# Patient Record
Sex: MALE | Race: WHITE | NOT HISPANIC OR LATINO | Employment: UNEMPLOYED | URBAN - METROPOLITAN AREA
[De-identification: names, ages, dates, MRNs, and addresses within clinical notes are randomized per-mention and may not be internally consistent; named-entity substitution may affect disease eponyms.]

---

## 2017-11-09 ENCOUNTER — HOSPITAL ENCOUNTER (EMERGENCY)
Facility: CLINIC | Age: 32
Discharge: HOME | End: 2017-11-10
Attending: EMERGENCY MEDICINE | Admitting: EMERGENCY MEDICINE
Payer: COMMERCIAL

## 2017-11-09 DIAGNOSIS — J11.1 INFLUENZA-LIKE ILLNESS: ICD-10-CM

## 2017-11-09 LAB
ALBUMIN SERPL-MCNC: 3.5 G/DL (ref 3.4–5)
ALP SERPL-CCNC: 70 U/L (ref 40–150)
ALT SERPL W P-5'-P-CCNC: 33 U/L (ref 0–70)
ANION GAP SERPL CALCULATED.3IONS-SCNC: 7 MMOL/L (ref 3–14)
AST SERPL W P-5'-P-CCNC: 17 U/L (ref 0–45)
BASOPHILS # BLD AUTO: 0.1 10E9/L (ref 0–0.2)
BASOPHILS NFR BLD AUTO: 0.4 %
BILIRUB SERPL-MCNC: 0.3 MG/DL (ref 0.2–1.3)
BUN SERPL-MCNC: 12 MG/DL (ref 7–30)
CALCIUM SERPL-MCNC: 8.3 MG/DL (ref 8.5–10.1)
CHLORIDE SERPL-SCNC: 105 MMOL/L (ref 94–109)
CK SERPL-CCNC: 81 U/L (ref 30–300)
CO2 SERPL-SCNC: 24 MMOL/L (ref 20–32)
CREAT SERPL-MCNC: 0.8 MG/DL (ref 0.66–1.25)
DEPRECATED S PYO AG THROAT QL EIA: NORMAL
DIFFERENTIAL METHOD BLD: ABNORMAL
EOSINOPHIL # BLD AUTO: 0.1 10E9/L (ref 0–0.7)
EOSINOPHIL NFR BLD AUTO: 0.4 %
ERYTHROCYTE [DISTWIDTH] IN BLOOD BY AUTOMATED COUNT: 12.9 % (ref 10–15)
FLUAV+FLUBV AG SPEC QL: NEGATIVE
FLUAV+FLUBV AG SPEC QL: NEGATIVE
GFR SERPL CREATININE-BSD FRML MDRD: >90 ML/MIN/1.7M2
GLUCOSE SERPL-MCNC: 103 MG/DL (ref 70–99)
HCT VFR BLD AUTO: 40.8 % (ref 40–53)
HGB BLD-MCNC: 13.9 G/DL (ref 13.3–17.7)
IMM GRANULOCYTES # BLD: 0.1 10E9/L (ref 0–0.4)
IMM GRANULOCYTES NFR BLD: 0.4 %
LACTATE BLD-SCNC: 1.1 MMOL/L (ref 0.7–2)
LYMPHOCYTES # BLD AUTO: 1.5 10E9/L (ref 0.8–5.3)
LYMPHOCYTES NFR BLD AUTO: 9 %
MCH RBC QN AUTO: 31.2 PG (ref 26.5–33)
MCHC RBC AUTO-ENTMCNC: 34.1 G/DL (ref 31.5–36.5)
MCV RBC AUTO: 92 FL (ref 78–100)
MONOCYTES # BLD AUTO: 1.3 10E9/L (ref 0–1.3)
MONOCYTES NFR BLD AUTO: 7.8 %
NEUTROPHILS # BLD AUTO: 13.9 10E9/L (ref 1.6–8.3)
NEUTROPHILS NFR BLD AUTO: 82 %
NRBC # BLD AUTO: 0 10*3/UL
NRBC BLD AUTO-RTO: 0 /100
PLATELET # BLD AUTO: 241 10E9/L (ref 150–450)
POTASSIUM SERPL-SCNC: 3.6 MMOL/L (ref 3.4–5.3)
PROT SERPL-MCNC: 7.4 G/DL (ref 6.8–8.8)
RBC # BLD AUTO: 4.46 10E12/L (ref 4.4–5.9)
SODIUM SERPL-SCNC: 136 MMOL/L (ref 133–144)
SPECIMEN SOURCE: NORMAL
SPECIMEN SOURCE: NORMAL
WBC # BLD AUTO: 16.9 10E9/L (ref 4–11)

## 2017-11-09 PROCEDURE — 87081 CULTURE SCREEN ONLY: CPT | Performed by: EMERGENCY MEDICINE

## 2017-11-09 PROCEDURE — 80053 COMPREHEN METABOLIC PANEL: CPT | Performed by: EMERGENCY MEDICINE

## 2017-11-09 PROCEDURE — 96374 THER/PROPH/DIAG INJ IV PUSH: CPT

## 2017-11-09 PROCEDURE — 25000128 H RX IP 250 OP 636: Performed by: EMERGENCY MEDICINE

## 2017-11-09 PROCEDURE — 83605 ASSAY OF LACTIC ACID: CPT | Performed by: EMERGENCY MEDICINE

## 2017-11-09 PROCEDURE — 85025 COMPLETE CBC W/AUTO DIFF WBC: CPT | Performed by: EMERGENCY MEDICINE

## 2017-11-09 PROCEDURE — 87804 INFLUENZA ASSAY W/OPTIC: CPT | Mod: 91 | Performed by: EMERGENCY MEDICINE

## 2017-11-09 PROCEDURE — 93005 ELECTROCARDIOGRAM TRACING: CPT

## 2017-11-09 PROCEDURE — 96361 HYDRATE IV INFUSION ADD-ON: CPT

## 2017-11-09 PROCEDURE — 99285 EMERGENCY DEPT VISIT HI MDM: CPT | Mod: 25

## 2017-11-09 PROCEDURE — 82550 ASSAY OF CK (CPK): CPT | Performed by: EMERGENCY MEDICINE

## 2017-11-09 PROCEDURE — 87880 STREP A ASSAY W/OPTIC: CPT | Performed by: EMERGENCY MEDICINE

## 2017-11-09 RX ORDER — LIDOCAINE 40 MG/G
CREAM TOPICAL
Status: DISCONTINUED | OUTPATIENT
Start: 2017-11-09 | End: 2017-11-10 | Stop reason: HOSPADM

## 2017-11-09 RX ORDER — SODIUM CHLORIDE 9 MG/ML
1000 INJECTION, SOLUTION INTRAVENOUS CONTINUOUS
Status: DISCONTINUED | OUTPATIENT
Start: 2017-11-09 | End: 2017-11-10 | Stop reason: HOSPADM

## 2017-11-09 RX ORDER — KETOROLAC TROMETHAMINE 30 MG/ML
30 INJECTION, SOLUTION INTRAMUSCULAR; INTRAVENOUS ONCE
Status: COMPLETED | OUTPATIENT
Start: 2017-11-09 | End: 2017-11-09

## 2017-11-09 RX ORDER — MORPHINE SULFATE 4 MG/ML
8 INJECTION, SOLUTION INTRAMUSCULAR; INTRAVENOUS ONCE
Status: COMPLETED | OUTPATIENT
Start: 2017-11-09 | End: 2017-11-10

## 2017-11-09 RX ADMIN — KETOROLAC TROMETHAMINE 30 MG: 30 INJECTION, SOLUTION INTRAMUSCULAR at 23:23

## 2017-11-09 RX ADMIN — SODIUM CHLORIDE 1000 ML: 9 INJECTION, SOLUTION INTRAVENOUS at 23:24

## 2017-11-09 ASSESSMENT — ENCOUNTER SYMPTOMS
CONFUSION: 0
VOMITING: 1
FEVER: 1
NECK PAIN: 1
CHILLS: 1
COUGH: 1
HALLUCINATIONS: 0
NECK STIFFNESS: 0
FATIGUE: 1
NAUSEA: 1
MYALGIAS: 1

## 2017-11-09 NOTE — ED AVS SNAPSHOT
[facility]    [address] [facility] [address]    Phone:  [phone]    Fax:  [phone]                                       Ted Borrero   MRN: [id number]    Department:  [facility]   Date of Visit:  11/9/2017           Patient Information     Date Of Birth          1985        Your diagnoses for this visit were:     Influenza-like illness        You were seen by Georgia Mckeon MD and Akil Swanson MD.      Follow-up Information     Follow up with Michoacano Gaytan MD.    Specialty:  Family Practice    Why:  3-5 days as needed    Contact information:    [facility]  [address] [facility] [address] [phone]          Follow up with [facility].    Specialty:  EMERGENCY MEDICINE    Why:  As needed, If symptoms worsen    Contact information:    [address] [facility] [address] [phone]      Discharge References/Attachments     VIRAL SYNDROME (ADULT) (ENGLISH)      24 Hour Appointment Hotline       To make an appointment at any [facility] clinic, call [phone] ([phone]). If you don't have a family doctor or clinic, we will help you find one. [facility] clinics are conveniently located to serve the needs of you and your family.             Review of your medicines      START taking        Dose / Directions Last dose taken    HYDROcodone-acetaminophen 5-325 MG per tablet   Commonly known as:  NORCO   Dose:  1-2 tablet   Quantity:  15 tablet        Take 1-2 tablets by mouth every 4 hours as needed for moderate to severe pain   Refills:  0          Our records show that you are taking the medicines listed below. If these are incorrect, please call your family doctor or clinic.        Dose / Directions Last dose taken    ADDERALL PO        Refills:  0                Prescriptions were sent or printed at these  locations (1 Prescription)                   Other Prescriptions                Printed at Department/Unit printer (1 of 1)         HYDROcodone-acetaminophen (NORCO) 5-325 MG per tablet                Procedures and tests performed during your visit     Beta strep group A culture    CBC with platelets differential    CK total    CSF Culture Aerobic Bacterial    Cell count with differential CSF: Tube 1    Cell count with differential CSF: Tube 4    Comprehensive metabolic panel    EKG 12-lead, tracing only    Glucose CSF: Tube 2    Gram stain    Influenza A/B antigen    Lactic acid whole blood    Protein total CSF: Tube 2    Rapid strep screen    UA with Microscopic      Orders Needing Specimen Collection     None      Pending Results     Date and Time Order Name Status Description    11/9/2017 2356 Gram stain In process     11/9/2017 2356 CSF Culture Aerobic Bacterial In process     11/9/2017 2251 EKG 12-lead, tracing only Preliminary     11/9/2017 2248 Beta strep group A culture In process             Pending Culture Results     Date and Time Order Name Status Description    11/9/2017 2356 Gram stain In process     11/9/2017 2356 CSF Culture Aerobic Bacterial In process     11/9/2017 2248 Beta strep group A culture In process             Pending Results Instructions     If you had any lab results that were not finalized at the time of your Discharge, you can call the ED Lab Result RN at [phone]. You will be contacted by this team for any positive Lab results or changes in treatment. The nurses are available 7 days a week from 10A to 6:30P.  You can leave a message 24 hours per day and they will return your call.        Test Results From Your Hospital Stay        11/9/2017 11:24 PM      Component Results     Component    Specimen Description    Throat    Rapid Strep A Screen    NEGATIVE: No Group A streptococcal antigen detected by immunoassay, await culture report.         11/9/2017 11:25 PM      Component  Results     Component Value Ref Range & Units Status    WBC 16.9 (H) 4.0 - 11.0 10e9/L Final    RBC Count 4.46 4.4 - 5.9 10e12/L Final    Hemoglobin 13.9 13.3 - 17.7 g/dL Final    Hematocrit 40.8 40.0 - 53.0 % Final    MCV 92 78 - 100 fl Final    MCH 31.2 26.5 - 33.0 pg Final    MCHC 34.1 31.5 - 36.5 g/dL Final    RDW 12.9 10.0 - 15.0 % Final    Platelet Count 241 150 - 450 10e9/L Final    Diff Method Automated Method  Final    % Neutrophils 82.0 % Final    % Lymphocytes 9.0 % Final    % Monocytes 7.8 % Final    % Eosinophils 0.4 % Final    % Basophils 0.4 % Final    % Immature Granulocytes 0.4 % Final    Nucleated RBCs 0 0 /100 Final    Absolute Neutrophil 13.9 (H) 1.6 - 8.3 10e9/L Final    Absolute Lymphocytes 1.5 0.8 - 5.3 10e9/L Final    Absolute Monocytes 1.3 0.0 - 1.3 10e9/L Final    Absolute Eosinophils 0.1 0.0 - 0.7 10e9/L Final    Absolute Basophils 0.1 0.0 - 0.2 10e9/L Final    Abs Immature Granulocytes 0.1 0 - 0.4 10e9/L Final    Absolute Nucleated RBC 0.0  Final         11/9/2017 11:44 PM      Component Results     Component Value Ref Range & Units Status    Sodium 136 133 - 144 mmol/L Final    Potassium 3.6 3.4 - 5.3 mmol/L Final    Chloride 105 94 - 109 mmol/L Final    Carbon Dioxide 24 20 - 32 mmol/L Final    Anion Gap 7 3 - 14 mmol/L Final    Glucose 103 (H) 70 - 99 mg/dL Final    Urea Nitrogen 12 7 - 30 mg/dL Final    Creatinine 0.80 0.66 - 1.25 mg/dL Final    GFR Estimate >90 >60 mL/min/1.7m2 Final    Non  GFR Calc    GFR Estimate If Black >90 >60 mL/min/1.7m2 Final    African American GFR Calc    Calcium 8.3 (L) 8.5 - 10.1 mg/dL Final    Bilirubin Total 0.3 0.2 - 1.3 mg/dL Final    Albumin 3.5 3.4 - 5.0 g/dL Final    Protein Total 7.4 6.8 - 8.8 g/dL Final    Alkaline Phosphatase 70 40 - 150 U/L Final    ALT 33 0 - 70 U/L Final    AST 17 0 - 45 U/L Final         11/9/2017 11:32 PM      Component Results     Component Value Ref Range & Units Status    Lactic Acid 1.1 0.7 - 2.0 mmol/L  Final         11/10/2017 12:34 AM      Component Results     Component Value Ref Range & Units Status    Color Urine Yellow  Final    Appearance Urine Clear  Final    Glucose Urine Negative NEG^Negative mg/dL Final    Bilirubin Urine Negative NEG^Negative Final    Ketones Urine Negative NEG^Negative mg/dL Final    Specific Gravity Urine 1.031 1.003 - 1.035 Final    Blood Urine Negative NEG^Negative Final    pH Urine 6.0 5.0 - 7.0 pH Final    Protein Albumin Urine Negative NEG^Negative mg/dL Final    Urobilinogen mg/dL 2.0 0.0 - 2.0 mg/dL Final    Nitrite Urine Negative NEG^Negative Final    Leukocyte Esterase Urine Negative NEG^Negative Final    Source Midstream Urine  Final    WBC Urine 3 (H) 0 - 2 /HPF Final    RBC Urine 1 0 - 2 /HPF Final    Bacteria Urine Few (A) NEG^Negative /HPF Final    Yeast Urine Few (A) NEG^Negative /HPF Final    Squamous Epithelial /HPF Urine 1 0 - 1 /HPF Final    Mucous Urine Present (A) NEG^Negative /LPF Final         11/9/2017 11:44 PM      Component Results     Component Value Ref Range & Units Status    CK Total 81 30 - 300 U/L Final         11/9/2017 11:43 PM      Component Results     Component Value Ref Range & Units Status    Influenza A/B Agn Specimen Nasopharyngeal  Final    Influenza A Negative NEG^Negative Final    Influenza B Negative NEG^Negative Final    Test results must be correlated with clinical data. If necessary, results   should be confirmed by a molecular assay or viral culture.           11/10/2017  1:14 AM         11/10/2017  1:12 AM      Component Results     Component Value Ref Range & Units Status    WBC CSF  0 - 5 /uL Final    Test not performed. Criteria not met for second cell count.    RBC CSF  0 - 2 /uL Final    Test not performed. Criteria not met for second cell count.         11/10/2017  1:04 AM      Component Results     Component Value Ref Range & Units Status    Glucose CSF 73 (H) 40 - 70 mg/dL Final    CSF glucose concentrations are about 60  percent of normal plasma glucose.         11/10/2017  1:04 AM      Component Results     Component Value Ref Range & Units Status    Protein Total CSF 33 15 - 60 mg/dL Final         11/10/2017 12:42 AM         11/10/2017 12:42 AM         11/10/2017  1:10 AM      Component Results     Component Value Ref Range & Units Status    WBC CSF 2 0 - 5 /uL Final    RBC CSF 0 0 - 2 /uL Final    Tube Number 4 # Final    Color CSF Colorless CLRL^Colorless Final    Appearance CSF Clear CLER^Clear Final                Clinical Quality Measure: Blood Pressure Screening     Your blood pressure was checked while you were in the emergency department today. The last reading we obtained was  BP: 119/80 . Please read the guidelines below about what these numbers mean and what you should do about them.  If your systolic blood pressure (the top number) is less than 120 and your diastolic blood pressure (the bottom number) is less than 80, then your blood pressure is normal. There is nothing more that you need to do about it.  If your systolic blood pressure (the top number) is 120-139 or your diastolic blood pressure (the bottom number) is 80-89, your blood pressure may be higher than it should be. You should have your blood pressure rechecked within a year by a primary care provider.  If your systolic blood pressure (the top number) is 140 or greater or your diastolic blood pressure (the bottom number) is 90 or greater, you may have high blood pressure. High blood pressure is treatable, but if left untreated over time it can put you at risk for heart attack, stroke, or kidney failure. You should have your blood pressure rechecked by a primary care provider within the next 4 weeks.  If your provider in the emergency department today gave you specific instructions to follow-up with your doctor or provider even sooner than that, you should follow that instruction and not wait for up to 4 weeks for your follow-up visit.        Thank you for  "choosing [facility]       Thank you for choosing [facility] for your care. Our goal is always to provide you with excellent care. Hearing back from our patients is one way we can continue to improve our services. Please take a few minutes to complete the written survey that you may receive in the mail after you visit with us. Thank you!        [email or web address] Information     [employer] lets you send messages to your doctor, view your test results, renew your prescriptions, schedule appointments and more. To sign up, go to [email or web address] . Click on \"Log in\" on the left side of the screen, which will take you to the Welcome page. Then click on \"Sign up Now\" on the right side of the page.     You will be asked to enter the access code listed below, as well as some personal information. Please follow the directions to create your username and password.     Your access code is: [email or web address]  Expires: 2018  1:05 AM     Your access code will  in 90 days. If you need help or a new code, please call your [facility] clinic or [phone].        Care EveryWhere ID     This is your Care EveryWhere ID. This could be used by other organizations to access your [facility] medical records  [id number]        Equal Access to Services     CHRIS FINNEY : Hadgunjan babino Soshamika, waaxda luqadaha, qaybta kaalmada adepreciousyada, massiel solano. So [facility] [phone].    ATENCIÓN: Si habla español, tiene a bertrand disposición servicios gratuitos de asistencia lingüística. Llame al [phone].    We comply with applicable federal civil rights laws and [place] laws. We do not discriminate on the basis of race, color, national origin, age, disability, sex, sexual orientation, or gender identity.            After Visit Summary       This is your record. Keep this with you and show to your community pharmacist(s) and doctor(s) at your next visit.                  "

## 2017-11-10 VITALS
RESPIRATION RATE: 20 BRPM | SYSTOLIC BLOOD PRESSURE: 119 MMHG | WEIGHT: 220 LBS | HEIGHT: 69 IN | BODY MASS INDEX: 32.58 KG/M2 | DIASTOLIC BLOOD PRESSURE: 80 MMHG | TEMPERATURE: 99.5 F | OXYGEN SATURATION: 99 %

## 2017-11-10 LAB
ALBUMIN UR-MCNC: NEGATIVE MG/DL
APPEARANCE CSF: CLEAR
APPEARANCE UR: CLEAR
BACTERIA #/AREA URNS HPF: ABNORMAL /HPF
BILIRUB UR QL STRIP: NEGATIVE
COLOR CSF: COLORLESS
COLOR UR AUTO: YELLOW
GLUCOSE CSF-MCNC: 73 MG/DL (ref 40–70)
GLUCOSE UR STRIP-MCNC: NEGATIVE MG/DL
GRAM STN SPEC: NORMAL
HGB UR QL STRIP: NEGATIVE
INTERPRETATION ECG - MUSE: NORMAL
KETONES UR STRIP-MCNC: NEGATIVE MG/DL
LEUKOCYTE ESTERASE UR QL STRIP: NEGATIVE
MUCOUS THREADS #/AREA URNS LPF: PRESENT /LPF
NITRATE UR QL: NEGATIVE
PH UR STRIP: 6 PH (ref 5–7)
PROT CSF-MCNC: 33 MG/DL (ref 15–60)
RBC # CSF MANUAL: 0 /UL (ref 0–2)
RBC # CSF MANUAL: NORMAL /UL (ref 0–2)
RBC #/AREA URNS AUTO: 1 /HPF (ref 0–2)
SOURCE: ABNORMAL
SP GR UR STRIP: 1.03 (ref 1–1.03)
SPECIMEN SOURCE: NORMAL
SQUAMOUS #/AREA URNS AUTO: 1 /HPF (ref 0–1)
TUBE # CSF: 4 #
UROBILINOGEN UR STRIP-MCNC: 2 MG/DL (ref 0–2)
WBC # CSF MANUAL: 2 /UL (ref 0–5)
WBC # CSF MANUAL: NORMAL /UL (ref 0–5)
WBC #/AREA URNS AUTO: 3 /HPF (ref 0–2)
YEAST #/AREA URNS HPF: ABNORMAL /HPF

## 2017-11-10 PROCEDURE — 96375 TX/PRO/DX INJ NEW DRUG ADDON: CPT

## 2017-11-10 PROCEDURE — 96361 HYDRATE IV INFUSION ADD-ON: CPT

## 2017-11-10 PROCEDURE — 62270 DX LMBR SPI PNXR: CPT

## 2017-11-10 PROCEDURE — 89050 BODY FLUID CELL COUNT: CPT | Performed by: EMERGENCY MEDICINE

## 2017-11-10 PROCEDURE — 25000128 H RX IP 250 OP 636: Performed by: EMERGENCY MEDICINE

## 2017-11-10 PROCEDURE — 87205 SMEAR GRAM STAIN: CPT | Performed by: EMERGENCY MEDICINE

## 2017-11-10 PROCEDURE — 81001 URINALYSIS AUTO W/SCOPE: CPT | Performed by: EMERGENCY MEDICINE

## 2017-11-10 PROCEDURE — 87070 CULTURE OTHR SPECIMN AEROBIC: CPT | Performed by: EMERGENCY MEDICINE

## 2017-11-10 PROCEDURE — 82945 GLUCOSE OTHER FLUID: CPT | Performed by: EMERGENCY MEDICINE

## 2017-11-10 PROCEDURE — 84157 ASSAY OF PROTEIN OTHER: CPT | Performed by: EMERGENCY MEDICINE

## 2017-11-10 PROCEDURE — 25000132 ZZH RX MED GY IP 250 OP 250 PS 637: Performed by: EMERGENCY MEDICINE

## 2017-11-10 RX ORDER — ACETAMINOPHEN 325 MG/1
325 TABLET ORAL ONCE
Status: COMPLETED | OUTPATIENT
Start: 2017-11-10 | End: 2017-11-10

## 2017-11-10 RX ORDER — HYDROCODONE BITARTRATE AND ACETAMINOPHEN 5; 325 MG/1; MG/1
1-2 TABLET ORAL EVERY 4 HOURS PRN
Qty: 15 TABLET | Refills: 0 | Status: ON HOLD | OUTPATIENT
Start: 2017-11-10 | End: 2019-07-28

## 2017-11-10 RX ORDER — HYDROCODONE BITARTRATE AND ACETAMINOPHEN 5; 325 MG/1; MG/1
2 TABLET ORAL
Status: DISCONTINUED | OUTPATIENT
Start: 2017-11-10 | End: 2017-11-10 | Stop reason: HOSPADM

## 2017-11-10 RX ORDER — MORPHINE SULFATE 4 MG/ML
4 INJECTION, SOLUTION INTRAMUSCULAR; INTRAVENOUS
Status: DISCONTINUED | OUTPATIENT
Start: 2017-11-10 | End: 2017-11-10 | Stop reason: HOSPADM

## 2017-11-10 RX ADMIN — MORPHINE SULFATE 8 MG: 4 INJECTION INTRAVENOUS at 00:06

## 2017-11-10 RX ADMIN — ACETAMINOPHEN 325 MG: 325 TABLET, FILM COATED ORAL at 00:32

## 2017-11-10 RX ADMIN — SODIUM CHLORIDE 1000 ML: 9 INJECTION, SOLUTION INTRAVENOUS at 00:41

## 2017-11-10 NOTE — ED NOTES
Received sign out from Dr. Mckeon. CSF WBC is negative Follow up with Akil Baltazar MD  11/10/17 1231

## 2017-11-10 NOTE — ED NOTES
Pt started feeling unwell this am with body aches, fever, vomiting, head and neck pain.  Pt did take tylenol cold and flu earlier.

## 2017-11-10 NOTE — ED PROVIDER NOTES
History     Chief Complaint:  Fever    HPI   Ted Borrero is a 32 year old male who presents to the emergency department today for evaluation of a fever. The patient reports waking up today feeling very unwell with severe body aches and a fever. The patient notes he has been febrile up to 104.8. The patient states his ribcage is very tender, especially with deep inspiration. The patient notes pain to the back of his neck, but denies any changes in his range of motion of his neck. Additionally, the patient reports intermittent nausea and one episode of profuse vomiting. He also endorses a cough. The patient states he took some tylenol within the last two hours prior to arrival. He notes with his symptoms today he has not had much food or liquid intake. The patient denies any rash or recent travel. He denies any joint swelling, and his wife denies any confusion or hallucinations. He does note his infant daughter was ill last week with a respiratory illness.    Allergies:  No Known Drug Allergies      Medications:    Amphetamine-Dextroamphetamine (ADDERALL PO)     Past Medical History:    ADD    Past Surgical History:    History reviewed. No pertinent surgical history.    Family History:    History reviewed. No pertinent family history.    Social History:  The patient was accompanied to the ED by his wife.  Smoking Status: Current every day smoker  Smokeless Tobacco: Never used   Alcohol Use: Yes    Marital Status:        Review of Systems   Constitutional: Positive for chills, fatigue and fever.   Respiratory: Positive for cough.    Gastrointestinal: Positive for nausea and vomiting.   Musculoskeletal: Positive for myalgias and neck pain. Negative for neck stiffness.   Psychiatric/Behavioral: Negative for confusion and hallucinations.   All other systems reviewed and are negative.    Physical Exam     Patient Vitals for the past 24 hrs:   BP Temp Temp src Resp SpO2 Height Weight   11/09/17 2338 (!) 180/94  "- - - 95 % - -   11/09/17 2330 - - - - 97 % - -   11/09/17 2327 - 101.6  F (38.7  C) Temporal 20 99 % - -   11/09/17 2315 - - - - 99 % - -   11/09/17 2300 - - - - 100 % - -   11/09/17 2245 - - - - 97 % - -   11/09/17 2237 (!) 162/103 99.7  F (37.6  C) Oral 20 98 % 1.753 m (5' 9\") 99.8 kg (220 lb)   11/09/17 2234 - - - - 98 % - -   11/09/17 2233 (!) 162/103 - - - - - -      Physical Exam  General: Uncomfortable appearing adult male sitting upright.   Eyes: PERRL, Conjunctive within normal limits. No scleral icterus.   ENT: Moist mucous membranes, oropharynx clear. Mild erythema and edema of posterior oropharynx. No exudate or lesions.   Neck: No lymphadenopathy or neck rigidity.  CV: Normal S1S2, no murmur, rub or gallop. Tachycardic rate.   Resp: Clear to auscultation bilaterally, no wheezes, rales or rhonchi. Normal respiratory effort.  GI: Abdomen is soft, nontender and nondistended. No palpable masses. No rebound or guarding.  MSK: No edema. Normal active range of motion. Mild diffuse tenderness to palpation of the extremities.No palpable joint effusions or extremity edema.   Skin: Warm and dry. No rashes or lesions or ecchymoses on visible skin.  Neuro: Alert and oriented. Responds appropriately to all questions and commands. No focal findings appreciated. Normal muscle tone.  Psych: Appropriate mood and affect.   Emergency Department Course     ECG:  Indication: Fever  Completed at 2314.  Read at 2316.   Sinus tachycardia. Otherwise normal ECG.   Rate 110 bpm. NM interval 138. QRS duration 94. QT/QTc 308/416. P-R-T axes 49 -7 24.     Laboratory:  Laboratory findings were communicated with the patient who voiced understanding of the findings.    Rapid strep screen: Negative   Influenza A/B antigen: Negative     Beta strep group A culture: Pending    CBC: WBC 16.9 (H), HGB 13.9,   CMP: Glucose 103 (H), Calcium 8.3 (L),  o/w WNL. (Creatinine 0.80)   Lactic Acid: 1.1   CK total: 81    Lumbar Puncture:  Cell " count with differential CSF: Pending  Glucose CSF: Pending  Protein total CSF: Pending   CSF Culture Aerobic bacterial: Pending   Gram stain: Pending     Procedures:   Lumbar Puncture          INDICATION:  headache and fever      CONSENT:  Risks (including but not limited to; infection, bleeding, spinal headache with possibility of spinal patch and temporary or permanent neurologic injury), benefits and alternatives were discussed with patient and consent for procedure was obtained.    TIMEOUT:  Universal protocol was followed. TIME OUT conducted just prior to starting procedure confirmed patient identity, site/side, procedure, patient position, and availability of correct equipment and implants? Yes      MEDICATIONS:  Lidocaine: Local infiltration    PROCEDURAL NOTE:  Patient was placed in a left lateral decubitus position.  The low back was prepped with Betadine.  The patient was medicated as above.  A spinal needle was used to gain access to the subarachnoid space with stylet in place.  The fluid was clear.  Stylet was replaced and needle withdrawn.    PATIENT STATUS:  Patient tolerated the procedure well.  There were no complications.     Interventions:  2323 NS Bolus 1,000mL IV   2324 Toradol 30mg IV   0006 Morphine 8mg IV       Emergency Department Course:  Nursing notes and vitals reviewed.  2245 I performed an exam of the patient as documented above.   The patient's throat was swabbed and this sample was sent for rapid strep screen, findings above.   IV was inserted and blood was drawn for laboratory testing, results above.   EKG obtained in the ED, see results above.    2355 I rechecked the patient and discussed the possibility of a lumbar puncture.   0005 I performed the lumbar puncture as documented above.   I discussed the treatment plan with the patient. They expressed understanding of this plan and consented to discharge. They will be discharged home with instructions for care and follow up. In  addition, the patient will return to the emergency department if their symptoms persist, worsen, if new symptoms arise or if there is any concern.  All questions were answered. I personally reviewed the laboratory results with the Patient and spouse and answered all related questions prior to discharge.  Impression & Plan      Medical Decision Making:  Ted Borrero is a 32 year old male healthy at baseline who presents to the emergency department with concern for fever, headache, mild respiratory symptoms, and nausea. His symptoms seem most consistent with influenza like illness clinically, however I was unable to entirely exclude meningitis. He does have a leukocytosis. Influenza here was negative although my suspicion is still high for influenza. He had a negative rapid strep test. He had no evidence of encephalopathy. Lumbar puncture was performed and CSF results pending at this time. Patient is signed out to Akil Swanson MD, for follow up of CSF studies. Appropriate disposition pending results. I discussed this plan with the patient. He verbalized understanding and agreement. All questions answered prior to transfer of care.     Diagnosis:    ICD-10-CM    1. Influenza-like illness R69        Disposition:  The patient's care is transferred to Akil Swanson MD, of the emergency department service awaiting CSF test results.     Scribe Disclosure:  I, Erick Roger, am serving as a scribe at 10:41 PM on 11/9/2017 to document services personally performed by Georgia Mckeon MD based on my observations and the provider's statements to me.    11/9/2017   [facility] EMERGENCY DEPARTMENT       Georgia Mckeon MD  11/10/17 0040

## 2017-11-12 LAB
BACTERIA SPEC CULT: NORMAL
SPECIMEN SOURCE: NORMAL

## 2017-11-15 LAB
BACTERIA SPEC CULT: NO GROWTH
Lab: NORMAL
SPECIMEN SOURCE: NORMAL

## 2018-05-20 ENCOUNTER — OFFICE VISIT (OUTPATIENT)
Dept: URGENT CARE | Facility: URGENT CARE | Age: 33
End: 2018-05-20
Payer: MEDICAID

## 2018-05-20 ENCOUNTER — RADIANT APPOINTMENT (OUTPATIENT)
Dept: GENERAL RADIOLOGY | Facility: CLINIC | Age: 33
End: 2018-05-20
Attending: FAMILY MEDICINE
Payer: MEDICAID

## 2018-05-20 VITALS
SYSTOLIC BLOOD PRESSURE: 126 MMHG | BODY MASS INDEX: 34.64 KG/M2 | DIASTOLIC BLOOD PRESSURE: 69 MMHG | TEMPERATURE: 97.4 F | OXYGEN SATURATION: 100 % | HEART RATE: 86 BPM | WEIGHT: 234.6 LBS | RESPIRATION RATE: 16 BRPM

## 2018-05-20 DIAGNOSIS — M77.8 RIGHT WRIST TENDINITIS: Primary | ICD-10-CM

## 2018-05-20 DIAGNOSIS — M77.8 RIGHT WRIST TENDINITIS: ICD-10-CM

## 2018-05-20 PROCEDURE — 73110 X-RAY EXAM OF WRIST: CPT | Mod: RT

## 2018-05-20 PROCEDURE — 99203 OFFICE O/P NEW LOW 30 MIN: CPT | Performed by: FAMILY MEDICINE

## 2018-05-20 RX ORDER — METHYLPREDNISOLONE 4 MG
4 TABLET, DOSE PACK ORAL SEE ADMIN INSTRUCTIONS
Qty: 21 TABLET | Refills: 0 | Status: ON HOLD | OUTPATIENT
Start: 2018-05-20 | End: 2019-07-28

## 2018-05-20 NOTE — LETTER
[facility]  [address]  [facility] [address]  [phone]      May 20, 2018    RE:  Ted Borrero                                                                                                                                                       [address] [facility] [address]            To whom it may concern:    Ted Borrero is under my professional care for Right wrist tendinitis.   He  may return to work with the following: wear right wrist splint at work,  Limited use or right hand for 3 weeks .          Sincerely,        Jennifer Handley MD    [facility]

## 2018-05-20 NOTE — PROGRESS NOTES
"SUBJECTIVE  Chief Complaint   Patient presents with     Musculoskeletal Problem     Righthand injury x 4-5 days ago. Pt has swelling, redness, and limited mobility.      CC:Ted Borrero is a 33 year old male here for evaluation of right wrist pain with grasping and moving the wrist    HPI: He has had wrist pain for 5 days.  The  pain started due to what activity- - pushing / twisting a tool-  He heard a \"pop\" and developed dorsal wrist pain.  The location of the pain is - dorsal-(to the back).  The pain is - moderate.  Pain does   limit usual activities at work and home  Associated symptoms - tenderness, swelling and achiness.  The pain is worsened by - extension/ ,movement, .  The pain is  worse with forceful movement,  Especially when doing forceful twisting  He has treated the pain with rest, ibuprophen and tylenol.    PAST HISTORY  Previous Wrist imaging - NO.  Previous wrist injury/trauma - NO.  Previous wrist surgery - NO.    Past Medical History:   Diagnosis Date     ADD (attention deficit disorder)      There is no problem list on file for this patient.      ALLERGIES:  Review of patient's allergies indicates no known allergies.      Current Outpatient Prescriptions on File Prior to Visit:  Amphetamine-Dextroamphetamine (ADDERALL PO)    HYDROcodone-acetaminophen (NORCO) 5-325 MG per tablet Take 1-2 tablets by mouth every 4 hours as needed for moderate to severe pain (Patient not taking: Reported on 5/20/2018)     No current facility-administered medications on file prior to visit.     Social History   Substance Use Topics     Smoking status: Current Every Day Smoker     Smokeless tobacco: Never Used     Alcohol use Yes       No family history on file.    ROS:  CONSTITUTIONAL:NEGATIVE for fever, chills,    INTEGUMENTARY/SKIN: NEGATIVE for worrisome rashes,   EYES: NEGATIVE for vision changes or irritation  ENT/MOUTH: NEGATIVE for ear, mouth and throat problems  RESP:NEGATIVE for significant cough or " SOB    OBJECTIVE:  /69  Pulse 86  Temp 97.4  F (36.3  C) (Tympanic)  Resp 16  Wt 234 lb 9.6 oz (106.4 kg)  SpO2 100%  BMI 34.64 kg/m2  General: alert, mild distress and cooperative.  Neuro: oriented to person, place and time.   Walks, sits, gestures without difficulty  SKIN: Skin color, texture, turgor normal. No rashes or lesions.     Musculoskeletal  Pain free FROM of the right shoulder, elbow, fingers with sensation intact              .  Wrist Exam right.  Inspection/palpation: mild dorsal swelling     Range of Motion  Normal motion  Mildly limited by pain with PROM  Wrist ligaments stable  little pain with flexion of the wrist,  not worse with resisted flexion    pain with extension of the wrist,    worse with resisted extension    EYES: EOMI,  PERRL, conjunctiva clear           X-ray - no fracture right wrist    ASSESSMENT/PLAN  Right wrist tendinitis     Dorsal  Extensor tendons    - XR Wrist Right G/E 3 Views; Future  - order for DME; Right hand thumb spica splint  - methylPREDNISolone (MEDROL) 4 MG tablet; Take 1 tablet (4 mg) by mouth See Admin Instructions follow package directions      Pain control with OTC acetaminophen/ ibuprofen     Steroid medication to reduce inflammation of the injured tendons  Try to limit activities that cause pain  Warm packs to the painful region to encourage blood flow  Splint provided  We discussed that tendonitis can be slow to heal (weeks)  and that repeated forceful movements of the injured tendons can make symptoms worse and healing more prolonged     Note for work

## 2018-05-20 NOTE — PATIENT INSTRUCTIONS
Tendonitis  A tendon is the thick fibrous cord that joins muscle to bone and allows joints to move. When a tendon becomes inflamed, it is called tendonitis. This can occur from overuse, injury, or infection. This usually involves the shoulders, forearm, wrist, hands and foot. Symptoms include pain, swelling and tenderness to the touch. Moving the joint increases the pain.  It takes 4 to 6 weeks for tendonitis to heal. It is treated by preventing motion of the tendon with a splint or brace and the use of anti-inflammatory medicine.  Home care    Some people find relief with ice packs. These can be crushed or cubed ice in a plastic bag or a bag of frozen vegetables wrapped in a thin towel. Other people get better relief with heat. This can include a hot shower, hot bath, or a moist towel warmed in a microwave. Try each and use the method that feels best, for 15 to 20 minutes several times a day.    Rest the inflamed joint and protect it from movement.    You may use over-the-counter ibuprofen or naproxen to treat pain and inflammation, unless another medicine was prescribed. If you can't take these medicines, acetaminophen may help with the pain, but does not treat inflammation. If you have chronic liver or kidney disease or ever had a stomach ulcer or gastrointestinal bleeding, talk with your doctor before using these medicines.    As your symptoms improve, begin gradual motion at the involved joint.  Follow-up care  Follow up with your healthcare provider if you are not improving after 5 days of treatment.  When to seek medical advice  Call your healthcare provider right away if any of these occur:    Redness over the painful area    Increasing pain or swelling at the joint    Fever (1 degree above your normal temperature) lasting 24 to 48 hours Or, whatever your healthcare provider told you to report based on your condition  Date Last Reviewed: 11/21/2015 2000-2017 [employer]. [address], [place] [address]. All rights reserved. This information is not intended as a substitute for professional medical care. Always follow your healthcare professional's instructions.

## 2018-05-20 NOTE — MR AVS SNAPSHOT
After Visit Summary   5/20/2018    Ted Borrero    MRN: [id number]           Patient Information     Date Of Birth          1985        Visit Information        Provider Department      5/20/2018 12:50 PM Jennifer Handley MD [facility]        Today's Diagnoses     Right wrist tendinitis    -  1      Care Instructions      Tendonitis  A tendon is the thick fibrous cord that joins muscle to bone and allows joints to move. When a tendon becomes inflamed, it is called tendonitis. This can occur from overuse, injury, or infection. This usually involves the shoulders, forearm, wrist, hands and foot. Symptoms include pain, swelling and tenderness to the touch. Moving the joint increases the pain.  It takes 4 to 6 weeks for tendonitis to heal. It is treated by preventing motion of the tendon with a splint or brace and the use of anti-inflammatory medicine.  Home care    Some people find relief with ice packs. These can be crushed or cubed ice in a plastic bag or a bag of frozen vegetables wrapped in a thin towel. Other people get better relief with heat. This can include a hot shower, hot bath, or a moist towel warmed in a microwave. Try each and use the method that feels best, for 15 to 20 minutes several times a day.    Rest the inflamed joint and protect it from movement.    You may use over-the-counter ibuprofen or naproxen to treat pain and inflammation, unless another medicine was prescribed. If you can't take these medicines, acetaminophen may help with the pain, but does not treat inflammation. If you have chronic liver or kidney disease or ever had a stomach ulcer or gastrointestinal bleeding, talk with your doctor before using these medicines.    As your symptoms improve, begin gradual motion at the involved joint.  Follow-up care  Follow up with your healthcare provider if you are not improving after 5 days of treatment.  When to seek medical advice  Call your  "healthcare provider right away if any of these occur:    Redness over the painful area    Increasing pain or swelling at the joint    Fever (1 degree above your normal temperature) lasting 24 to 48 hours Or, whatever your healthcare provider told you to report based on your condition  Date Last Reviewed: 11/21/2015 2000-2017 [employer]. [address] [place] [address]. All rights reserved. This information is not intended as a substitute for professional medical care. Always follow your healthcare professional's instructions.                Follow-ups after your visit        Who to contact     If you have questions or need follow up information about today's clinic visit or your schedule please contact [facility] directly at [phone].  Normal or non-critical lab and imaging results will be communicated to you by [employer], letter or phone within 4 business days after the clinic has received the results. If you do not hear from us within 7 days, please contact the clinic through [employer] or phone. If you have a critical or abnormal lab result, we will notify you by phone as soon as possible.  Submit refill requests through [employer] or call your pharmacy and they will forward the refill request to us. Please allow 3 business days for your refill to be completed.          Additional Information About Your Visit        [employer] Information     [employer] lets you send messages to your doctor, view your test results, renew your prescriptions, schedule appointments and more. To sign up, go to [email or web address] . Click on \"Log in\" on the left side of the screen, which will take you to the Welcome page. Then click on \"Sign up Now\" on the right side of the page.     You will be asked to enter the access code listed below, as well as some personal information. Please follow the directions to create your username and password.     Your access code is: [email or web address]  Expires: " 2018  2:17 PM     Your access code will  in 90 days. If you need help or a new code, please call your [facility] or [phone].        Care EveryWhere ID     This is your Care EveryWhere ID. This could be used by other organizations to access your [facility] medical records  [id number]        Your Vitals Were     Pulse Temperature Respirations Pulse Oximetry BMI (Body Mass Index)       86 97.4  F (36.3  C) (Tympanic) 16 100% 34.64 kg/m2        Blood Pressure from Last 3 Encounters:   18 126/69   11/10/17 119/80   12 154/69    Weight from Last 3 Encounters:   18 234 lb 9.6 oz (106.4 kg)   17 220 lb (99.8 kg)                 Today's Medication Changes          These changes are accurate as of 18  2:17 PM.  If you have any questions, ask your nurse or doctor.               Start taking these medicines.        Dose/Directions    methylPREDNISolone 4 MG tablet   Commonly known as:  MEDROL   Used for:  Right wrist tendinitis   Started by:  Jennifer Handley MD        Dose:  4 mg   Take 1 tablet (4 mg) by mouth See Admin Instructions follow package directions   Quantity:  21 tablet   Refills:  0       order for DME   Used for:  Right wrist tendinitis   Started by:  Jennifer Handley MD        Right hand thumb spica splint   Quantity:  1 Units   Refills:  0            Where to get your medicines      These medications were sent to [facility] [address] [place] [address] [id number] [place] [address] AT SEC of  [address] [place] [id number]     Phone:  [phone]     methylPREDNISolone 4 MG tablet         Some of these will need a paper prescription and others can be bought over the counter.  Ask your nurse if you have questions.     Bring a paper prescription for each of these medications     order for DME                Primary Care Provider Office Phone # Fax #    [place] aGytan [phone]       [facility]  [address] [facility] [address]        Equal Access to Services     LOUELIANA SHARMIN : Hadii aad ku hadtheresagwen Johanashamika, warobertoda lazaradivyaha, qagutierrezantoni bullockvickiirineo lentzstephanieirineo, massiel mendezrosaángel solano. So [facility] [phone].    ATENCIÓN: Si habla español, tiene a bertrand disposición servicios gratuitos de asistencia lingüística. [facility] [phone].    We comply with applicable federal civil rights laws and [place] laws. We do not discriminate on the basis of race, color, national origin, age, disability, sex, sexual orientation, or gender identity.            Thank you!     Thank you for choosing [facility]  for your care. Our goal is always to provide you with excellent care. Hearing back from our patients is one way we can continue to improve our services. Please take a few minutes to complete the written survey that you may receive in the mail after your visit with us. Thank you!             Your Updated Medication List - Protect others around you: Learn how to safely use, store and throw away your medicines at www.disposemymeds.org.          This list is accurate as of 5/20/18  2:17 PM.  Always use your most recent med list.                   Brand Name Dispense Instructions for use Diagnosis    ADDERALL PO           HYDROcodone-acetaminophen 5-325 MG per tablet    NORCO    15 tablet    Take 1-2 tablets by mouth every 4 hours as needed for moderate to severe pain        methylPREDNISolone 4 MG tablet    MEDROL    21 tablet    Take 1 tablet (4 mg) by mouth See Admin Instructions follow package directions    Right wrist tendinitis       order for DME     1 Units    Right hand thumb spica splint    Right wrist tendinitis

## 2018-09-23 ENCOUNTER — APPOINTMENT (OUTPATIENT)
Dept: GENERAL RADIOLOGY | Facility: CLINIC | Age: 33
End: 2018-09-23
Attending: EMERGENCY MEDICINE
Payer: MEDICAID

## 2018-09-23 ENCOUNTER — HOSPITAL ENCOUNTER (EMERGENCY)
Facility: CLINIC | Age: 33
Discharge: HOME | End: 2018-09-23
Attending: EMERGENCY MEDICINE | Admitting: EMERGENCY MEDICINE
Payer: MEDICAID

## 2018-09-23 VITALS
DIASTOLIC BLOOD PRESSURE: 77 MMHG | RESPIRATION RATE: 16 BRPM | TEMPERATURE: 98.3 F | OXYGEN SATURATION: 99 % | HEART RATE: 107 BPM | SYSTOLIC BLOOD PRESSURE: 127 MMHG

## 2018-09-23 DIAGNOSIS — L03.113 CELLULITIS OF RIGHT UPPER EXTREMITY: ICD-10-CM

## 2018-09-23 LAB
ANION GAP SERPL CALCULATED.3IONS-SCNC: 3 MMOL/L (ref 3–14)
BASOPHILS # BLD AUTO: 0.1 10E9/L (ref 0–0.2)
BASOPHILS NFR BLD AUTO: 0.8 %
BUN SERPL-MCNC: 15 MG/DL (ref 7–30)
CALCIUM SERPL-MCNC: 8.9 MG/DL (ref 8.5–10.1)
CHLORIDE SERPL-SCNC: 103 MMOL/L (ref 94–109)
CO2 SERPL-SCNC: 30 MMOL/L (ref 20–32)
CREAT SERPL-MCNC: 0.98 MG/DL (ref 0.66–1.25)
DIFFERENTIAL METHOD BLD: ABNORMAL
EOSINOPHIL # BLD AUTO: 0.2 10E9/L (ref 0–0.7)
EOSINOPHIL NFR BLD AUTO: 1.2 %
ERYTHROCYTE [DISTWIDTH] IN BLOOD BY AUTOMATED COUNT: 12.2 % (ref 10–15)
GFR SERPL CREATININE-BSD FRML MDRD: 89 ML/MIN/1.7M2
GLUCOSE SERPL-MCNC: 91 MG/DL (ref 70–99)
HCT VFR BLD AUTO: 41.7 % (ref 40–53)
HGB BLD-MCNC: 13.8 G/DL (ref 13.3–17.7)
IMM GRANULOCYTES # BLD: 0.1 10E9/L (ref 0–0.4)
IMM GRANULOCYTES NFR BLD: 0.4 %
LACTATE BLD-SCNC: 1.2 MMOL/L (ref 0.7–2)
LYMPHOCYTES # BLD AUTO: 3.2 10E9/L (ref 0.8–5.3)
LYMPHOCYTES NFR BLD AUTO: 23.2 %
MCH RBC QN AUTO: 31.6 PG (ref 26.5–33)
MCHC RBC AUTO-ENTMCNC: 33.1 G/DL (ref 31.5–36.5)
MCV RBC AUTO: 95 FL (ref 78–100)
MONOCYTES # BLD AUTO: 1.2 10E9/L (ref 0–1.3)
MONOCYTES NFR BLD AUTO: 8.4 %
NEUTROPHILS # BLD AUTO: 9.2 10E9/L (ref 1.6–8.3)
NEUTROPHILS NFR BLD AUTO: 66 %
NRBC # BLD AUTO: 0 10*3/UL
NRBC BLD AUTO-RTO: 0 /100
PLATELET # BLD AUTO: 313 10E9/L (ref 150–450)
POTASSIUM SERPL-SCNC: 4.3 MMOL/L (ref 3.4–5.3)
RBC # BLD AUTO: 4.37 10E12/L (ref 4.4–5.9)
SODIUM SERPL-SCNC: 136 MMOL/L (ref 133–144)
WBC # BLD AUTO: 13.9 10E9/L (ref 4–11)

## 2018-09-23 PROCEDURE — 25000128 H RX IP 250 OP 636: Performed by: EMERGENCY MEDICINE

## 2018-09-23 PROCEDURE — 73090 X-RAY EXAM OF FOREARM: CPT | Mod: RT

## 2018-09-23 PROCEDURE — 85025 COMPLETE CBC W/AUTO DIFF WBC: CPT | Performed by: EMERGENCY MEDICINE

## 2018-09-23 PROCEDURE — 87205 SMEAR GRAM STAIN: CPT | Performed by: EMERGENCY MEDICINE

## 2018-09-23 PROCEDURE — 96374 THER/PROPH/DIAG INJ IV PUSH: CPT

## 2018-09-23 PROCEDURE — 87040 BLOOD CULTURE FOR BACTERIA: CPT | Performed by: EMERGENCY MEDICINE

## 2018-09-23 PROCEDURE — 96361 HYDRATE IV INFUSION ADD-ON: CPT

## 2018-09-23 PROCEDURE — 87077 CULTURE AEROBIC IDENTIFY: CPT | Performed by: EMERGENCY MEDICINE

## 2018-09-23 PROCEDURE — 80048 BASIC METABOLIC PNL TOTAL CA: CPT | Performed by: EMERGENCY MEDICINE

## 2018-09-23 PROCEDURE — 36415 COLL VENOUS BLD VENIPUNCTURE: CPT | Performed by: EMERGENCY MEDICINE

## 2018-09-23 PROCEDURE — 87070 CULTURE OTHR SPECIMN AEROBIC: CPT | Performed by: EMERGENCY MEDICINE

## 2018-09-23 PROCEDURE — 99284 EMERGENCY DEPT VISIT MOD MDM: CPT | Mod: 25

## 2018-09-23 PROCEDURE — 83605 ASSAY OF LACTIC ACID: CPT | Performed by: EMERGENCY MEDICINE

## 2018-09-23 PROCEDURE — 87186 SC STD MICRODIL/AGAR DIL: CPT | Performed by: EMERGENCY MEDICINE

## 2018-09-23 RX ORDER — CEFAZOLIN SODIUM 2 G/100ML
2 INJECTION, SOLUTION INTRAVENOUS ONCE
Status: COMPLETED | OUTPATIENT
Start: 2018-09-23 | End: 2018-09-23

## 2018-09-23 RX ORDER — IBUPROFEN 200 MG
600 TABLET ORAL EVERY 8 HOURS PRN
Qty: 30 TABLET | Refills: 0 | Status: ON HOLD | OUTPATIENT
Start: 2018-09-23 | End: 2019-07-28

## 2018-09-23 RX ORDER — CEPHALEXIN 500 MG/1
500 CAPSULE ORAL 4 TIMES DAILY
Qty: 40 CAPSULE | Refills: 0 | Status: SHIPPED | OUTPATIENT
Start: 2018-09-23 | End: 2018-10-03

## 2018-09-23 RX ORDER — SODIUM CHLORIDE 9 MG/ML
1000 INJECTION, SOLUTION INTRAVENOUS CONTINUOUS
Status: DISCONTINUED | OUTPATIENT
Start: 2018-09-23 | End: 2018-09-24 | Stop reason: HOSPADM

## 2018-09-23 RX ADMIN — CEFAZOLIN SODIUM 2 G: 2 INJECTION, SOLUTION INTRAVENOUS at 21:16

## 2018-09-23 RX ADMIN — SODIUM CHLORIDE 1000 ML: 9 INJECTION, SOLUTION INTRAVENOUS at 21:16

## 2018-09-23 NOTE — ED AVS SNAPSHOT
[facility]    [address]    [facility] [address]    Phone:  [phone]    Fax:  [phone]                                       Ted Borrero   MRN: [id number]    Department:  [facility] Emergency Department   Date of Visit:  9/23/2018           Patient Information     Date Of Birth          1985        Your diagnoses for this visit were:     Cellulitis of right upper extremity        You were seen by Michoacano Harper MD.      Follow-up Information     Follow up with Michoacano Gaytan MD In 1 day.    Specialty:  Family Practice    Contact information:    [facility]  [address] [facility] [address]  [phone]          Discharge Instructions         Discharge Instructions  Cellulitis    Cellulitis is an infection of the skin that occurs when bacteria enter the skin.   Symptoms are generally redness, swelling, warmth and pain.  Your infection appeared to be appropriate to treat at home with antibiotics.  However, sometimes your infection may be worse than it seemed at first, or may worsen with time. If you have new or worse symptoms, you may need to be seen again in the Emergency Department or by your primary doctor.    Return to the Emergency Department if:    The redness, pain, or swelling gets a lot worse.  If the red area was marked, return if it is red beyond the marked area.    You are unable to get your antibiotics, or are vomiting them up or you can t take them.    You are feeling more ill, weak or lightheaded.    You start to run a new fever (temperature >101).    Anything else about the infection worries or concerns you.  Treatment:    Start your antibiotics right away, and take them as prescribed. Be sure to finish the whole prescription, even if you are better.    Apply a heating pad, warm packs, or warm water soaks to the infected area for 15 minutes at a time, at least 3 times a day. Do not use a  "heating pad on your feet or legs if you have diabetes. Do not sleep with a heating pad on, since this can cause burns or skin injury.    Rest your injured area for at least 1-2 days. After that you may start using your extremity again as long as there is not too much pain.     Raise the injured area above the level of your heart as much as possible in the first 1-2 days.    Tylenol  (acetaminophen), Motrin  (ibuprofen), or Advil  (ibuprofen) may help may help reduce pain and fever and may help you feel more comfortable. Be sure to read and follow the package directions, and ask your doctor if you have questions.    Follow-up with your doctor:    Re-check in clinic within 2-3 days.  Probiotics: If you have been given an antibiotic, you may want to also take a probiotic pill or eat yogurt with live cultures. Probiotics have \"good bacteria\" to help your intestines stay healthy. Studies have shown that probiotics help prevent diarrhea and other intestine problems (including C. diff infection) when you take antibiotics. You can buy these without a prescription in the pharmacy section of the store.     If you were given a prescription for medicine here today, be sure to read all of the information (including the package insert) that comes with your prescription.  This will include important information about the medicine, its side effects, and any warnings that you need to know about.  The pharmacist who fills the prescription can provide more information and answer questions you may have about the medicine.  If you have questions or concerns that the pharmacist cannot address, please call or return to the Emergency Department.     Opioid Medication Information    Pain medications are among the most commonly prescribed medicines, so we are including this information for all our patients. If you did not receive pain medication or get a prescription for pain medicine, you can ignore it.     You may have been given a " prescription for an opioid (narcotic) pain medicine and/or have received a pain medicine while here in the Emergency Department. These medicines can make you drowsy or impaired. You must not drive, operate dangerous equipment, or engage in any other dangerous activities while taking these medications. If you drive while taking these medications, you could be arrested for DUI, or driving under the influence. Do not drink any alcohol while you are taking these medications.     Opioid pain medications can cause addiction. If you have a history of chemical dependency of any type, you are at a higher risk of becoming addicted to pain medications.  Only take these prescribed medications to treat your pain when all other options have been tried. Take it for as short a time and as few doses as possible. Store your pain pills in a secure place, as they are frequently stolen and provide a dangerous opportunity for children or visitors in your house to start abusing these powerful medications. We will not replace any lost or stolen medicine.  As soon as your pain is better, you should flush all your remaining medication.     Many prescription pain medications contain Tylenol  (acetaminophen), including Vicodin , Tylenol #3 , Norco , Lortab , and Percocet .  You should not take any extra pills of Tylenol  if you are using these prescription medications or you can get very sick.  Do not ever take more than 3000 mg of acetaminophen in any 24 hour period.    All opioids tend to cause constipation. Drink plenty of water and eat foods that have a lot of fiber, such as fruits, vegetables, prune juice, apple juice and high fiber cereal.  Take a laxative if you don t move your bowels at least every other day. Miralax , Milk of Magnesia, Colace , or Senna  can be used to keep you regular.      Remember that you can always come back to the Emergency Department if you are not able to see your regular doctor in the amount of time listed  above, if you get any new symptoms, or if there is anything that worries you.        24 Hour Appointment Hotline       To make an appointment at any [facility], call [phone] ([phone]). If you don't have a family doctor or clinic, we will help you find one. [facility] clinics are conveniently located to serve the needs of you and your family.             Review of your medicines      START taking        Dose / Directions Last dose taken    cephALEXin 500 MG capsule   Commonly known as:  KEFLEX   Dose:  500 mg   Quantity:  40 capsule        Take 1 capsule (500 mg) by mouth 4 times daily for 10 days   Refills:  0        ibuprofen 200 MG tablet   Commonly known as:  ADVIL/MOTRIN   Dose:  600 mg   Quantity:  30 tablet        Take 3 tablets (600 mg) by mouth every 8 hours as needed for mild pain   Refills:  0          Our records show that you are taking the medicines listed below. If these are incorrect, please call your family doctor or clinic.        Dose / Directions Last dose taken    ADDERALL PO        Refills:  0        HYDROcodone-acetaminophen 5-325 MG per tablet   Commonly known as:  NORCO   Dose:  1-2 tablet   Quantity:  15 tablet        Take 1-2 tablets by mouth every 4 hours as needed for moderate to severe pain   Refills:  0        methylPREDNISolone 4 MG tablet   Commonly known as:  MEDROL   Dose:  4 mg   Quantity:  21 tablet        Take 1 tablet (4 mg) by mouth See Admin Instructions follow package directions   Refills:  0        order for DME   Quantity:  1 Units        Right hand thumb spica splint   Refills:  0                Prescriptions were sent or printed at these locations (2 Prescriptions)                   Other Prescriptions                Printed at Department/Unit printer (2 of 2)         cephALEXin (KEFLEX) 500 MG capsule               ibuprofen (ADVIL/MOTRIN) 200 MG tablet                Procedures and tests performed during your visit     Procedure/Test Number of Times  Performed    Basic metabolic panel 1    Blood culture 2    CBC with platelets differential 1    Gram stain 1    Lactic acid whole blood 1    POC US SOFT TISSUE 1    Pulse oximetry nursing 1    Radius/Ulna XR, PA & LAT, right 1    Wound Culture Aerobic Bacterial 1      Orders Needing Specimen Collection     None      Pending Results     Date and Time Order Name Status Description    9/23/2018 2154 POC US SOFT TISSUE In process     9/23/2018 2111 Gram stain In process     9/23/2018 2111 Wound Culture Aerobic Bacterial In process     9/23/2018 2033 Blood culture In process     9/23/2018 2027 Blood culture In process             Pending Culture Results     Date and Time Order Name Status Description    9/23/2018 2111 Gram stain In process     9/23/2018 2111 Wound Culture Aerobic Bacterial In process     9/23/2018 2033 Blood culture In process     9/23/2018 2027 Blood culture In process             Pending Results Instructions     If you had any lab results that were not finalized at the time of your Discharge, you can call the ED Lab Result RN at [phone]. You will be contacted by this team for any positive Lab results or changes in treatment. The nurses are available 7 days a week from 10A to 6:30P.  You can leave a message 24 hours per day and they will return your call.        Test Results From Your Hospital Stay        9/23/2018  9:00 PM      Narrative     FOREARM TWO VIEWS RIGHT  9/23/2018 8:50 PM     HISTORY: Rule out midshaft ulnar foreign body.     COMPARISON: None.        Impression     IMPRESSION: No radiopaque foreign objects are seen. Soft tissue  swelling of the mid forearm. No evidence of fracture.    ZEINAB GOMEZ MD         9/23/2018  8:46 PM      Component Results     Component Value Ref Range & Units Status    WBC 13.9 (H) 4.0 - 11.0 10e9/L Final    RBC Count 4.37 (L) 4.4 - 5.9 10e12/L Final    Hemoglobin 13.8 13.3 - 17.7 g/dL Final    Hematocrit 41.7 40.0 - 53.0 % Final    MCV 95 78 - 100 fl Final     MCH 31.6 26.5 - 33.0 pg Final    MCHC 33.1 31.5 - 36.5 g/dL Final    RDW 12.2 10.0 - 15.0 % Final    Platelet Count 313 150 - 450 10e9/L Final    Diff Method Automated Method  Final    % Neutrophils 66.0 % Final    % Lymphocytes 23.2 % Final    % Monocytes 8.4 % Final    % Eosinophils 1.2 % Final    % Basophils 0.8 % Final    % Immature Granulocytes 0.4 % Final    Nucleated RBCs 0 0 /100 Final    Absolute Neutrophil 9.2 (H) 1.6 - 8.3 10e9/L Final    Absolute Lymphocytes 3.2 0.8 - 5.3 10e9/L Final    Absolute Monocytes 1.2 0.0 - 1.3 10e9/L Final    Absolute Eosinophils 0.2 0.0 - 0.7 10e9/L Final    Absolute Basophils 0.1 0.0 - 0.2 10e9/L Final    Abs Immature Granulocytes 0.1 0 - 0.4 10e9/L Final    Absolute Nucleated RBC 0.0  Final         9/23/2018  9:00 PM      Component Results     Component Value Ref Range & Units Status    Sodium 136 133 - 144 mmol/L Final    Potassium 4.3 3.4 - 5.3 mmol/L Final    Chloride 103 94 - 109 mmol/L Final    Carbon Dioxide 30 20 - 32 mmol/L Final    Anion Gap 3 3 - 14 mmol/L Final    Glucose 91 70 - 99 mg/dL Final    Urea Nitrogen 15 7 - 30 mg/dL Final    Creatinine 0.98 0.66 - 1.25 mg/dL Final    GFR Estimate 89 >60 mL/min/1.7m2 Final    Non  GFR Calc    GFR Estimate If Black >90 >60 mL/min/1.7m2 Final    African American GFR Calc    Calcium 8.9 8.5 - 10.1 mg/dL Final         9/23/2018  8:52 PM      Component Results     Component Value Ref Range & Units Status    Lactic Acid 1.2 0.7 - 2.0 mmol/L Final         9/23/2018  8:39 PM         9/23/2018  9:04 PM         9/23/2018  9:18 PM         9/23/2018  9:18 PM         9/23/2018  9:54 PM      Result not yet available     Exam Begun                Clinical Quality Measure: Blood Pressure Screening     Your blood pressure was checked while you were in the emergency department today. The last reading we obtained was  BP: 127/77 . Please read the guidelines below about what these numbers mean and what you should do about  "them.  If your systolic blood pressure (the top number) is less than 120 and your diastolic blood pressure (the bottom number) is less than 80, then your blood pressure is normal. There is nothing more that you need to do about it.  If your systolic blood pressure (the top number) is 120-139 or your diastolic blood pressure (the bottom number) is 80-89, your blood pressure may be higher than it should be. You should have your blood pressure rechecked within a year by a primary care provider.  If your systolic blood pressure (the top number) is 140 or greater or your diastolic blood pressure (the bottom number) is 90 or greater, you may have high blood pressure. High blood pressure is treatable, but if left untreated over time it can put you at risk for heart attack, stroke, or kidney failure. You should have your blood pressure rechecked by a primary care provider within the next 4 weeks.  If your provider in the emergency department today gave you specific instructions to follow-up with your doctor or provider even sooner than that, you should follow that instruction and not wait for up to 4 weeks for your follow-up visit.        Thank you for choosing [facility]       Thank you for choosing [facility] for your care. Our goal is always to provide you with excellent care. Hearing back from our patients is one way we can continue to improve our services. Please take a few minutes to complete the written survey that you may receive in the mail after you visit with us. Thank you!        thereNowhart Information     [employer] lets you send messages to your doctor, view your test results, renew your prescriptions, schedule appointments and more. To sign up, go to [email or web address] . Click on \"Log in\" on the left side of the screen, which will take you to the Welcome page. Then click on \"Sign up Now\" on the right side of the page.     You will be asked to enter the access code listed below, as well as some personal " information. Please follow the directions to create your username and password.     Your access code is: [email or web address]  Expires: 2018 10:15 PM     Your access code will  in 90 days. If you need help or a new code, please call your [facility] clinic or [phone].        Care EveryWhere ID     This is your Care EveryWhere ID. This could be used by other organizations to access your [facility] medical records  [id number]        Equal Access to Services     [facility] : Hadgunjan babino Soshamika, waaxda luqadaha, qaybta kaalmada adepreciousyairineo, massiel ortega . [facility] [phone].    ATENCIÓN: Si habla español, tiene a bertrand disposición servicios gratuitos de asistencia lingüística. Llame al [phone].    We comply with applicable federal civil rights laws and [place] laws. We do not discriminate on the basis of race, color, national origin, age, disability, sex, sexual orientation, or gender identity.            After Visit Summary       This is your record. Keep this with you and show to your community pharmacist(s) and doctor(s) at your next visit.

## 2018-09-23 NOTE — ED AVS SNAPSHOT
[facility] Emergency Department    [address]    [facility] [address]    Phone:  [phone]    Fax:  [phone]                                       Ted Borrero   MRN: [id number]    Department:  [facility] Emergency Department   Date of Visit:  9/23/2018           After Visit Summary Signature Page     I have received my discharge instructions, and my questions have been answered. I have discussed any challenges I see with this plan with the nurse or doctor.    ..........................................................................................................................................  Patient/Patient Representative Signature      ..........................................................................................................................................  Patient Representative Print Name and Relationship to Patient    ..................................................               ................................................  Date                                   Time    ..........................................................................................................................................  Reviewed by Signature/Title    ...................................................              ..............................................  Date                                               Time          22EPIC Rev 08/18

## 2018-09-24 ENCOUNTER — HOSPITAL ENCOUNTER (EMERGENCY)
Facility: CLINIC | Age: 33
Discharge: HOME | End: 2018-09-24
Attending: EMERGENCY MEDICINE | Admitting: EMERGENCY MEDICINE
Payer: MEDICAID

## 2018-09-24 VITALS — SYSTOLIC BLOOD PRESSURE: 115 MMHG | DIASTOLIC BLOOD PRESSURE: 98 MMHG | TEMPERATURE: 98.2 F | OXYGEN SATURATION: 97 %

## 2018-09-24 DIAGNOSIS — L03.113 CELLULITIS OF RIGHT UPPER EXTREMITY: ICD-10-CM

## 2018-09-24 LAB
BASOPHILS # BLD AUTO: 0.1 10E9/L (ref 0–0.2)
BASOPHILS NFR BLD AUTO: 0.8 %
CO2 BLDCOV-SCNC: 22 MMOL/L (ref 21–28)
DIFFERENTIAL METHOD BLD: ABNORMAL
EOSINOPHIL # BLD AUTO: 0.2 10E9/L (ref 0–0.7)
EOSINOPHIL NFR BLD AUTO: 2 %
ERYTHROCYTE [DISTWIDTH] IN BLOOD BY AUTOMATED COUNT: 12.3 % (ref 10–15)
GRAM STN SPEC: NORMAL
GRAM STN SPEC: NORMAL
HCT VFR BLD AUTO: 38.3 % (ref 40–53)
HGB BLD-MCNC: 12.9 G/DL (ref 13.3–17.7)
IMM GRANULOCYTES # BLD: 0.1 10E9/L (ref 0–0.4)
IMM GRANULOCYTES NFR BLD: 0.4 %
LACTATE BLD-SCNC: 0.6 MMOL/L (ref 0.7–2.1)
LYMPHOCYTES # BLD AUTO: 4.9 10E9/L (ref 0.8–5.3)
LYMPHOCYTES NFR BLD AUTO: 43.4 %
Lab: NORMAL
MCH RBC QN AUTO: 31.9 PG (ref 26.5–33)
MCHC RBC AUTO-ENTMCNC: 33.7 G/DL (ref 31.5–36.5)
MCV RBC AUTO: 95 FL (ref 78–100)
MONOCYTES # BLD AUTO: 1 10E9/L (ref 0–1.3)
MONOCYTES NFR BLD AUTO: 9.2 %
NEUTROPHILS # BLD AUTO: 5 10E9/L (ref 1.6–8.3)
NEUTROPHILS NFR BLD AUTO: 44.2 %
NRBC # BLD AUTO: 0 10*3/UL
NRBC BLD AUTO-RTO: 0 /100
PCO2 BLDV: 37 MM HG (ref 40–50)
PH BLDV: 7.39 PH (ref 7.32–7.43)
PLATELET # BLD AUTO: 286 10E9/L (ref 150–450)
PO2 BLDV: 69 MM HG (ref 25–47)
RBC # BLD AUTO: 4.05 10E12/L (ref 4.4–5.9)
SAO2 % BLDV FROM PO2: 93 %
SPECIMEN SOURCE: NORMAL
WBC # BLD AUTO: 11.3 10E9/L (ref 4–11)

## 2018-09-24 PROCEDURE — 76882 US LMTD JT/FCL EVL NVASC XTR: CPT

## 2018-09-24 PROCEDURE — 25000128 H RX IP 250 OP 636: Performed by: PHYSICIAN ASSISTANT

## 2018-09-24 PROCEDURE — 36415 COLL VENOUS BLD VENIPUNCTURE: CPT | Performed by: EMERGENCY MEDICINE

## 2018-09-24 PROCEDURE — 25000125 ZZHC RX 250: Performed by: PHYSICIAN ASSISTANT

## 2018-09-24 PROCEDURE — 96361 HYDRATE IV INFUSION ADD-ON: CPT

## 2018-09-24 PROCEDURE — 96374 THER/PROPH/DIAG INJ IV PUSH: CPT

## 2018-09-24 PROCEDURE — 83605 ASSAY OF LACTIC ACID: CPT

## 2018-09-24 PROCEDURE — 82803 BLOOD GASES ANY COMBINATION: CPT

## 2018-09-24 PROCEDURE — 25000128 H RX IP 250 OP 636: Performed by: EMERGENCY MEDICINE

## 2018-09-24 PROCEDURE — 99285 EMERGENCY DEPT VISIT HI MDM: CPT | Mod: 25

## 2018-09-24 PROCEDURE — 87040 BLOOD CULTURE FOR BACTERIA: CPT | Mod: 91 | Performed by: EMERGENCY MEDICINE

## 2018-09-24 PROCEDURE — 85025 COMPLETE CBC W/AUTO DIFF WBC: CPT | Performed by: EMERGENCY MEDICINE

## 2018-09-24 RX ORDER — SODIUM CHLORIDE 9 MG/ML
1000 INJECTION, SOLUTION INTRAVENOUS CONTINUOUS
Status: DISCONTINUED | OUTPATIENT
Start: 2018-09-24 | End: 2018-09-24 | Stop reason: HOSPADM

## 2018-09-24 RX ORDER — ONDANSETRON 4 MG/1
4 TABLET, FILM COATED ORAL EVERY 8 HOURS PRN
Qty: 6 TABLET | Refills: 0 | Status: ON HOLD | OUTPATIENT
Start: 2018-09-24 | End: 2019-07-28

## 2018-09-24 RX ORDER — ONDANSETRON 4 MG/1
8 TABLET, ORALLY DISINTEGRATING ORAL ONCE
Status: COMPLETED | OUTPATIENT
Start: 2018-09-24 | End: 2018-09-24

## 2018-09-24 RX ORDER — CEFAZOLIN SODIUM 2 G/100ML
2 INJECTION, SOLUTION INTRAVENOUS ONCE
Status: COMPLETED | OUTPATIENT
Start: 2018-09-24 | End: 2018-09-24

## 2018-09-24 RX ADMIN — CEFAZOLIN SODIUM 2 G: 2 INJECTION, SOLUTION INTRAVENOUS at 08:40

## 2018-09-24 RX ADMIN — ONDANSETRON 8 MG: 4 TABLET, ORALLY DISINTEGRATING ORAL at 09:24

## 2018-09-24 RX ADMIN — SODIUM CHLORIDE 1000 ML: 9 INJECTION, SOLUTION INTRAVENOUS at 08:40

## 2018-09-24 ASSESSMENT — ENCOUNTER SYMPTOMS
FEVER: 0
COLOR CHANGE: 1
VOMITING: 1
DIARRHEA: 0
NAUSEA: 1
WOUND: 1

## 2018-09-24 NOTE — ED TRIAGE NOTES
Pt presents with a puncture wound to his right forearm that likely happened on Friday, over the past few days it has become increasingly red, painful and swollen as well as oozing. Pt concerned for infection. Pt alert, oriented x3 ABCs intact

## 2018-09-24 NOTE — ED NOTES
Bed: ED16  Expected date: 9/24/18  Expected time: 7:00 AM  Means of arrival: Ambulance  Comments:  [id number]

## 2018-09-24 NOTE — ED AVS SNAPSHOT
[facility] Emergency Department    [address]    [facility] [address]    Phone:  [phone]    Fax:  [phone]                                       Ted Borrero   MRN: [id number]    Department:  [facility] Emergency Department   Date of Visit:  9/24/2018           After Visit Summary Signature Page     I have received my discharge instructions, and my questions have been answered. I have discussed any challenges I see with this plan with the nurse or doctor.    ..........................................................................................................................................  Patient/Patient Representative Signature      ..........................................................................................................................................  Patient Representative Print Name and Relationship to Patient    ..................................................               ................................................  Date                                   Time    ..........................................................................................................................................  Reviewed by Signature/Title    ...................................................              ..............................................  Date                                               Time          22EPIC Rev 08/18

## 2018-09-24 NOTE — ED TRIAGE NOTES
Last Friday pt hurt self while working on motorcycle.  Right forearm puncture by metal.  Was in [facility] ED yesterday evening for this concern.  Stated nauseous and vomited last night.  Called 911 for ride to ED.  Alert, able to walk.  VSS.

## 2018-09-24 NOTE — ED AVS SNAPSHOT
[facility] Emergency Department    [address]    [facility] [address]    Phone:  [phone]    Fax:  [phone]                                       Ted Borrero   MRN: [id number]    Department:  [facility] Emergency Department   Date of Visit:  9/24/2018           Patient Information     Date Of Birth          1985        Your diagnoses for this visit were:     Cellulitis of right upper extremity        You were seen by Misbah Stockton MD.      Follow-up Information     Follow up with Michoacano Gaytan MD In 1 week.    Specialty:  Family Practice    Contact information:    [facility]  [address] [facility] [address]  [phone]          Follow up with [facility] Emergency Department.    Specialty:  EMERGENCY MEDICINE    Why:  If symptoms worsen    Contact information:    [address]  [facility] [address]  [phone]        Discharge Instructions       Discharge Instructions  Cellulitis    Cellulitis is an infection of the skin that occurs when bacteria enter the skin.   Symptoms are generally redness, swelling, warmth and pain.  Your infection appeared to be appropriate to treat at home with antibiotics.  However, sometimes your infection may be worse than it seemed at first, or may worsen with time. If you have new or worse symptoms, you may need to be seen again in the Emergency Department or by your primary provider.    Generally, every Emergency Department visit should have a follow-up clinic visit with either a primary or a specialty clinic/provider. Please follow-up as instructed by your emergency provider today.    Return to the Emergency Department if:    The redness, pain, or swelling gets a lot worse.  If the red area was marked, return if it is red significantly beyond the marked area.    You are unable to get your antibiotics, or are vomiting (throwing up) these pills, or you  cannot take them.    You are feeling more ill, weak or lightheaded.    You start to run a new fever (temperature >101 F).    Anything else about the infection worries or concerns you.  Treatment:    Start your antibiotics right away, and take them as prescribed. Be sure to finish the whole prescription, even if you are better.    Apply a heating pad, warm packs, or warm water soaks to the infected area for 15 minutes at a time, at least 3 times a day. Do not use a heating pad on your feet or legs if you have diabetes. Do not sleep with a heating pad on, since this can cause burns or skin injury.    Rest your injured area for at least 1-2 days. After that you may start using your extremity again as long as there is not too much pain.     Raise the injured area above the level of your heart as much as possible in the first 1-2 days.    Tylenol  (acetaminophen), Motrin  (ibuprofen), or Advil  (ibuprofen) may help may help reduce pain and fever and may help you feel more comfortable. Be sure to read and follow the package directions, and ask your provider if you have questions.    If you were given a prescription for medicine here today, be sure to read all of the information (including the package insert) that comes with your prescription.  This will include important information about the medicine, its side effects, and any warnings that you need to know about.  The pharmacist who fills the prescription can provide more information and answer questions you may have about the medicine.  If you have questions or concerns that the pharmacist cannot address, please call or return to the Emergency Department.     Remember that you can always come back to the Emergency Department if you are not able to see your regular provider in the amount of time listed above, if you get any new symptoms, or if there is anything that worries you.      24 Hour Appointment Hotline       To make an appointment at any [facility], call  [phone] ([phone]). If you don't have a family doctor or clinic, we will help you find one. [facility] clinics are conveniently located to serve the needs of you and your family.             Review of your medicines      START taking        Dose / Directions Last dose taken    ondansetron 4 MG tablet   Commonly known as:  ZOFRAN   Dose:  4 mg   Quantity:  6 tablet        Take 1 tablet (4 mg) by mouth every 8 hours as needed for nausea   Refills:  0          Our records show that you are taking the medicines listed below. If these are incorrect, please call your family doctor or clinic.        Dose / Directions Last dose taken    ADDERALL PO        Refills:  0        cephALEXin 500 MG capsule   Commonly known as:  KEFLEX   Dose:  500 mg   Quantity:  40 capsule        Take 1 capsule (500 mg) by mouth 4 times daily for 10 days   Refills:  0        HYDROcodone-acetaminophen 5-325 MG per tablet   Commonly known as:  NORCO   Dose:  1-2 tablet   Quantity:  15 tablet        Take 1-2 tablets by mouth every 4 hours as needed for moderate to severe pain   Refills:  0        ibuprofen 200 MG tablet   Commonly known as:  ADVIL/MOTRIN   Dose:  600 mg   Quantity:  30 tablet        Take 3 tablets (600 mg) by mouth every 8 hours as needed for mild pain   Refills:  0        methylPREDNISolone 4 MG tablet   Commonly known as:  MEDROL   Dose:  4 mg   Quantity:  21 tablet        Take 1 tablet (4 mg) by mouth See Admin Instructions follow package directions   Refills:  0        order for DME   Quantity:  1 Units        Right hand thumb spica splint   Refills:  0                Prescriptions were sent or printed at these locations (1 Prescription)                   Other Prescriptions                Printed at Department/Unit printer (1 of 1)         ondansetron (ZOFRAN) 4 MG tablet                Procedures and tests performed during your visit     Procedure/Test Number of Times Performed    Blood culture 2    CBC +  differential 1    ISTAT CG4 gases lactate kamryn nursing POCT 1    ISTAT gases lactate kamryn POCT 1    POC US SOFT TISSUE 1      Orders Needing Specimen Collection     None      Pending Results     Date and Time Order Name Status Description    9/24/2018 0917 POC US SOFT TISSUE In process     9/24/2018 0800 Blood culture In process     9/24/2018 0800 Blood culture In process     9/23/2018 2154 POC US SOFT TISSUE In process     9/23/2018 2111 Wound Culture Aerobic Bacterial Preliminary     9/23/2018 2033 Blood culture Preliminary     9/23/2018 2027 Blood culture Preliminary             Pending Culture Results     Date and Time Order Name Status Description    9/24/2018 0800 Blood culture In process     9/24/2018 0800 Blood culture In process     9/23/2018 2111 Wound Culture Aerobic Bacterial Preliminary     9/23/2018 2033 Blood culture Preliminary     9/23/2018 2027 Blood culture Preliminary             Pending Results Instructions     If you had any lab results that were not finalized at the time of your Discharge, you can call the ED Lab Result RN at [phone]. You will be contacted by this team for any positive Lab results or changes in treatment. The nurses are available 7 days a week from 10A to 6:30P.  You can leave a message 24 hours per day and they will return your call.        Test Results From Your Hospital Stay        9/24/2018  9:01 AM      Component Results     Component Value Ref Range & Units Status    WBC 11.3 (H) 4.0 - 11.0 10e9/L Final    RBC Count 4.05 (L) 4.4 - 5.9 10e12/L Final    Hemoglobin 12.9 (L) 13.3 - 17.7 g/dL Final    Hematocrit 38.3 (L) 40.0 - 53.0 % Final    MCV 95 78 - 100 fl Final    MCH 31.9 26.5 - 33.0 pg Final    MCHC 33.7 31.5 - 36.5 g/dL Final    RDW 12.3 10.0 - 15.0 % Final    Platelet Count 286 150 - 450 10e9/L Final    Diff Method Automated Method  Final    % Neutrophils 44.2 % Final    % Lymphocytes 43.4 % Final    % Monocytes 9.2 % Final    % Eosinophils 2.0 % Final    %  Basophils 0.8 % Final    % Immature Granulocytes 0.4 % Final    Nucleated RBCs 0 0 /100 Final    Absolute Neutrophil 5.0 1.6 - 8.3 10e9/L Final    Absolute Lymphocytes 4.9 0.8 - 5.3 10e9/L Final    Absolute Monocytes 1.0 0.0 - 1.3 10e9/L Final    Absolute Eosinophils 0.2 0.0 - 0.7 10e9/L Final    Absolute Basophils 0.1 0.0 - 0.2 10e9/L Final    Abs Immature Granulocytes 0.1 0 - 0.4 10e9/L Final    Absolute Nucleated RBC 0.0  Final         9/24/2018  8:55 AM         9/24/2018  8:56 AM               9/24/2018  8:44 AM      Component Results     Component Value Ref Range & Units Status    Ph Venous 7.39 7.32 - 7.43 pH Final    PCO2 Venous 37 (L) 40 - 50 mm Hg Final    PO2 Venous 69 (H) 25 - 47 mm Hg Final    Bicarbonate Venous 22 21 - 28 mmol/L Final    O2 Sat Venous 93 % Final    Lactic Acid 0.6 (L) 0.7 - 2.1 mmol/L Final         9/24/2018  9:17 AM      Result not yet available     Exam Begun                Clinical Quality Measure: Blood Pressure Screening     Your blood pressure was checked while you were in the emergency department today. The last reading we obtained was  BP: (!) 115/98 . Please read the guidelines below about what these numbers mean and what you should do about them.  If your systolic blood pressure (the top number) is less than 120 and your diastolic blood pressure (the bottom number) is less than 80, then your blood pressure is normal. There is nothing more that you need to do about it.  If your systolic blood pressure (the top number) is 120-139 or your diastolic blood pressure (the bottom number) is 80-89, your blood pressure may be higher than it should be. You should have your blood pressure rechecked within a year by a primary care provider.  If your systolic blood pressure (the top number) is 140 or greater or your diastolic blood pressure (the bottom number) is 90 or greater, you may have high blood pressure. High blood pressure is treatable, but if left untreated over time it can put you  "at risk for heart attack, stroke, or kidney failure. You should have your blood pressure rechecked by a primary care provider within the next 4 weeks.  If your provider in the emergency department today gave you specific instructions to follow-up with your doctor or provider even sooner than that, you should follow that instruction and not wait for up to 4 weeks for your follow-up visit.        Thank you for choosing [facility]       Thank you for choosing [facility] for your care. Our goal is always to provide you with excellent care. Hearing back from our patients is one way we can continue to improve our services. Please take a few minutes to complete the written survey that you may receive in the mail after you visit with us. Thank you!        [facility] Information     [employer] lets you send messages to your doctor, view your test results, renew your prescriptions, schedule appointments and more. To sign up, go to [email or web address] . Click on \"Log in\" on the left side of the screen, which will take you to the Welcome page. Then click on \"Sign up Now\" on the right side of the page.     You will be asked to enter the access code listed below, as well as some personal information. Please follow the directions to create your username and password.     Your access code is: [email or web address]  Expires: 2018 10:15 PM     Your access code will  in 90 days. If you need help or a new code, please call your [facility] clinic or [phone].        Care EveryWhere ID     This is your Care EveryWhere ID. This could be used by other organizations to access your [facility] medical records  [id number]        Equal Access to Services     [facility] AreaAN[facility] : Hadii baron hernández Soshamika, waaxda luqadaha, qaybta kaalmamassiel cooper . So [facility] [phone].    ATENCIÓN: Si habla español, tiene a bertrand disposición servicios gratuitos de asistencia lingüística. Llame al [phone].    We comply " with applicable federal civil rights laws and [place] laws. We do not discriminate on the basis of race, color, national origin, age, disability, sex, sexual orientation, or gender identity.            After Visit Summary       This is your record. Keep this with you and show to your community pharmacist(s) and doctor(s) at your next visit.

## 2018-09-24 NOTE — ED PROVIDER NOTES
History     Chief Complaint:  Arm Pain    HPI   Ted Borrero is a 33 year old male who presents to the emergency department for evaluation of arm pain. The patient reports he injured his right forearm while cleaning his motorcycle last week, resulting in a wound that has since developed redness and swelling. Of note, the patient presented yesterday with the same symptoms and discharged with a prescription for Keflex; he was instructed to return with increasing spreading redness. He states he awoke today around 0300 with nausea and had 2-3 episodes of vomiting around [id number]. He remarks the redness had spread beyond the marked line, prompting him to contact EMS and present to the ED. The patient indicates he has not been able to take his prescribed Keflex due to his discharge time at 2200 last night. The patient denies any bowel movements since discharge yesterday, and he further denies any fever. He has not taken any medications for pain management or nausea today.    Allergies:  NKDA     Medications:    Adderall  Medrol     Past Medical History:    ADD    Past Surgical History:    The patient does not have any pertinent past surgical history.    Family History:    No past pertinent family history.    Social History:  Presents alone.  Current every day smoker.  Positive for alcohol use.   Marital Status:   [2]     Review of Systems   Constitutional: Negative for fever.   Gastrointestinal: Positive for nausea and vomiting. Negative for diarrhea.   Skin: Positive for color change and wound.   All other systems reviewed and are negative.    Physical Exam     Patient Vitals for the past 24 hrs:   BP Temp Temp src Heart Rate SpO2   09/24/18 1004 (!) 115/98 - - - -   09/24/18 0936 - - - - 97 %   09/24/18 0930 - - - - 99 %   09/24/18 0924 - - - - 98 %   09/24/18 0915 - - - - 98 %   09/24/18 0912 - - - - 98 %   09/24/18 0845 - - - - 97 %   09/24/18 0830 - - - - 99 %   09/24/18 0815 - - - - 98 %   09/24/18 0800  - - - - 96 %   09/24/18 0745 - - - - 96 %   09/24/18 0736 129/86 - - - -   09/24/18 0730 - - - - 96 %   09/24/18 0724 - 98.2  F (36.8  C) Oral 86 96 %     Physical Exam    General: Well-nourished, appears stated age  Eyes: PERRL, conjunctivae pink no scleral icterus or conjunctival injection  ENT:  Moist mucus membranes, pharynx nonerythematous and without exudate  Respiratory:  No respiratory distress, no wheezes, crackles or rales  CV: Normal rate, no murmurs, rubs or gallops  GI: Nontender, nondistended, normal bowel sounds, no rebound or guarding  : No CVA tenderness  Skin: cellulitis on right forearm. 14cm x 8 cm area of erythema with small area of central blanhing that appears to be have some purulent drainage. No fluctuance, POCUS negative for abscess. otherwise Warm, dry, no petechiae  Musculoskeletal: No peripheral edema or calf tenderness, strength 5 out of 5 throughout  Neuro: GCS 15, alert and oriented to person/place/time, cranial nerves II through XII intact  Psychiatric: Normal affect      Emergency Department Course     Laboratory:  CBC: WBC: 11.3 (H), HGB: 12.9 (L), PLT: 286    ISTAT gases lactate kamryn POCT: pCO2 Venous 37 (L), pO2 69 (H), Lactic Acid 0.6 (L), o/w negative    Blood culture x2 pending.    Procedures:        Interventions:  0840 NS 1L IV BOLUS   Ancef 2 g IV  0924 Zofran-ODT 8 mg PO    Emergency Department Course:  Nursing notes and vitals reviewed. 0740 I performed an exam of the patient as documented above.     IV inserted. Medicine administered as documented above. Blood drawn. This was sent to the lab for further testing, results above.    0935 Dr. Stockton performed a POCUS at the patient's bedside.    1009 I rechecked the patient and discussed the results of his workup thus far.     Findings and plan explained to the Patient. Patient discharged home with instructions regarding supportive care, medications, and reasons to return. The importance of close follow-up was reviewed.  The patient was prescribed Zofran.    I personally reviewed the laboratory results with the Patient and answered all related questions prior to discharge.     Impression & Plan      Medical Decision Making:    Ted Borrero is a 33 year old male who presents for evaluation of known cellulitis with associated vomitng at home.  Was diagnosed with cellulitis yesterday related to a puncture wound from 1 week ago.   There do not appear at this time to be any complication of cellulitis including necrotizing fascitis, lymphangitis, lymphadenitis, abscess, osteomyelitis, sepsis, or shock.  She returned to the emergency department today for nausea and vomiting that occurred at approximately 3 AM today.  His area of cellulitis has shrunk compared to yesterday per patient.  Due to his subjective confusion and his nausea I did repeat all of his left yesterday his white count is consistent consistent with his labs yesterday and his lactate is not elevated.  He was given 2 g of Ancef again in the emergency department today.  Bedside ultrasound was performed by Dr. Stockton (please see his note for details) but there was no appearance of any foreign body or abscess formation.  I did recommend inpatient admission but the patient declined.  Patient still has his prescription for Keflex from his ED visit yesterday I recommend he fill that and continue to use it to completion.  He was also discharged today with a prescription of Zofran to help with his nausea.  The patient is not immunosuppressed.  Close follow-up of primary care physician to ensure no progression and rapid resolution.  Area of cellulitis was outlined.  Cellulitis precautions for home.        Diagnosis:    ICD-10-CM   1. Cellulitis of right upper extremity L03.113           Disposition:  discharged to home    Discharge Medications:  Discharge Medication List as of 9/24/2018 10:13 AM      START taking these medications    Details   ondansetron (ZOFRAN) 4 MG tablet  Take 1 tablet (4 mg) by mouth every 8 hours as needed for nausea, Disp-6 tablet, R-0, Local Print           I, Edward Garcia, am serving as a scribe on 9/24/2018 at 7:36 AM to personally document services performed by Erick Rasmussen PA-C and Misbah Stockton MD based on my observations and the provider's statements to me.     Edward Garcia  9/24/2018   [facility] EMERGENCY DEPARTMENT       Erick Rasmussen PA-C  09/24/18 [id number]

## 2018-09-25 NOTE — ED PROVIDER NOTES
History     Chief Complaint:    Wound Check      HPI   Ted Borrero is a 33 year old male who presents with sustained injury to the ulnar aspect of his right forearm in the mid section 2 days ago.  He pushed off the bottom of his garage floor with that part of his arm and felt something sharp stick him.  Today he notices swelling and he had some discharge that was yellowish, moderate.  He also says it hurts.  He denies any fever or chills.  He denies any pain in that arm..  Is not diabetic.  He states he is previously healthy.    Allergies:    No Known Allergies     Medications:        cephALEXin (KEFLEX) 500 MG capsule   ibuprofen (ADVIL/MOTRIN) 200 MG tablet   Amphetamine-Dextroamphetamine (ADDERALL PO)   HYDROcodone-acetaminophen (NORCO) 5-325 MG per tablet   methylPREDNISolone (MEDROL) 4 MG tablet   ondansetron (ZOFRAN) 4 MG tablet   order for DME       Problem List:      There are no active problems to display for this patient.       Past Medical History:      Past Medical History:   Diagnosis Date     ADD (attention deficit disorder)        Past Surgical History:      No past surgical history on file.    Family History:      No family history on file.    Social History:    Marital Status:   [2]  Social History   Substance Use Topics     Smoking status: Current Every Day Smoker     Smokeless tobacco: Never Used     Alcohol use Yes        Review of Systems  See HPI all other systems negative    Physical Exam   First Vitals:  BP: (!) 140/99  Pulse: 107  Heart Rate: 107  Temp: 98.3  F (36.8  C)  Resp: 16  SpO2: 99 %      Physical Exam  General: alert  HEENT:   The scalp and head appear normal    The pupils are equal, round, and reactive to light    Extraocular muscles are intact.    The nose is normal.    The oropharynx is normal.      Uvula is in the midline.    Neck:  Normal range of motion.    Lungs:  Clear.      No rales, no wheezing.      There is no tachypnea.  Non-labored.  Cardiac: Regular  rate.      Normal S1 and S2.      No pathological murmur/rub    Abdomen: Soft. No distension, no localized tenderness or rebound.  MS:  Normal tone. Normal movement of all extremities.   Neurologic:     Normal mentation.  No cranial nerve deficits.  No focal motor or sensory                            changes.      Speech normal.  Psych:  Awake.     Alert.      Normal affect.      Appropriate interactions.  Skin:  No rash.      Right forearm on the ulnar aspect in the mid section there is a half dollar sized area of swelling in the center area that shows some slight discharge that is yellowish which was sent for culture and Gram stain.  X-ray shows no foreign body neither did a point-of-care ultrasound which shows cobblestoning consistent with cellulitis.  There is no evidence of ascending lymphangitis and no axillary adenopathy on the affected side.    Emergency Department Course       Imaging:  Radius/Ulna XR, PA & LAT, right   Final Result   IMPRESSION: No radiopaque foreign objects are seen. Soft tissue   swelling of the mid forearm. No evidence of fracture.      ZEINAB GOMEZ MD      POC US SOFT TISSUE    (Results Pending)         Laboratory:  Labs Ordered and Resulted from Time of ED Arrival Up to the Time of Departure from the ED   CBC WITH PLATELETS DIFFERENTIAL - Abnormal; Notable for the following:        Result Value    WBC 13.9 (*)     RBC Count 4.37 (*)     Absolute Neutrophil 9.2 (*)     All other components within normal limits   BASIC METABOLIC PANEL   LACTIC ACID WHOLE BLOOD   PULSE OXIMETRY NURSING         Procedures:    POC U/S SHOWS COBBLESTONING BUT NO ABSCESS NOR FOREIGN BODY.          ED Course       Impression & Plan             Medical Decision Making:  Patient is a 33-year-old male who sustained an injury to the right forearm which now is infected with cellulitis.  It did drain earlier today and likely had started to have an abscess formation which is now drained as the ultrasound is  negative for fluid collection.  He is up-to-date on his tetanus.  He was given 2 g of Ancef and instructed to use warm moist compresses 3 times to 4 times daily for 10-15 minutes use Keflex 500 mg p.o. 4 times daily for 10 days.  The area was highlighted with an indelible skin marker and he will follow-up tomorrow for recheck with his PCP.  He should return if he has marked redness spreading up his arm fever chills severe pain or other symptoms    Diagnosis:    ICD-10-CM    1. Cellulitis of right upper extremity L03.113 Blood culture     Blood culture     Wound Culture Aerobic Bacterial     Gram stain       Disposition:  HOME    Discharge Medications:  Discharge Medication List as of 9/23/2018 10:15 PM      START taking these medications    Details   cephALEXin (KEFLEX) 500 MG capsule Take 1 capsule (500 mg) by mouth 4 times daily for 10 days, Disp-40 capsule, R-0, Local Print      ibuprofen (ADVIL/MOTRIN) 200 MG tablet Take 3 tablets (600 mg) by mouth every 8 hours as needed for mild pain, Disp-30 tablet, R-0, Local Print               Michoacano Harper  9/23/2018   [facility] EMERGENCY DEPARTMENT       Michoacano Harper MD  09/24/18 [id number]

## 2018-09-26 ENCOUNTER — TELEPHONE (OUTPATIENT)
Dept: EMERGENCY MEDICINE | Facility: CLINIC | Age: 33
End: 2018-09-26

## 2018-09-26 DIAGNOSIS — A49.02 MRSA INFECTION: ICD-10-CM

## 2018-09-26 LAB
BACTERIA SPEC CULT: ABNORMAL
Lab: ABNORMAL
SPECIMEN SOURCE: ABNORMAL

## 2018-09-26 RX ORDER — CLINDAMYCIN HCL 150 MG
450 CAPSULE ORAL 3 TIMES DAILY
Qty: 63 CAPSULE | Refills: 0 | Status: SHIPPED | OUTPATIENT
Start: 2018-09-26 | End: 2018-10-03

## 2018-09-26 NOTE — TELEPHONE ENCOUNTER
[facility] Emergency Department Lab result notification [Adult-Male]    [facility] ED lab result protocol used  General Culture    Reason for call  Notify of lab results, assess symptoms,  review ED providers recommendations/discharge instructions (if necessary) and advise per ED lab result f/u protocol    Lab Result (including Rx patient on, if applicable)    Final Wound culture (Right forearm) report on 9/26/18  Emergency Dept discharge antibiotic prescribed: Cephalexin (Keflex) 500 mg capsule, 1 capsule (500 mg) by mouth 4 times daily for 10 days.  #1. Bacteria, Light growth Methicillin resistant Staphylococcus aureus, which is [RESISTANT] to antibiotic   Incision and Drainage performed in [place] ED [Yes / No]: No  Patient to be notified of result, symptoms assessed and advised per [place] ED lab result protocol.      Information table from ED Provider visit on 9/23/18  Symptoms reported at ED visit (Chief complaint, HPI) Ted Borrero is a 33 year old male who presents with sustained injury to the ulnar aspect of his right forearm in the mid section 2 days ago.  He pushed off the bottom of his garage floor with that part of his arm and felt something sharp stick him.  Today he notices swelling and he had some discharge that was yellowish, moderate.  He also says it hurts.  He denies any fever or chills.  He denies any pain in that arm..  Is not diabetic.  He states he is previously healthy   Significant Medical hx, if applicable (i.e. CKD, diabetes) None   Allergies No Known Allergies   Weight, if applicable Wt Readings from Last 2 Encounters:   05/20/18 106.4 kg (234 lb 9.6 oz)   11/09/17 99.8 kg (220 lb)      Coumadin/Warfarin [Yes /No] No   Creatinine Level (mg/dl) Creatinine   Date Value Ref Range Status   09/23/2018 0.98 0.66 - 1.25 mg/dL Final      Creatinine clearance (ml/min), if applicable CREATININE: 0.98 mg/dL (09/23/18 2030)  Estimated creatinine clearance: 131.3 mL/min   ED providers  "Impression and Plan (applicable information) Patient is a 33-year-old male who sustained an injury to the right forearm which now is infected with cellulitis.  It did drain earlier today and likely had started to have an abscess formation which is now drained as the ultrasound is negative for fluid collection.  He is up-to-date on his tetanus.  He was given 2 g of Ancef and instructed to use warm moist compresses 3 times to 4 times daily for 10-15 minutes use Keflex 500 mg p.o. 4 times daily for 10 days.  The area was highlighted with an indelible skin marker and he will follow-up tomorrow for recheck with his PCP.  He should return if he has marked redness spreading up his arm fever chills severe pain or other symptoms   ED diagnosis Cellulitis of right upper extremity   ED provider Michoacano Harper MD      RN Assessment (Patient s current Symptoms), include time called.  [Insert Left message here if message left]  Today Ted reports right arm is \"still purple robbie and kind of red around the area\".   Yesterday was leaking fluid, today is dry.  No fever per report, hasn't checked temp though.  No extension of redness beyond the drawn margins but remains red in the entire area.  No f/u with PCP thus far.  Did f/u in ED the following day (9/24/18) for ongoing nausea/ vomiting, now taking Zofran and reports no concerns related to this today.           RN Recommendations/Instructions per [facility] ED lab result protocol  Patient notified of lab result and treatment recommendations.  Rx for Clindamycin sent to [Pharmacy - [facility] in [facility]].  RN reviewed information about continuing Keflex for cellulitis.  Did encourage importance of f/u with PCP.  Reviewed general information including infection control related to MRSA.    [facility] Emergency Department Provider Name & Recommendations (included time consulted)  Did discuss with Dr. Mckeon in [facility] at 12:13 pm.  To start Clindamycin 450 mg PO TID x 7 " days.       Please Contact your PCP clinic or return to the Emergency department if your:    Symptoms return.    Symptoms do not improve after 3 days on antibiotic.    Symptoms do not resolve after completing antibiotic.    Symptoms worsen or other concerning symptom's.    PCP follow-up Questions asked: YES       Valeria Brady RN    [facility] RN  Lung Nodule and ED Lab Results F/U RN  Epic [place] (ED late result f/u RN) : P [id number]   # [phone]    Copy of Lab result  Order   Wound Culture Aerobic Bacterial [[id number]] (Order [id number])   Exam Information   Exam Date Exam Time Accession # Results    9/23/18  8:20 PM [id number]    Component Results   Component Collected Lab   Specimen Description 09/23/2018  8:20    Right FOREARM   Special Requests 09/23/2018  8:20 PM 75   Specimen collected in eSwab transport (white cap)   Culture Micro (Abnormal) 09/23/2018  8:20    Light growth   Methicillin resistant Staphylococcus aureus (MRSA)   This isolate DOES NOT demonstrate inducible clindamycin resistance in vitro. Clindamycin   is susceptible and could be used when indicated, however, erythromycin is resistant and   should not be used.      Culture & Susceptibility   METHICILLIN RESISTANT STAPHYLOCOCCUS AUREUS (MRSA)   Antibiotic Interpretation Sensitivity Unit Method Status   CLINDAMYCIN Sensitive <=0.25 ug/mL COMFORT Final   ERYTHROMYCIN Resistant >=8 ug/mL COMFORT Final   GENTAMICIN Sensitive <=0.5 ug/mL COMFORT Final   LINEZOLID Sensitive 2 ug/mL COMFORT Final   OXACILLIN Resistant >=4 ug/mL COMFORT Final   PENICILLIN Resistant >=0.5 ug/mL COMFORT Final   TETRACYCLINE Sensitive <=1 ug/mL COMFORT Final   Trimethoprim/Sulfa Resistant 4/76 ug/mL COMFORT Final   VANCOMYCIN Sensitive <=0.5 ug/mL COMFORT Final

## 2018-09-29 LAB
BACTERIA SPEC CULT: NO GROWTH
BACTERIA SPEC CULT: NO GROWTH
Lab: NORMAL
Lab: NORMAL
SPECIMEN SOURCE: NORMAL
SPECIMEN SOURCE: NORMAL

## 2019-05-13 ENCOUNTER — HOSPITAL ENCOUNTER (EMERGENCY)
Facility: CLINIC | Age: 34
Discharge: HOME | End: 2019-05-13
Attending: EMERGENCY MEDICINE | Admitting: EMERGENCY MEDICINE
Payer: COMMERCIAL

## 2019-05-13 ENCOUNTER — APPOINTMENT (OUTPATIENT)
Dept: ULTRASOUND IMAGING | Facility: CLINIC | Age: 34
End: 2019-05-13
Attending: EMERGENCY MEDICINE
Payer: COMMERCIAL

## 2019-05-13 VITALS
RESPIRATION RATE: 16 BRPM | WEIGHT: 235.01 LBS | BODY MASS INDEX: 34.71 KG/M2 | OXYGEN SATURATION: 97 % | SYSTOLIC BLOOD PRESSURE: 139 MMHG | HEART RATE: 75 BPM | TEMPERATURE: 97.7 F | DIASTOLIC BLOOD PRESSURE: 90 MMHG

## 2019-05-13 DIAGNOSIS — R11.10 VOMITING AND DIARRHEA: ICD-10-CM

## 2019-05-13 DIAGNOSIS — R10.13 ABDOMINAL PAIN, EPIGASTRIC: ICD-10-CM

## 2019-05-13 DIAGNOSIS — R19.7 VOMITING AND DIARRHEA: ICD-10-CM

## 2019-05-13 LAB
ALBUMIN SERPL-MCNC: 4 G/DL (ref 3.4–5)
ALP SERPL-CCNC: 67 U/L (ref 40–150)
ALT SERPL W P-5'-P-CCNC: 33 U/L (ref 0–70)
ANION GAP SERPL CALCULATED.3IONS-SCNC: 3 MMOL/L (ref 3–14)
AST SERPL W P-5'-P-CCNC: 25 U/L (ref 0–45)
BASOPHILS # BLD AUTO: 0.1 10E9/L (ref 0–0.2)
BASOPHILS NFR BLD AUTO: 0.5 %
BILIRUB SERPL-MCNC: 0.3 MG/DL (ref 0.2–1.3)
BUN SERPL-MCNC: 12 MG/DL (ref 7–30)
CALCIUM SERPL-MCNC: 8.6 MG/DL (ref 8.5–10.1)
CHLORIDE SERPL-SCNC: 111 MMOL/L (ref 94–109)
CO2 SERPL-SCNC: 26 MMOL/L (ref 20–32)
CREAT SERPL-MCNC: 0.77 MG/DL (ref 0.66–1.25)
DIFFERENTIAL METHOD BLD: ABNORMAL
EOSINOPHIL # BLD AUTO: 0.3 10E9/L (ref 0–0.7)
EOSINOPHIL NFR BLD AUTO: 2.7 %
ERYTHROCYTE [DISTWIDTH] IN BLOOD BY AUTOMATED COUNT: 12.4 % (ref 10–15)
GFR SERPL CREATININE-BSD FRML MDRD: >90 ML/MIN/{1.73_M2}
GLUCOSE SERPL-MCNC: 78 MG/DL (ref 70–99)
HCT VFR BLD AUTO: 46.3 % (ref 40–53)
HGB BLD-MCNC: 14.6 G/DL (ref 13.3–17.7)
IMM GRANULOCYTES # BLD: 0.2 10E9/L (ref 0–0.4)
IMM GRANULOCYTES NFR BLD: 1.3 %
LIPASE SERPL-CCNC: 253 U/L (ref 73–393)
LYMPHOCYTES # BLD AUTO: 3.7 10E9/L (ref 0.8–5.3)
LYMPHOCYTES NFR BLD AUTO: 32.4 %
MCH RBC QN AUTO: 31 PG (ref 26.5–33)
MCHC RBC AUTO-ENTMCNC: 31.5 G/DL (ref 31.5–36.5)
MCV RBC AUTO: 98 FL (ref 78–100)
MONOCYTES # BLD AUTO: 0.9 10E9/L (ref 0–1.3)
MONOCYTES NFR BLD AUTO: 7.8 %
NEUTROPHILS # BLD AUTO: 6.3 10E9/L (ref 1.6–8.3)
NEUTROPHILS NFR BLD AUTO: 55.3 %
NRBC # BLD AUTO: 0 10*3/UL
NRBC BLD AUTO-RTO: 0 /100
PLATELET # BLD AUTO: 327 10E9/L (ref 150–450)
POTASSIUM SERPL-SCNC: 4.2 MMOL/L (ref 3.4–5.3)
PROT SERPL-MCNC: 8 G/DL (ref 6.8–8.8)
RBC # BLD AUTO: 4.71 10E12/L (ref 4.4–5.9)
SODIUM SERPL-SCNC: 140 MMOL/L (ref 133–144)
WBC # BLD AUTO: 11.4 10E9/L (ref 4–11)

## 2019-05-13 PROCEDURE — 96374 THER/PROPH/DIAG INJ IV PUSH: CPT

## 2019-05-13 PROCEDURE — 96361 HYDRATE IV INFUSION ADD-ON: CPT

## 2019-05-13 PROCEDURE — 99285 EMERGENCY DEPT VISIT HI MDM: CPT | Mod: 25

## 2019-05-13 PROCEDURE — 96375 TX/PRO/DX INJ NEW DRUG ADDON: CPT

## 2019-05-13 PROCEDURE — 76705 ECHO EXAM OF ABDOMEN: CPT

## 2019-05-13 PROCEDURE — 85025 COMPLETE CBC W/AUTO DIFF WBC: CPT | Performed by: EMERGENCY MEDICINE

## 2019-05-13 PROCEDURE — 80053 COMPREHEN METABOLIC PANEL: CPT | Performed by: EMERGENCY MEDICINE

## 2019-05-13 PROCEDURE — 83690 ASSAY OF LIPASE: CPT | Performed by: EMERGENCY MEDICINE

## 2019-05-13 PROCEDURE — 25000128 H RX IP 250 OP 636: Performed by: EMERGENCY MEDICINE

## 2019-05-13 RX ORDER — ONDANSETRON 2 MG/ML
4 INJECTION INTRAMUSCULAR; INTRAVENOUS
Status: COMPLETED | OUTPATIENT
Start: 2019-05-13 | End: 2019-05-13

## 2019-05-13 RX ORDER — MORPHINE SULFATE 4 MG/ML
4 INJECTION, SOLUTION INTRAMUSCULAR; INTRAVENOUS
Status: COMPLETED | OUTPATIENT
Start: 2019-05-13 | End: 2019-05-13

## 2019-05-13 RX ORDER — SODIUM CHLORIDE 9 MG/ML
1000 INJECTION, SOLUTION INTRAVENOUS CONTINUOUS
Status: DISCONTINUED | OUTPATIENT
Start: 2019-05-13 | End: 2019-05-13 | Stop reason: HOSPADM

## 2019-05-13 RX ORDER — ONDANSETRON 4 MG/1
4 TABLET, ORALLY DISINTEGRATING ORAL EVERY 8 HOURS PRN
Qty: 10 TABLET | Refills: 0 | Status: ON HOLD | OUTPATIENT
Start: 2019-05-13 | End: 2019-07-28

## 2019-05-13 RX ADMIN — SODIUM CHLORIDE 1000 ML: 9 INJECTION, SOLUTION INTRAVENOUS at 09:07

## 2019-05-13 RX ADMIN — MORPHINE SULFATE 4 MG: 4 INJECTION INTRAVENOUS at 09:10

## 2019-05-13 RX ADMIN — ONDANSETRON 4 MG: 2 INJECTION INTRAMUSCULAR; INTRAVENOUS at 09:07

## 2019-05-13 ASSESSMENT — ENCOUNTER SYMPTOMS
ABDOMINAL PAIN: 1
DIARRHEA: 1
NAUSEA: 1
VOMITING: 1

## 2019-05-13 NOTE — ED AVS SNAPSHOT
[facility] Emergency Department  [address]  [facility] [address]  Phone:  [phone]  Fax:  [phone]                                    Ted Borrero   MRN: [id number]    Department:  [facility] Emergency Department   Date of Visit:  5/13/2019           After Visit Summary Signature Page    I have received my discharge instructions, and my questions have been answered. I have discussed any challenges I see with this plan with the nurse or doctor.    ..........................................................................................................................................  Patient/Patient Representative Signature      ..........................................................................................................................................  Patient Representative Print Name and Relationship to Patient    ..................................................               ................................................  Date                                   Time    ..........................................................................................................................................  Reviewed by Signature/Title    ...................................................              ..............................................  Date                                               Time          22EPIC Rev 08/18

## 2019-05-13 NOTE — ED PROVIDER NOTES
History     Chief Complaint:  Abdominal Pain, Nausea, Vomiting & Diarrhea    HPI   Ted Borrero is a 34 year old male who presents with abdominal pain, nausea, vomiting and diarrhea. The patient reports that about 3 days ago he developed constant epigastric abdominal soreness along with associated nausea, vomiting, and diarrhea. The patient states that all of his symptoms seemed to occur at the same time and adds that now his abdominal soreness is radiating up underneath his bilateral rib cage and increases in severity with taking a deep breath. The patient additionally notes that his abdominal pain additionally increases in severity with eating and is associated with foul smelling belching. Currently in the ED the patient states that his nausea is worse with laying down flat and states that his last episode of vomiting was several hours ago. The patient denies any recent sick contacts, recent travel or antibiotic use or any history of abdominal surgeries. The patient does have a prescription for Adderall, but states he has not been taking this recently.     Of note, the patient states he has had similar symptoms of abdominal pain, nausea, vomiting and diarrhea in the past, though states that his current episode of this feels significantly worse than before. He states he has never been worked up for these symptoms in the past.     Allergies:  No known drug allergies.     Medications:    Adderall PO  Norco  Ibuprofen  Medrol  Zofran    Past Medical History:    ADD    Past Surgical History:    History reviewed. No pertinent surgical history.    Family History:    History reviewed. No pertinent family history.     Social History:  Smoking Status: Current Smoker  Alcohol Use: Yes  Patient presents with his wife.  Marital Status:       Review of Systems   Gastrointestinal: Positive for abdominal pain, diarrhea, nausea and vomiting.   All other systems reviewed and are negative.    Physical Exam   First  Vitals:  BP: (!) 133/93  Pulse: 92  Temp: 97.7  F (36.5  C)  Resp: 20  Weight: 106.6 kg (235 lb 0.2 oz)  SpO2: 98 %    Physical Exam  General: The patient is alert, in no respiratory distress.    HENT: Dry mucous membranes.     Cardiovascular: Regular rate and rhythm. Good pulses in all four extremities. Delayed cap refill.  Normal skin turgor.     Respiratory: Lungs are clear. No nasal flaring. No retractions. No wheezing, no crackles.    Gastrointestinal: Abdominal tenderness, worse with rectus muscle contraction. Abdomen soft. No guarding, no rebound. No palpable hernias.     Musculoskeletal: No gross deformity.     Skin: No rashes or petechiae.     Neurologic: The patient is alert and oriented x3. GCS 15. No testable cranial nerve deficit. Follows commands with clear and appropriate speech. Gives appropriate answers. Good strength in all extremities. No gross neurologic deficit. Gross sensation intact. Pupils are round and reactive. No meningismus.     Lymphatic: No cervical adenopathy. No lower extremity swelling.    Psychiatric: The patient is non-tearful.    Emergency Department Course     Imaging:  Radiographic findings were communicated with the patient who voiced understanding of the findings.    US Abdomen Limited  Nonvisualized pancreas. No cause for upper abdominal pain  demonstrated.  As read by radiology.    Laboratory:    Lipase: 253    CMP: Chloride 111 (H), o/w AWNL (Creatinine 0.77)    CBC: WBC 11.4 (H), o/w AWNL (HGB 14.6, )    Interventions:    0907: NS 1L IV Bolus  0907: Zofran 4 mg IV  0910: Morphine 4 mg IV    Emergency Department Course:  Past medical records, nursing notes, and vitals reviewed.  0847: I performed an exam of the patient and obtained history, as documented above.    IV inserted and blood drawn.    The patient was sent for an ultrasound while in the emergency department, findings above.     1034: I rechecked the patient. Findings and plan explained to the Patient.  Patient discharged home with instructions regarding supportive care, medications, and reasons to return. The importance of close follow-up was reviewed.     Impression & Plan      Medical Decision Making:  Ted Borrero is a 34 year old male who presented complaining of vomiting, diarrhea and abdominal pain. He said this has happened in the past. I did consider this could be secondary to gastroenteritis, however with his recurrent episodes and some discomfort with eating. I felt that biliary colic was a possibility. I did order an ultrasound which was negative. This does not rule him out for biliary causes. There is no signs of pancreatitis. I considered gastritis. I did not feel he likely had a bowel obstruction, appendicitis, diverticulitis and the patient was discharged home in good condition feeling improved. He agrees to follow up with a primary care doctor.     Diagnosis:    ICD-10-CM    1. Abdominal pain, epigastric R10.13    2. Vomiting and diarrhea R11.10     R19.7        Disposition:  Discharged to home.    Discharge Medications:     Medication List      Started    ondansetron 4 MG ODT tab  Commonly known as:  ZOFRAN ODT  4 mg, Oral, EVERY 8 HOURS PRN              Oxana Lacey  5/13/2019   [facility] EMERGENCY DEPARTMENT  Oxana CAMPBELL, am serving as a scribe at 8:47 AM on 5/13/2019 to document services personally performed by Fran Toribio MD based on my observations and the provider's statements to me.        Fran Toribio MD  05/13/19 0188

## 2019-05-13 NOTE — ED TRIAGE NOTES
Arrives with abdominal pain and N/V/D for the past 2 days. Alert and oriented, ABCs intact. RN quick assessment on arrival.

## 2019-05-13 NOTE — DISCHARGE INSTRUCTIONS
Discharge Instructions  Abdominal Pain    Abdominal pain can be caused by many things. Your evaluation today does not show the exact cause for your pain. Your doctor today has decided that it is unlikely your pain is due to a life threatening problem, or a problem requiring surgery or hospital admission. Sometimes those problems cannot be found right away, so it is very important that you follow up as directed.  Sometimes only the changes which occur over time allow the cause of your pain to be found.    Return to the Emergency Department for a recheck in 8-12 hours if your pain continues.  If your pain gets worse, changes in location, or feels different, return to the Emergency Department right away.    ADULTS:  Return to the Emergency Department right away if:    You get an oral temperature above 102oF or as directed by your doctor.  You have blood in your stools (bright red or black, tarry stools).  You keep throwing up or can?t drink liquids.  You see blood when you throw up.  You can?t have a bowel movement or you can?t pass gas.  Your stomach gets bloated or bigger.  Your skin or the whites of your eyes look yellow.  You faint.  You have bloody, frequent or painful urination.  You have new symptoms or anything that worries you.    CHILDREN:  Return to the Emergency Department right away if your child has any of the above-listed symptoms or the following:    Pushes your hand away or screams/cries when his/her belly is touched.  You notice your child is very fussy or weak.  Your child is very tired and is too tired to eat or drink.  Your child is dehydrated.  Signs of dehydration can be:  Your infant has had no wet diapers in 4-5 hours.  Your older child has not passed urine in 6-8 hours.  Your infant or child starts to have dry mouth and lips, or no saliva or tears.    PREGNANT WOMEN:  Return to the Emergency Department right away if you have any of the above-listed symptoms or the following:    You have  bleeding, leaking fluid or passing tissue from the vagina.  You have worse pain or cramping, or pain in your shoulder or back.  You have vomiting that will not stop.  You have painful or bloody urination.  You have a temperature of 100oF or more.  Your baby is not moving as much as usual.  You faint.  You get a bad headache with or without eye problems and abdominal pain.  You have a convulsion or seizure.  You have unusual discharge from your vagina and abdominal pain.    Abdominal pain is pretty common during pregnancy.  Your pain may or may not be related to your pregnancy. You should follow-up closely with your OB doctor so they can evaluate you and your baby.  Until you follow-up with your regular doctor, do the following:     Avoid sex and do not put anything in your vagina.  Drink clear fluids.  Only take medications approved by your doctor.    MORE INFORMATION:    Appendicitis:  A possible cause of abdominal pain in any person who still has their appendix is acute appendicitis. Appendicitis is often hard to diagnose.  Testing does not always rule out early appendicitis or other causes of abdominal pain. Close follow-up with your doctor and re-evaluations may be needed to figure out the reason for your abdominal pain.    Follow-up:  It is very important that you make an appointment with your clinic and go to the appointment.  If you do not follow-up with your primary doctor, it may result in missing an important development which could result in permanent injury or disability and/or lasting pain.  If there is any problem keeping your appointment, call your doctor or return to the Emergency Department.    Medications:  Take your medications as directed by your doctor today.  Before using over-the-counter medications, ask your doctor and make sure to take the medications as directed.  If you have any questions about medications, ask your doctor.    Diet:  Resume your normal diet as much as possible, but do not  "eat fried, fatty or spicy foods while you have pain.  Do not drink alcohol or have caffeine.  Do not smoke tobacco.    Probiotics: If you have been given an antibiotic, you may want to also take a probiotic pill or eat yogurt with live cultures. Probiotics have \"good bacteria\" to help your intestines stay healthy. Studies have shown that probiotics help prevent diarrhea and other intestine problems (including C. diff infection) when you take antibiotics. You can buy these without a prescription in the pharmacy section of the store.     If you were given a prescription for medicine here today, be sure to read all of the information (including the package insert) that comes with your prescription.  This will include important information about the medicine, its side effects, and any warnings that you need to know about.  The pharmacist who fills the prescription can provide more information and answer questions you may have about the medicine.  If you have questions or concerns that the pharmacist cannot address, please call or return to the Emergency Department.         Opioid Medication Information    Pain medications are among the most commonly prescribed medicines, so we are including this information for all our patients. If you did not receive pain medication or get a prescription for pain medicine, you can ignore it.     You may have been given a prescription for an opioid (narcotic) pain medicine and/or have received a pain medicine while here in the Emergency Department. These medicines can make you drowsy or impaired. You must not drive, operate dangerous equipment, or engage in any other dangerous activities while taking these medications. If you drive while taking these medications, you could be arrested for DUI, or driving under the influence. Do not drink any alcohol while you are taking these medications.     Opioid pain medications can cause addiction. If you have a history of chemical dependency of " any type, you are at a higher risk of becoming addicted to pain medications.  Only take these prescribed medications to treat your pain when all other options have been tried. Take it for as short a time and as few doses as possible. Store your pain pills in a secure place, as they are frequently stolen and provide a dangerous opportunity for children or visitors in your house to start abusing these powerful medications. We will not replace any lost or stolen medicine.  As soon as your pain is better, you should flush all your remaining medication.     Many prescription pain medications contain Tylenol  (acetaminophen), including Vicodin , Tylenol #3 , Norco , Lortab , and Percocet .  You should not take any extra pills of Tylenol  if you are using these prescription medications or you can get very sick.  Do not ever take more than 3000 mg of acetaminophen in any 24 hour period.    All opioids tend to cause constipation. Drink plenty of water and eat foods that have a lot of fiber, such as fruits, vegetables, prune juice, apple juice and high fiber cereal.  Take a laxative if you don?t move your bowels at least every other day. Miralax , Milk of Magnesia, Colace , or Senna  can be used to keep you regular.      Remember that you can always come back to the Emergency Department if you are not able to see your regular doctor in the amount of time listed above, if you get any new symptoms, or if there is anything that worries you.

## 2019-07-28 ENCOUNTER — HOSPITAL ENCOUNTER (OUTPATIENT)
Facility: CLINIC | Age: 34
Setting detail: OBSERVATION
Discharge: HOME | End: 2019-07-30
Attending: PHYSICIAN ASSISTANT | Admitting: INTERNAL MEDICINE
Payer: COMMERCIAL

## 2019-07-28 ENCOUNTER — APPOINTMENT (OUTPATIENT)
Dept: CT IMAGING | Facility: CLINIC | Age: 34
End: 2019-07-28
Attending: PHYSICIAN ASSISTANT
Payer: COMMERCIAL

## 2019-07-28 DIAGNOSIS — A04.72 C. DIFFICILE COLITIS: Primary | ICD-10-CM

## 2019-07-28 DIAGNOSIS — K52.9 NON-SPECIFIC COLITIS: ICD-10-CM

## 2019-07-28 LAB
ALBUMIN SERPL-MCNC: 3.6 G/DL (ref 3.4–5)
ALBUMIN UR-MCNC: 20 MG/DL
ALP SERPL-CCNC: 58 U/L (ref 40–150)
ALT SERPL W P-5'-P-CCNC: 26 U/L (ref 0–70)
ANION GAP SERPL CALCULATED.3IONS-SCNC: 4 MMOL/L (ref 3–14)
APPEARANCE UR: CLEAR
AST SERPL W P-5'-P-CCNC: 19 U/L (ref 0–45)
BASOPHILS # BLD AUTO: 0 10E9/L (ref 0–0.2)
BASOPHILS NFR BLD AUTO: 0 %
BILIRUB SERPL-MCNC: 0.5 MG/DL (ref 0.2–1.3)
BILIRUB UR QL STRIP: NEGATIVE
BUN SERPL-MCNC: 9 MG/DL (ref 7–30)
CALCIUM SERPL-MCNC: 8.5 MG/DL (ref 8.5–10.1)
CHLORIDE SERPL-SCNC: 108 MMOL/L (ref 94–109)
CO2 SERPL-SCNC: 24 MMOL/L (ref 20–32)
COLOR UR AUTO: YELLOW
CREAT SERPL-MCNC: 0.73 MG/DL (ref 0.66–1.25)
DIFFERENTIAL METHOD BLD: ABNORMAL
EOSINOPHIL # BLD AUTO: 0 10E9/L (ref 0–0.7)
EOSINOPHIL NFR BLD AUTO: 0 %
ERYTHROCYTE [DISTWIDTH] IN BLOOD BY AUTOMATED COUNT: 12.6 % (ref 10–15)
GFR SERPL CREATININE-BSD FRML MDRD: >90 ML/MIN/{1.73_M2}
GLUCOSE SERPL-MCNC: 90 MG/DL (ref 70–99)
GLUCOSE UR STRIP-MCNC: NEGATIVE MG/DL
HCT VFR BLD AUTO: 40.4 % (ref 40–53)
HGB BLD-MCNC: 12.9 G/DL (ref 13.3–17.7)
HGB UR QL STRIP: ABNORMAL
KETONES UR STRIP-MCNC: NEGATIVE MG/DL
LACTATE BLD-SCNC: 1.5 MMOL/L (ref 0.7–2)
LEUKOCYTE ESTERASE UR QL STRIP: NEGATIVE
LIPASE SERPL-CCNC: 133 U/L (ref 73–393)
LYMPHOCYTES # BLD AUTO: 1.3 10E9/L (ref 0.8–5.3)
LYMPHOCYTES NFR BLD AUTO: 9 %
MCH RBC QN AUTO: 30.8 PG (ref 26.5–33)
MCHC RBC AUTO-ENTMCNC: 31.9 G/DL (ref 31.5–36.5)
MCV RBC AUTO: 96 FL (ref 78–100)
MONOCYTES # BLD AUTO: 0.7 10E9/L (ref 0–1.3)
MONOCYTES NFR BLD AUTO: 5 %
MUCOUS THREADS #/AREA URNS LPF: PRESENT /LPF
NEUTROPHILS # BLD AUTO: 12.2 10E9/L (ref 1.6–8.3)
NEUTROPHILS NFR BLD AUTO: 86 %
NITRATE UR QL: NEGATIVE
PH UR STRIP: 6 PH (ref 5–7)
PLATELET # BLD AUTO: 269 10E9/L (ref 150–450)
PLATELET # BLD EST: ABNORMAL 10*3/UL
POTASSIUM SERPL-SCNC: 3.9 MMOL/L (ref 3.4–5.3)
PROT SERPL-MCNC: 7.2 G/DL (ref 6.8–8.8)
RBC # BLD AUTO: 4.19 10E12/L (ref 4.4–5.9)
RBC #/AREA URNS AUTO: 145 /HPF (ref 0–2)
RBC MORPH BLD: ABNORMAL
SODIUM SERPL-SCNC: 136 MMOL/L (ref 133–144)
SOURCE: ABNORMAL
SP GR UR STRIP: 1.01 (ref 1–1.03)
SQUAMOUS #/AREA URNS AUTO: 1 /HPF (ref 0–1)
UROBILINOGEN UR STRIP-MCNC: NORMAL MG/DL (ref 0–2)
WBC # BLD AUTO: 14.2 10E9/L (ref 4–11)
WBC #/AREA URNS AUTO: 2 /HPF (ref 0–5)

## 2019-07-28 PROCEDURE — 25000132 ZZH RX MED GY IP 250 OP 250 PS 637: Performed by: PHYSICIAN ASSISTANT

## 2019-07-28 PROCEDURE — 83605 ASSAY OF LACTIC ACID: CPT | Performed by: INTERNAL MEDICINE

## 2019-07-28 PROCEDURE — 96374 THER/PROPH/DIAG INJ IV PUSH: CPT

## 2019-07-28 PROCEDURE — 83690 ASSAY OF LIPASE: CPT | Performed by: PHYSICIAN ASSISTANT

## 2019-07-28 PROCEDURE — 87506 IADNA-DNA/RNA PROBE TQ 6-11: CPT | Performed by: PHYSICIAN ASSISTANT

## 2019-07-28 PROCEDURE — 87040 BLOOD CULTURE FOR BACTERIA: CPT | Mod: XS | Performed by: INTERNAL MEDICINE

## 2019-07-28 PROCEDURE — 96375 TX/PRO/DX INJ NEW DRUG ADDON: CPT

## 2019-07-28 PROCEDURE — 96376 TX/PRO/DX INJ SAME DRUG ADON: CPT

## 2019-07-28 PROCEDURE — 25000128 H RX IP 250 OP 636: Performed by: INTERNAL MEDICINE

## 2019-07-28 PROCEDURE — 25800030 ZZH RX IP 258 OP 636: Performed by: INTERNAL MEDICINE

## 2019-07-28 PROCEDURE — 25000132 ZZH RX MED GY IP 250 OP 250 PS 637: Performed by: INTERNAL MEDICINE

## 2019-07-28 PROCEDURE — 81001 URINALYSIS AUTO W/SCOPE: CPT | Performed by: PHYSICIAN ASSISTANT

## 2019-07-28 PROCEDURE — 85025 COMPLETE CBC W/AUTO DIFF WBC: CPT | Performed by: PHYSICIAN ASSISTANT

## 2019-07-28 PROCEDURE — 80053 COMPREHEN METABOLIC PANEL: CPT | Performed by: PHYSICIAN ASSISTANT

## 2019-07-28 PROCEDURE — 36415 COLL VENOUS BLD VENIPUNCTURE: CPT | Performed by: INTERNAL MEDICINE

## 2019-07-28 PROCEDURE — G0378 HOSPITAL OBSERVATION PER HR: HCPCS

## 2019-07-28 PROCEDURE — 25000128 H RX IP 250 OP 636: Performed by: PHYSICIAN ASSISTANT

## 2019-07-28 PROCEDURE — 99220 ZZC INITIAL OBSERVATION CARE,LEVL III: CPT | Performed by: INTERNAL MEDICINE

## 2019-07-28 PROCEDURE — 87493 C DIFF AMPLIFIED PROBE: CPT | Performed by: PHYSICIAN ASSISTANT

## 2019-07-28 PROCEDURE — 99285 EMERGENCY DEPT VISIT HI MDM: CPT | Mod: 25

## 2019-07-28 PROCEDURE — 74177 CT ABD & PELVIS W/CONTRAST: CPT

## 2019-07-28 PROCEDURE — 96361 HYDRATE IV INFUSION ADD-ON: CPT

## 2019-07-28 RX ORDER — PROCHLORPERAZINE 25 MG
25 SUPPOSITORY, RECTAL RECTAL EVERY 12 HOURS PRN
Status: DISCONTINUED | OUTPATIENT
Start: 2019-07-28 | End: 2019-07-30 | Stop reason: HOSPADM

## 2019-07-28 RX ORDER — ACETAMINOPHEN 325 MG/1
650 TABLET ORAL EVERY 4 HOURS PRN
Status: DISCONTINUED | OUTPATIENT
Start: 2019-07-28 | End: 2019-07-30 | Stop reason: HOSPADM

## 2019-07-28 RX ORDER — HYDROMORPHONE HYDROCHLORIDE 1 MG/ML
0.5 INJECTION, SOLUTION INTRAMUSCULAR; INTRAVENOUS; SUBCUTANEOUS
Status: COMPLETED | OUTPATIENT
Start: 2019-07-28 | End: 2019-07-28

## 2019-07-28 RX ORDER — OXYCODONE HYDROCHLORIDE 5 MG/1
5-10 TABLET ORAL
Status: DISCONTINUED | OUTPATIENT
Start: 2019-07-28 | End: 2019-07-30 | Stop reason: HOSPADM

## 2019-07-28 RX ORDER — IOPAMIDOL 755 MG/ML
100 INJECTION, SOLUTION INTRAVASCULAR ONCE
Status: COMPLETED | OUTPATIENT
Start: 2019-07-28 | End: 2019-07-28

## 2019-07-28 RX ORDER — NALOXONE HYDROCHLORIDE 0.4 MG/ML
.1-.4 INJECTION, SOLUTION INTRAMUSCULAR; INTRAVENOUS; SUBCUTANEOUS
Status: DISCONTINUED | OUTPATIENT
Start: 2019-07-28 | End: 2019-07-30 | Stop reason: HOSPADM

## 2019-07-28 RX ORDER — ONDANSETRON 2 MG/ML
4 INJECTION INTRAMUSCULAR; INTRAVENOUS ONCE
Status: COMPLETED | OUTPATIENT
Start: 2019-07-28 | End: 2019-07-28

## 2019-07-28 RX ORDER — IBUPROFEN 200 MG
400 TABLET ORAL EVERY 8 HOURS PRN
COMMUNITY
End: 2019-07-31

## 2019-07-28 RX ORDER — NICOTINE 21 MG/24HR
1 PATCH, TRANSDERMAL 24 HOURS TRANSDERMAL DAILY
Status: DISCONTINUED | OUTPATIENT
Start: 2019-07-28 | End: 2019-07-30 | Stop reason: HOSPADM

## 2019-07-28 RX ORDER — ACETAMINOPHEN 650 MG/1
650 SUPPOSITORY RECTAL EVERY 4 HOURS PRN
Status: DISCONTINUED | OUTPATIENT
Start: 2019-07-28 | End: 2019-07-30 | Stop reason: HOSPADM

## 2019-07-28 RX ORDER — HYDROMORPHONE HYDROCHLORIDE 1 MG/ML
.3-.5 INJECTION, SOLUTION INTRAMUSCULAR; INTRAVENOUS; SUBCUTANEOUS
Status: DISCONTINUED | OUTPATIENT
Start: 2019-07-28 | End: 2019-07-29

## 2019-07-28 RX ORDER — CIPROFLOXACIN 500 MG/1
500 TABLET, FILM COATED ORAL EVERY 12 HOURS SCHEDULED
Status: DISCONTINUED | OUTPATIENT
Start: 2019-07-28 | End: 2019-07-29

## 2019-07-28 RX ORDER — ONDANSETRON 2 MG/ML
4 INJECTION INTRAMUSCULAR; INTRAVENOUS EVERY 6 HOURS PRN
Status: DISCONTINUED | OUTPATIENT
Start: 2019-07-28 | End: 2019-07-30 | Stop reason: HOSPADM

## 2019-07-28 RX ORDER — PROCHLORPERAZINE MALEATE 10 MG
10 TABLET ORAL EVERY 6 HOURS PRN
Status: DISCONTINUED | OUTPATIENT
Start: 2019-07-28 | End: 2019-07-30 | Stop reason: HOSPADM

## 2019-07-28 RX ORDER — ONDANSETRON 4 MG/1
4 TABLET, ORALLY DISINTEGRATING ORAL EVERY 6 HOURS PRN
Status: DISCONTINUED | OUTPATIENT
Start: 2019-07-28 | End: 2019-07-30 | Stop reason: HOSPADM

## 2019-07-28 RX ADMIN — SODIUM CHLORIDE 1000 ML: 9 INJECTION, SOLUTION INTRAVENOUS at 14:26

## 2019-07-28 RX ADMIN — OXYCODONE HYDROCHLORIDE 10 MG: 5 TABLET ORAL at 19:24

## 2019-07-28 RX ADMIN — ACETAMINOPHEN 650 MG: 325 TABLET, FILM COATED ORAL at 21:08

## 2019-07-28 RX ADMIN — HYDROMORPHONE HYDROCHLORIDE 0.5 MG: 1 INJECTION, SOLUTION INTRAMUSCULAR; INTRAVENOUS; SUBCUTANEOUS at 14:30

## 2019-07-28 RX ADMIN — HYDROMORPHONE HYDROCHLORIDE 0.5 MG: 1 INJECTION, SOLUTION INTRAMUSCULAR; INTRAVENOUS; SUBCUTANEOUS at 14:47

## 2019-07-28 RX ADMIN — HYDROMORPHONE HYDROCHLORIDE 0.5 MG: 1 INJECTION, SOLUTION INTRAMUSCULAR; INTRAVENOUS; SUBCUTANEOUS at 17:50

## 2019-07-28 RX ADMIN — HYDROMORPHONE HYDROCHLORIDE 0.5 MG: 1 INJECTION, SOLUTION INTRAMUSCULAR; INTRAVENOUS; SUBCUTANEOUS at 20:03

## 2019-07-28 RX ADMIN — IOPAMIDOL 97 ML: 755 INJECTION, SOLUTION INTRAVENOUS at 15:00

## 2019-07-28 RX ADMIN — OXYCODONE HYDROCHLORIDE 10 MG: 5 TABLET ORAL at 22:47

## 2019-07-28 RX ADMIN — HYDROMORPHONE HYDROCHLORIDE 0.5 MG: 1 INJECTION, SOLUTION INTRAMUSCULAR; INTRAVENOUS; SUBCUTANEOUS at 16:35

## 2019-07-28 RX ADMIN — HYDROMORPHONE HYDROCHLORIDE 0.5 MG: 1 INJECTION, SOLUTION INTRAMUSCULAR; INTRAVENOUS; SUBCUTANEOUS at 23:52

## 2019-07-28 RX ADMIN — SODIUM CHLORIDE 65 ML: 9 INJECTION, SOLUTION INTRAVENOUS at 15:00

## 2019-07-28 RX ADMIN — CIPROFLOXACIN HYDROCHLORIDE 500 MG: 500 TABLET, FILM COATED ORAL at 21:09

## 2019-07-28 RX ADMIN — METRONIDAZOLE 500 MG: 500 INJECTION, SOLUTION INTRAVENOUS at 21:38

## 2019-07-28 RX ADMIN — NICOTINE 1 PATCH: 21 PATCH, EXTENDED RELEASE TRANSDERMAL at 16:42

## 2019-07-28 RX ADMIN — DEXTROSE AND SODIUM CHLORIDE: 5; 900 INJECTION, SOLUTION INTRAVENOUS at 17:41

## 2019-07-28 RX ADMIN — ONDANSETRON 4 MG: 2 INJECTION INTRAMUSCULAR; INTRAVENOUS at 14:27

## 2019-07-28 RX ADMIN — SODIUM CHLORIDE 1000 ML: 9 INJECTION, SOLUTION INTRAVENOUS at 16:33

## 2019-07-28 ASSESSMENT — ENCOUNTER SYMPTOMS
BLOOD IN STOOL: 0
ABDOMINAL PAIN: 1
DYSURIA: 0
VOMITING: 0
NAUSEA: 1
DIARRHEA: 1
HEMATURIA: 0

## 2019-07-28 ASSESSMENT — MIFFLIN-ST. JEOR: SCORE: 1938.29

## 2019-07-28 NOTE — ED NOTES
RECEIVING UNIT ED HANDOFF REVIEW    Above ED Nurse Handoff Report was reviewed: Yes  Reviewed by: Jeannette Reyes on July 28, 2019 at 4:43 PM       [facility]  ED Nurse Handoff Report    Ted Borrero is a 34 year old male   ED Chief complaint: Abdominal Pain  . ED Diagnosis:   Final diagnoses:   Non-specific colitis     Allergies: No Known Allergies    Code Status: Full Code  Activity level - Baseline/Home:  Independent. Activity Level - Current:   Independent. Lift room needed: No. Bariatric: No   Needed: No   Isolation: Yes. Infection: Not Applicable  MRSA.     Vital Signs:   Vitals:    07/28/19 1445 07/28/19 1515 07/28/19 1530 07/28/19 1545   BP:  156/69 118/75 123/81   Pulse:  106 103 105   Resp:       Temp:       TempSrc:       SpO2: 93% 95% 98% 95%   Weight:       Height:           Cardiac Rhythm:  ,      Pain level: 0-10 Pain Scale: 4  Patient confused: No. Patient Falls Risk: No.   Elimination Status: has not voided  Patient Report - Initial Complaint: abd pain. Focused Assessment:  34 year old male who presents to the emergency department today for evaluation of abdominal pain. The patient reports he developed severe, RUQ abdominal pain that radiates to the right of his back. He reports he has episodes lasting 40 seconds where the pain worsens. He reports he is having episodes of diarrhea multiple times per day, but has no blood in the stool. He says the diarrhea occurred before his abdominal pain. He is able to still hold down fluids and some food. Due to these worsening symptoms he presented to the emergency department today. He says he has had similar pain before. He endorses nausea. He denies vomiting, hematuria, and dysuria.            Allergies:  No Known Drug Allergies         Medications:    Adderall  Norco  Medrol        Past Medical History:    ADD  Depression        Past Surgical History:    History reviewed. No pertinent past surgical history.         Family  "History:    ADHD  Hypertension        Social History:  The patient was accompanied to the ED by significant other.  Smoking Status: Current Every Day Smoker  Smokeless Tobacco: Never Used  Alcohol Use: Yes   Marital Status:  Single [1]        Review of Systems   Gastrointestinal: Positive for abdominal pain, diarrhea and nausea. Negative for blood in stool and vomiting.   Genitourinary: Negative for dysuria and hematuria.   All other systems reviewed and are negative.           Physical Exam   First Vitals:  BP: 127/84  Pulse: 115  Temp: 98.1  F (36.7  C)  Resp: 18  Height: 175.3 cm (5' 9\")  Weight: 99.8 kg (220 lb)  SpO2: 98 %       Tests Performed:   Labs Ordered and Resulted from Time of ED Arrival Up to the Time of Departure from the ED   CBC WITH PLATELETS DIFFERENTIAL - Abnormal; Notable for the following components:       Result Value    WBC 14.2 (*)     RBC Count 4.19 (*)     Hemoglobin 12.9 (*)     Absolute Neutrophil 12.2 (*)     All other components within normal limits   COMPREHENSIVE METABOLIC PANEL   LIPASE   ROUTINE UA WITH MICROSCOPIC     CT Abdomen Pelvis w Contrast   Final Result   IMPRESSION:   1. Moderate to severe colitis involving the right colon. Some mild   wall thickening of the remainder of the colon may also represent   colitis. Small right pericolic gutter fluid and adjacent edema. A few   enlarged lymph nodes are seen in the mesentery and may be reactive.   The appendix does show some wall thickening, but this is felt to be   most likely related to the colitis.   2. No abscess or free air.   3. Nonobstructing stone within the right kidney.      DOREEN GUALLPA MD          . Abnormal Results: WBC 14.2, CT shows moderate to severe colitis in right colon.   Treatments provided:   Medications   HYDROmorphone (PF) (DILAUDID) injection 0.5 mg (0.5 mg Intravenous Given 7/28/19 2417)   0.9% sodium chloride BOLUS (has no administration in time range)   0.9% sodium chloride BOLUS (0 mLs Intravenous " Stopped 7/28/19 1530)   ondansetron (ZOFRAN) injection 4 mg (4 mg Intravenous Given 7/28/19 1427)   iopamidol (ISOVUE-370) solution 100 mL (97 mLs Intravenous Given 7/28/19 1500)   0.9% sodium chloride BOLUS (0 mLs Intravenous Stopped 7/28/19 1501)       Family Comments: wife and son here  OBS brochure/video discussed/provided to patient:  Yes  ED Medications:   Medications   HYDROmorphone (PF) (DILAUDID) injection 0.5 mg (0.5 mg Intravenous Given 7/28/19 1447)   0.9% sodium chloride BOLUS (has no administration in time range)   0.9% sodium chloride BOLUS (0 mLs Intravenous Stopped 7/28/19 1530)   ondansetron (ZOFRAN) injection 4 mg (4 mg Intravenous Given 7/28/19 1427)   iopamidol (ISOVUE-370) solution 100 mL (97 mLs Intravenous Given 7/28/19 1500)   0.9% sodium chloride BOLUS (0 mLs Intravenous Stopped 7/28/19 1501)     Drips infusing:  No  For the majority of the shift, the patient's behavior Green. Interventions performed were NA.     Severe Sepsis OR Septic Shock Diagnosis Present: No      ED Nurse Name/Phone Number: MIRANDA VALDES,   4:17 PM

## 2019-07-28 NOTE — ED TRIAGE NOTES
Patient presents with complaints of abdomen pain that goes into his back. He states that his skin is painful to touch in those areas. Patient states he is also having diarrhea as well. Symptoms started on Saturday. ABC intact without need for intervention at this time.

## 2019-07-28 NOTE — ED PROVIDER NOTES
"  History     Chief Complaint:  Abdominal Pain      HPI   Ted Borrero is a 34 year old male with history of depression and ADD who presents to the emergency department today for evaluation of abdominal pain. The patient reports he developed severe, RUQ abdominal pain that radiates to the right of his back. He reports he has episodes lasting 40 seconds where the pain worsens. He reports he is having episodes of diarrhea multiple times per day, but has no blood in the stool. He says the diarrhea occurred before his abdominal pain. He is able to still hold down fluids and some food. Due to these worsening symptoms he presented to the emergency department today. He says he has had similar pain before. He endorses nausea. He denies vomiting, hematuria, and dysuria.       Allergies:  No Known Drug Allergies        Medications:    Adderall  Norco  Medrol      Past Medical History:    ADD  Depression      Past Surgical History:    History reviewed. No pertinent past surgical history.       Family History:    ADHD  Hypertension      Social History:  The patient was accompanied to the ED by significant other.  Smoking Status: Current Every Day Smoker  Smokeless Tobacco: Never Used  Alcohol Use: Yes   Marital Status:  Single [1]       Review of Systems   Gastrointestinal: Positive for abdominal pain, diarrhea and nausea. Negative for blood in stool and vomiting.   Genitourinary: Negative for dysuria and hematuria.   All other systems reviewed and are negative.      Physical Exam   First Vitals:  BP: 127/84  Pulse: 115  Temp: 98.1  F (36.7  C)  Resp: 18  Height: 175.3 cm (5' 9\")  Weight: 99.8 kg (220 lb)  SpO2: 98 %      Physical Exam  Constitutional: uncomfortable appearing  Head: No external signs of trauma noted to head or face.   Eyes: Pupils are equal, round, and reactive to light. Conjunctiva normal. No scleral icterus.   ENT: MMM. Oropharynx clear and moist. Normal voice.   Neck: normal ROM.  Cardiovascular: " tachycardia, regular rhythm, and intact distal pulses.    Respiratory: Effort normal. No respiratory distress. Lungs clear to auscultation bilaterally.   GI: Soft. Diffuse tenderness to palpation, worse in the RLQ. No rebound or guarding.   Musculoskeletal: No deformities appreciated. Normal ROM. No edema noted.  Neurological: Alert and Oriented x 3. Speech normal. Moves all extremities equally.  Psychiatric: Appropriate mood, affect, and behavior.   Skin: Skin is warm and dry.     Emergency Department Course     Imaging:  Radiology findings were communicated with the patient who voiced understanding of the findings.    CT Abdomen/Pelvis w Contrast:  IMPRESSION:  1. Moderate to severe colitis involving the right colon. Some mild  wall thickening of the remainder of the colon may also represent  colitis. Small right pericolic gutter fluid and adjacent edema. A few  enlarged lymph nodes are seen in the mesentery and may be reactive.  The appendix does show some wall thickening, but this is felt to be  most likely related to the colitis.  2. No abscess or free air.  3. Nonobstructing stone within the right kidney.  Report per radiology       Laboratory:  Laboratory findings were communicated with the patient who voiced understanding of the findings.    CBC: WBC 14.2 (H), HGB 12.9 (L),    CMP: WNL. (Creatinine 0.73)   Lipase: 133     UA: Pending      Interventions:  1426 NS Bolus 1,000mL IV   1427 Zofran 4mg IV   1447 Hydromorphone 0.5 mg IV       Emergency Department Course:  Nursing notes and vitals reviewed.  1423: I performed an exam of the patient as documented above.    IV was inserted and blood was drawn for laboratory testing, results above.    The patient was sent for a CT Abdomen Pelvis while in the emergency department, results above.     The patient provided a urine sample here in the emergency department. This was sent for laboratory testing, findings above.     1600 Patient rechecked and updated.       1624 I spoke with Dr. Rajan of the Hospitalist service regarding patient's presentation, findings, and plan of care.     1631 I spoke with Dr. Ruiz of the General Surgery service  regarding patient's presentation, findings, and plan of care.     I discussed the treatment plan with the patient. They expressed understanding of this plan and consented to admission. I discussed the patient with Dr. Rajan, who will admit the patient to a monitored bed for further evaluation and treatment.   I personally reviewed the laboratory and imaging results with the Patient and answered all related questions prior to admission.     Impression & Plan      Medical Decision Makin year old male presenting with diffuse abdominal pain, diarrhea, and nausea. Differential diagnosis considered includes but is not limited to renal colic, appendicitis, pyelonephritis, diverticulitis, colitis, obstruction, perforation, among others. Afebrile on arrival with diffuse tenderness, but no rebound or guarding. His labs are notable for leukocytosis of 14.2. Otherwise unremarkable. Given the amount of discomfort he was in, a CT abd/pelvis with contrast was obtained. This showed moderate to severe colitis. There is some thickening of the wall of the appendix, but felt to be related to the colitis. I did discuss this with general surgery, who agreed this is just related to the colitis and is not appendicitis. The cause of the colitis is not entirely clear at this time. Patient is requiring multiple doses of IV pain medications to control his pain and therefore he will be admitted to the observation unit for continued symptom control. Will likely need to see GI at some point, but not necessarily during this hospitalization.        Diagnosis:    ICD-10-CM    1. Non-specific colitis K52.9        Disposition:  Admitted under the supervision of Dr. Rajan.     Scribe Disclosure:  Jenny CAMPBELL, am serving as a scribe at 3:57 PM on 2019  to document services personally performed by Astrid Anderson PA-C based on my observations and the provider's statements to me.    Jenny Marks  7/28/2019   [facility] EMERGENCY DEPARTMENT       Astrid Anderson PA-C  07/28/19 9504

## 2019-07-28 NOTE — PROGRESS NOTES
ROOM # 230    Living Situation (if not independent, order SW consult): at home with wife in [place]    : wifeTaryn [phone]    Activity level at baseline: ind  Activity level on admit: ind      Patient registered to observation; given Patient Bill of Rights; given the opportunity to ask questions about observation status and their plan of care.  Patient has been oriented to the observation room, bathroom and call light is in place.    Discussed discharge goals and expectations with patient/family.

## 2019-07-29 LAB
ANION GAP SERPL CALCULATED.3IONS-SCNC: 2 MMOL/L (ref 3–14)
BASOPHILS # BLD AUTO: 0.1 10E9/L (ref 0–0.2)
BASOPHILS NFR BLD AUTO: 1 %
BUN SERPL-MCNC: 7 MG/DL (ref 7–30)
C COLI+JEJUNI+LARI FUSA STL QL NAA+PROBE: NOT DETECTED
C DIFF TOX B STL QL: POSITIVE
CALCIUM SERPL-MCNC: 7.8 MG/DL (ref 8.5–10.1)
CHLORIDE SERPL-SCNC: 110 MMOL/L (ref 94–109)
CO2 SERPL-SCNC: 25 MMOL/L (ref 20–32)
CREAT SERPL-MCNC: 0.73 MG/DL (ref 0.66–1.25)
CRP SERPL-MCNC: 103 MG/L (ref 0–8)
DIFFERENTIAL METHOD BLD: ABNORMAL
EC STX1 GENE STL QL NAA+PROBE: NOT DETECTED
EC STX2 GENE STL QL NAA+PROBE: NOT DETECTED
ENTERIC PATHOGEN COMMENT: ABNORMAL
EOSINOPHIL # BLD AUTO: 0 10E9/L (ref 0–0.7)
EOSINOPHIL NFR BLD AUTO: 0 %
ERYTHROCYTE [DISTWIDTH] IN BLOOD BY AUTOMATED COUNT: 12.6 % (ref 10–15)
ERYTHROCYTE [SEDIMENTATION RATE] IN BLOOD BY WESTERGREN METHOD: 13 MM/H (ref 0–15)
GFR SERPL CREATININE-BSD FRML MDRD: >90 ML/MIN/{1.73_M2}
GLUCOSE SERPL-MCNC: 97 MG/DL (ref 70–99)
HCT VFR BLD AUTO: 35.8 % (ref 40–53)
HGB BLD-MCNC: 11.4 G/DL (ref 13.3–17.7)
LYMPHOCYTES # BLD AUTO: 2.1 10E9/L (ref 0.8–5.3)
LYMPHOCYTES NFR BLD AUTO: 22 %
MCH RBC QN AUTO: 31.6 PG (ref 26.5–33)
MCHC RBC AUTO-ENTMCNC: 31.8 G/DL (ref 31.5–36.5)
MCV RBC AUTO: 99 FL (ref 78–100)
MONOCYTES # BLD AUTO: 0.8 10E9/L (ref 0–1.3)
MONOCYTES NFR BLD AUTO: 8 %
NEUTROPHILS # BLD AUTO: 6.7 10E9/L (ref 1.6–8.3)
NEUTROPHILS NFR BLD AUTO: 69 %
NOROV GI+II ORF1-ORF2 JNC STL QL NAA+PR: NOT DETECTED
PLATELET # BLD AUTO: 226 10E9/L (ref 150–450)
PLATELET # BLD EST: ABNORMAL 10*3/UL
POTASSIUM SERPL-SCNC: 3.7 MMOL/L (ref 3.4–5.3)
RBC # BLD AUTO: 3.61 10E12/L (ref 4.4–5.9)
RBC MORPH BLD: ABNORMAL
RVA NSP5 STL QL NAA+PROBE: NOT DETECTED
SALMONELLA SP RPOD STL QL NAA+PROBE: ABNORMAL
SHIGELLA SP+EIEC IPAH STL QL NAA+PROBE: NOT DETECTED
SODIUM SERPL-SCNC: 137 MMOL/L (ref 133–144)
SPECIMEN SOURCE: ABNORMAL
V CHOL+PARA RFBL+TRKH+TNAA STL QL NAA+PR: NOT DETECTED
WBC # BLD AUTO: 9.7 10E9/L (ref 4–11)
Y ENTERO RECN STL QL NAA+PROBE: NOT DETECTED

## 2019-07-29 PROCEDURE — 86140 C-REACTIVE PROTEIN: CPT | Performed by: INTERNAL MEDICINE

## 2019-07-29 PROCEDURE — G0378 HOSPITAL OBSERVATION PER HR: HCPCS

## 2019-07-29 PROCEDURE — 85025 COMPLETE CBC W/AUTO DIFF WBC: CPT | Performed by: INTERNAL MEDICINE

## 2019-07-29 PROCEDURE — 96376 TX/PRO/DX INJ SAME DRUG ADON: CPT

## 2019-07-29 PROCEDURE — 96361 HYDRATE IV INFUSION ADD-ON: CPT

## 2019-07-29 PROCEDURE — 99225 ZZC SUBSEQUENT OBSERVATION CARE,LEVEL II: CPT | Performed by: PHYSICIAN ASSISTANT

## 2019-07-29 PROCEDURE — 25000128 H RX IP 250 OP 636: Performed by: PHYSICIAN ASSISTANT

## 2019-07-29 PROCEDURE — 25000132 ZZH RX MED GY IP 250 OP 250 PS 637: Performed by: INTERNAL MEDICINE

## 2019-07-29 PROCEDURE — 25000128 H RX IP 250 OP 636: Performed by: INTERNAL MEDICINE

## 2019-07-29 PROCEDURE — 85652 RBC SED RATE AUTOMATED: CPT | Performed by: INTERNAL MEDICINE

## 2019-07-29 PROCEDURE — 99207 ZZC CDG-MDM COMPONENT: MEETS LOW - DOWN CODED: CPT | Performed by: PHYSICIAN ASSISTANT

## 2019-07-29 PROCEDURE — 96375 TX/PRO/DX INJ NEW DRUG ADDON: CPT

## 2019-07-29 PROCEDURE — 36415 COLL VENOUS BLD VENIPUNCTURE: CPT | Performed by: INTERNAL MEDICINE

## 2019-07-29 PROCEDURE — 25800030 ZZH RX IP 258 OP 636: Performed by: PHYSICIAN ASSISTANT

## 2019-07-29 PROCEDURE — 80048 BASIC METABOLIC PNL TOTAL CA: CPT | Performed by: INTERNAL MEDICINE

## 2019-07-29 PROCEDURE — 25800030 ZZH RX IP 258 OP 636: Performed by: INTERNAL MEDICINE

## 2019-07-29 RX ORDER — MORPHINE SULFATE 2 MG/ML
2-4 INJECTION, SOLUTION INTRAMUSCULAR; INTRAVENOUS
Status: DISCONTINUED | OUTPATIENT
Start: 2019-07-29 | End: 2019-07-30 | Stop reason: HOSPADM

## 2019-07-29 RX ORDER — SODIUM CHLORIDE, SODIUM LACTATE, POTASSIUM CHLORIDE, CALCIUM CHLORIDE 600; 310; 30; 20 MG/100ML; MG/100ML; MG/100ML; MG/100ML
INJECTION, SOLUTION INTRAVENOUS CONTINUOUS
Status: DISCONTINUED | OUTPATIENT
Start: 2019-07-29 | End: 2019-07-30 | Stop reason: HOSPADM

## 2019-07-29 RX ORDER — VANCOMYCIN HYDROCHLORIDE 50 MG/ML
125 KIT ORAL 4 TIMES DAILY
Status: DISCONTINUED | OUTPATIENT
Start: 2019-07-29 | End: 2019-07-30 | Stop reason: HOSPADM

## 2019-07-29 RX ADMIN — MORPHINE SULFATE 4 MG: 2 INJECTION, SOLUTION INTRAMUSCULAR; INTRAVENOUS at 23:45

## 2019-07-29 RX ADMIN — ACETAMINOPHEN 650 MG: 325 TABLET, FILM COATED ORAL at 04:40

## 2019-07-29 RX ADMIN — OXYCODONE HYDROCHLORIDE 10 MG: 5 TABLET ORAL at 08:29

## 2019-07-29 RX ADMIN — HYDROMORPHONE HYDROCHLORIDE 0.5 MG: 1 INJECTION, SOLUTION INTRAMUSCULAR; INTRAVENOUS; SUBCUTANEOUS at 04:40

## 2019-07-29 RX ADMIN — VANCOMYCIN HYDROCHLORIDE 125 MG: KIT at 20:09

## 2019-07-29 RX ADMIN — SODIUM CHLORIDE, POTASSIUM CHLORIDE, SODIUM LACTATE AND CALCIUM CHLORIDE: 600; 310; 30; 20 INJECTION, SOLUTION INTRAVENOUS at 23:45

## 2019-07-29 RX ADMIN — VANCOMYCIN HYDROCHLORIDE 125 MG: KIT at 15:35

## 2019-07-29 RX ADMIN — OXYCODONE HYDROCHLORIDE 10 MG: 5 TABLET ORAL at 11:46

## 2019-07-29 RX ADMIN — MORPHINE SULFATE 4 MG: 2 INJECTION, SOLUTION INTRAMUSCULAR; INTRAVENOUS at 20:05

## 2019-07-29 RX ADMIN — VANCOMYCIN HYDROCHLORIDE 125 MG: KIT at 11:40

## 2019-07-29 RX ADMIN — OXYCODONE HYDROCHLORIDE 10 MG: 5 TABLET ORAL at 17:07

## 2019-07-29 RX ADMIN — MORPHINE SULFATE 4 MG: 2 INJECTION, SOLUTION INTRAMUSCULAR; INTRAVENOUS at 13:15

## 2019-07-29 RX ADMIN — VANCOMYCIN HYDROCHLORIDE 125 MG: KIT at 02:17

## 2019-07-29 RX ADMIN — OXYCODONE HYDROCHLORIDE 10 MG: 5 TABLET ORAL at 03:06

## 2019-07-29 RX ADMIN — MORPHINE SULFATE 4 MG: 2 INJECTION, SOLUTION INTRAMUSCULAR; INTRAVENOUS at 15:35

## 2019-07-29 RX ADMIN — OXYCODONE HYDROCHLORIDE 10 MG: 5 TABLET ORAL at 20:59

## 2019-07-29 RX ADMIN — NICOTINE 1 PATCH: 21 PATCH, EXTENDED RELEASE TRANSDERMAL at 08:29

## 2019-07-29 RX ADMIN — HYDROMORPHONE HYDROCHLORIDE 0.5 MG: 1 INJECTION, SOLUTION INTRAMUSCULAR; INTRAVENOUS; SUBCUTANEOUS at 09:55

## 2019-07-29 RX ADMIN — VANCOMYCIN HYDROCHLORIDE 125 MG: KIT at 08:29

## 2019-07-29 RX ADMIN — DEXTROSE AND SODIUM CHLORIDE: 5; 900 INJECTION, SOLUTION INTRAVENOUS at 04:40

## 2019-07-29 RX ADMIN — SODIUM CHLORIDE, POTASSIUM CHLORIDE, SODIUM LACTATE AND CALCIUM CHLORIDE: 600; 310; 30; 20 INJECTION, SOLUTION INTRAVENOUS at 13:09

## 2019-07-29 RX ADMIN — MORPHINE SULFATE 2 MG: 2 INJECTION, SOLUTION INTRAMUSCULAR; INTRAVENOUS at 18:05

## 2019-07-29 NOTE — H&P
Admitted:     07/28/2019      CHIEF COMPLAINT:  Abdominal pain and diarrhea.      HISTORY OF PRESENT ILLNESS:  Ted Borrero is a 34-year-old gentleman with past medical history significant for attention deficit disorder, depression, history of MRSA wound infection who presents to the emergency room today for evaluation of abdominal pain.  The patient was relatively at his baseline state of health until 4 days ago when he started to have diarrhea.  The diarrhea was watery, about 4-5 times a day, no bllood in it.  Two days ago, the patient started to have abdominal discomfort, yesterday he had severe pain in the right lower quadrant that radiated to the right back.  The pain is aching and crampy in nature, and worse with episodes of diarrhea.  The patient denied any tenesmus, no fever, no chills, no nausea or vomiting.  He denied any change in his weight or appetite.  The patient denied eating unusual or uncooked food or travel prior to the onset of the symptom.  The patient also stated he had similar symptoms in the past, but it was not so severe as this one.     In the emergency room, he was evaluated, blood work showed leukocytosis.  The patient has chronically elevated white count.  Electrolytes, liver function tests were normal.  CT scan showed right side colitis with some suspicious wall thickening of the appendix.  I discussed with ED physician assistant, Astrid Anderson, and requested to call the surgeon on-call to discuss about the CT findings related to the appendix. She stated she discussed with  Dr. Powers who stated it is is more of colitis than appendicitis.      PAST MEDICAL HISTORY:   1.  ADHD.   2.  Depression.   3.  Hypertension, not on medication.   4.  Obesity.   5.  MRSA wound infection.      PAST SURGICAL HISTORY:  None.      SOCIAL HISTORY:  He is in the emergency room with his significant other.  He smokes every day.  He occasionally drinks alcohol.  He does not use illicit drugs.      REVIEW  OF SYSTEMS:  Ten points reviewed, all are negative except those mentioned in history of present illness.      FAMILY HISTORY:  Brother with ADHD, mother with hypertension and no other family medical conditions known to the patient.      ALLERGIES:  NO KNOWN DRUG ALLERGIES.      PHYSICAL EXAMINATION:   GENERAL:  The patient is awake, alert, oriented, in mild pain distress.   VITAL SIGNS:  Blood pressure 130/80, pulse rate 100, temperature 98.1, oxygen saturation 95%.   HEENT:  Pink, nonicteric.  Extraocular muscle movement intact.   NECK:  Supple, no JVD, no thyromegaly.   CHEST:  Good air entry bilaterally.  No wheezing, crackles or rales.   CARDIOVASCULAR:  S1, S2 regular, no gallop or murmur.   ABDOMEN:  Obese, soft, positive tenderness on the right upper and lower quadrants, no guarding, no rebound.   EXTREMITIES:  No edema, cyanosis or clubbing.   NEUROLOGIC:  No focal neurologic deficit.  Cranial nerves grossly intact.   PSYCHIATRIC:  Normal mood and affect, keeps eye contact, responds to question appropriately.   NEUROLOGIC:  No focal neurologic deficits.  Cranial nerves II-XII grossly intact.  Moves all his extremities.      DIAGNOSTIC TESTS OF INTEREST:  CT abdomen and pelvis showed moderate to severe colitis involving the right colon, some mild wall thickening of the colon is seen, absence of free air, no enlarging lymph nodes noted.  Appendix shows wall thickening, but felt to be due to colitis.  Complete metabolic panel all within normal limits.  Lipase 133.  WBC 14.2, hemoglobin 12.9, platelets 269, neutrophils 86%.      ASSESSMENT:  Ted Fairbanks is a 34-year-old gentleman with history of ADHD, depression, hypertension, MRSA wound infection who came in today with diarrhea for the last 4 days, abdominal pain of 2 days' duration, which got worse over the last 24 hours, and found to have severe colitis of the right colon.  The patient is being admitted to the hospital for further evaluation and  management.      EMERGENCY ROOM COURSE:  The patient was given IV bolus normal saline 100 mL and Zofran and hydromorphone.      1.  Severe right-sided colitis.  Etiology is not clear.  It could be infectious versus inflammatory conditions.  At this point, we did not start him on IV antibiotics.  We will get stool for Clostridium difficile and enteric panel, requested ED provider to order it.  The patient will be on enteric precautions until stool test comes back.  In the meantime, we will start him on clear liquid diet, advance slowly to full if he tolerates it. GI will be consulted to help sort out this. If there is no infectious cause identified, he will likely need colonoscopy when the colitis subsides. as an outpatient.  Surgery already reviewed the patient's imaging study and has discussed with the ED physician that this is not appendicitis.   2.  ADD.  The patient is on Adderall prior to admission.  Needs to be reviewed by pharmacy prior to restarting it.   3.  Tobacco dependence.  The patient will be on replacement protocol, need for tobacco cessation counseling.   4.  Obesity with body mass index of 32.     5.  Right nephrolithiasis, non obstruction- incidental finding  6.  Microscopic haematuria, likely due to stone.     I discussed with the patient at length the plan of care, also with his significant other.  All his questions and concerns addressed.  I also discussed with the ED provider.  The patient will be admitted under observation.         YOSELIN MORALES MD             D: 2019   T: 2019   MT: DONAL      Name:     TOYA VALENCIA   MRN:      [id number]        Account:      [id number]   :      1985        Admitted:     2019                   Document: [id number]

## 2019-07-29 NOTE — PLAN OF CARE
"PRIMARY DIAGNOSIS: colitis    OUTPATIENT/OBSERVATION GOALS TO BE MET BEFORE DISCHARGE  1. Orthostatic performed: No    2. Tolerating PO fluid and/or antibiotics (if applicable): Yes, tolerating clear liquid diet. PO vanco given for positive cdiff    3. Nausea/Vomiting/Diarrhea symptoms improved: No,  a few loose stools and watery    4. Pain status: pt reporting constant abdominal pain. More tender in RUQ on palpation, however pt reports bilateral upper and lower abd pain. PRN oxycodone and IV dilaudid given for pain interventions.     5. Return to near baseline physical activity: Yes, up ad alberto    Discharge Planner Nurse   Safe discharge environment identified: Yes  Barriers to discharge: No       Entered by: Jeannette Reyes 07/29/2019 8:41 AM     Please review provider order for any additional goals.   Nurse to notify provider when observation goals have been met and patient is ready for discharge.    /74 (BP Location: Right arm)   Pulse 81   Temp 98.3  F (36.8  C) (Oral)   Resp 24   Ht 1.753 m (5' 9\")   Wt 99.8 kg (220 lb)   SpO2 97%   BMI 32.49 kg/m      Orientation: A&Ox4  Neuro: WNL  Pain status: abd pain 8/10, sharp, constant. PRN oxycodone 10mg given  Activity: up ad alberto  Peripheral neurovascular: WNL  Resp: WNL  Lungs: clear  Cardiac: WNL  GI: frequent loose stool, vanessa in color. Denies nausea  : WNL  Skin: in tact  IVF: D5+NS 100mL/hr  Meds: PO vanco, PRN oxy, and nicotine patch (R deltoid) given  Labs/imaging: cdiff positive, enteric panel pending  Diet: clear liquids  Consults: GI  Plan: pain control, monitor BMs, PO vanco, IVF, GI to consult  "

## 2019-07-29 NOTE — PLAN OF CARE
"PRIMARY DIAGNOSIS: colitis    OUTPATIENT/OBSERVATION GOALS TO BE MET BEFORE DISCHARGE  1. Orthostatic performed: No    2. Tolerating PO fluid and/or antibiotics (if applicable): Yes, tolerating clear liquid diet. PO vanco given for positive cdiff    3. Nausea/Vomiting/Diarrhea symptoms improved: No,  a few loose stools and watery    4. Pain status: continues to report constant abdominal pain, 8-10/10. More tender in RUQ on palpation, however pt reports bilateral upper and lower abd pain. PRN oxycodone and dilaudid given. Dilaudid just discontinued and changed to PRN morphine    5. Return to near baseline physical activity: Yes, up ad alberto    Discharge Planner Nurse   Safe discharge environment identified: Yes  Barriers to discharge: No       Entered by: Jeannette Reyes 07/29/2019 12:17 PM     Please review provider order for any additional goals.   Nurse to notify provider when observation goals have been met and patient is ready for discharge.    /77 (BP Location: Right arm)   Pulse 86   Temp 98.3  F (36.8  C) (Oral)   Resp 24   Ht 1.753 m (5' 9\")   Wt 99.8 kg (220 lb)   SpO2 93%   BMI 32.49 kg/m      Pt lying in bed, continues to report severe abd pain, PRN pain meds given. Pt tends to sleep for a short while and wake in severe pain. Pt noted to be continuously moaning in bed when awake. Wife at bedside.  "

## 2019-07-29 NOTE — PHARMACY-ADMISSION MEDICATION HISTORY
Admission medication history interview status for this patient is complete. See [facility] admission navigator for allergy information, prior to admission medications and immunization status.     Medication history interview source(s):Patient  Medication history resources (including written lists, pill bottles, clinic record): Care Everywhere, no new records of prescription medications  Primary pharmacy: [facility]    Changes made to PTA medication list:  Added: None  Deleted: Adderall, hydrocodone-APAP, methylprednisolone, and ondansetron  Changed: Ibuprofen 600mg --> 400 mg    Actions taken by pharmacist (provider contacted, etc):None     Additional medication history information: Patient does not take any prescription or OTC medications besides ibuprofen.    Medication reconciliation/reorder completed by provider prior to medication history? Yes    Do you take OTC medications (eg tylenol, ibuprofen, fish oil, eye/ear drops, etc)? Yes, see list below.      Prior to Admission medications    Medication Sig Last Dose Taking? Auth Provider   ibuprofen (ADVIL/MOTRIN) 200 MG tablet Take 400 mg by mouth every 8 hours as needed for mild pain 7/28/2019 at am Yes Unknown, Entered By History

## 2019-07-29 NOTE — PLAN OF CARE
PRIMARY DIAGNOSIS: COLITIS      OUTPATIENT/OBSERVATION GOALS TO BE MET BEFORE DISCHARGE  1. Orthostatic performed: N/A     2. Tolerating PO fluid and/or antibiotics (if applicable): Yes     3. Nausea/Vomiting/Diarrhea symptoms improved: Yes     4. Pain status: Improved but still requiring IV narcotics.     5. Return to near baseline physical activity: Yes     Discharge Planner Nurse   Safe discharge environment identified: Yes  Barriers to discharge: Yes- pain , G-I will see in AM.         VSS- temp on VS check was 98.8. Pt is still endorsing pain . PRN oxy/dilaudid given with some relief. Stool sample sent for c-diff and enterica panel.    Entered by: Erick Salter 07/29/2019 12:56 AM  Please review provider order for any additional goals.   Nurse to notify provider when observation goals have been met and patient is ready for discharge.

## 2019-07-29 NOTE — PROGRESS NOTES
X-cover    Called for positive C. difficile.  Chart reviewed patient admitted with colitis and fever.  Had empirically been placed on antibiotics.  Stop antibiotics start oral vancomycin 125 mg every 4 hours stat.

## 2019-07-29 NOTE — PLAN OF CARE
PRIMARY DIAGNOSIS: COLITIS      OUTPATIENT/OBSERVATION GOALS TO BE MET BEFORE DISCHARGE  1. Orthostatic performed: N/A     2. Tolerating PO fluid and/or antibiotics (if applicable): Yes     3. Nausea/Vomiting/Diarrhea symptoms improved: Yes     4. Pain status: Improved but still requiring IV narcotics.     5. Return to near baseline physical activity: Yes     Discharge Planner Nurse   Safe discharge environment identified: Yes  Barriers to discharge: Yes- pain , G-I will see in AM.     VSS- temp on VS check was 98.8. Pt is still endorsing pain . PRN oxy/dilaudid given with some relief. Stool sample sent for c-diff and enterica panel. C-diff resulted and pt started on vanco.      Entered by: Erick Salter 07/29/2019 3:27 AM  Please review provider order for any additional goals.   Nurse to notify provider when observation goals have been met and patient is ready for discharge.

## 2019-07-29 NOTE — PROVIDER NOTIFICATION
MD notified of having fever and appeared to have chills/sweaty and general appearance of looking sick. See MD orders.

## 2019-07-29 NOTE — PROGRESS NOTES
"[facility]  Hospitalist Progress Note  Adali Sanchez PA-C 07/29/2019    Reason for Stay (Diagnosis): C diff colitis         Assessment and Plan:      Summary of Stay: Ted Borrero is a 34 year old male admitted on 7/28/2019 with abdominal cramping and diarrhea. He was febrile to 102 with WBC of 14.2 and normal lactic acid. CT imaging showed moderate to severe colitis involving the right colon. C diff study positive and started on oral Vancomycin. Continues to have severe cramping pain in the abdomen but no fever or elevation in WBC today.    ADDENDUM: Stool also positive for Salmonella. Blood cultures negative from yesterday. Curbsided ID who recommends no treatments of Salmonella and repeat stool studies 2-3 weeks after discharge      Problem List:   1. C diff colitis- Developed diarrhea and abdominal cramping on 7/24. CT showed moderate to severe colitis with C diff test positive. Started oral Vancomycin on 7/29 and has been using both IV and oral narcotics for pain control. No blood in stool. Abdominal pain severe but no distension, rigidity or peritoneal signs.  -Continue oral vancomycin  -Morphine IV and oxycodone available for pain  -Clear liquid diet  -Change fluids to lactated ringers at 100 ml/hr  -Low threshold for repeat CT scan given exquisitey of pain    2. ADHD  -Continue PTA adderall    3. Tobacco dependence  -Nicotine patch ordered  -Needs tobacco cessation counseling          DVT Prophylaxis: Low Risk/Ambulatory with no VTE prophylaxis indicated  Code Status: Full Code  Discharge Dispo: Possibly tomorrow          Interval History (Subjective):      Reports severe abdominal pain with some loose stools. No nausea or vomiting.                   Physical Exam:      Last Vital Signs:  /77 (BP Location: Right arm)   Pulse 86   Temp 98.3  F (36.8  C) (Oral)   Resp 24   Ht 1.753 m (5' 9\")   Wt 99.8 kg (220 lb)   SpO2 93%   BMI 32.49 kg/m      No intake or output data " in the 24 hours ending 07/29/19 1240    Constitutional: Awake, alert, cooperative, no apparent distress   Respiratory: Clear to auscultation bilaterally, no crackles or wheezing   Cardiovascular: Regular rate and rhythm, normal S1 and S2, and no murmur noted   Abdomen: Normal bowel sounds, soft, non-distended, diffusely tender to palpation. No peritoneal signs   Skin: No rashes, no cyanosis, dry to touch   Neuro: Alert and oriented x3, no weakness, numbness, memory loss   Extremities: No edema, normal range of motion   Other(s):        All other systems: Negative          Medications:      All current medications were reviewed with changes reflected in problem list.         Data:      All new lab and imaging data was reviewed.   Labs:  Recent Labs   Lab 07/29/19  0556      POTASSIUM 3.7   CHLORIDE 110*   CO2 25   ANIONGAP 2*   GLC 97   BUN 7   CR 0.73   GFRESTIMATED >90   GFRESTBLACK >90   CHERYL 7.8*     Recent Labs   Lab 07/29/19  0556   WBC 9.7   HGB 11.4*   HCT 35.8*   MCV 99         Imaging:   Recent Results (from the past 24 hour(s))   CT Abdomen Pelvis w Contrast    Narrative    CT ABDOMEN AND PELVIS WITH CONTRAST   7/28/2019 3:06 PM     HISTORY: Severe diffuse abdominal pain, worse on the right.    TECHNIQUE:  CT abdomen and pelvis with 97 mL isovue-370 IV. Radiation  dose for this scan was reduced using automated exposure control,  adjustment of the mA and/or kV according to patient size, or iterative  reconstruction technique.    COMPARISON: None.    FINDINGS:   There is severe inflammatory change of the cecum and right colon with  wall thickening. There is adjacent right pericolic small free fluid  with surrounding edema. There are several enlarged lymph nodes at the  right lower quadrant mesentery with an example measuring 1.2 x 0.9 cm  series 2 image 48. The appendix shows a mild degree of wall thickening  but is likely secondary to the inflamed right colon. A few bubbles of  gas are seen  within the appendix. There is no definable abscess. No  bowel obstruction. The remainder of the colon shows just a mild degree  of wall prominence. Small free pelvic fluid.    The liver, gallbladder, adrenals, spleen, pancreas, and kidneys do not  show any acute abnormalities. Nonobstructing stone within the right  kidney is 0.3 cm image 34. No hydronephrosis or ureter stone.      Impression    IMPRESSION:  1. Moderate to severe colitis involving the right colon. Some mild  wall thickening of the remainder of the colon may also represent  colitis. Small right pericolic gutter fluid and adjacent edema. A few  enlarged lymph nodes are seen in the mesentery and may be reactive.  The appendix does show some wall thickening, but this is felt to be  most likely related to the colitis.  2. No abscess or free air.  3. Nonobstructing stone within the right kidney.    DOREEN GUALLPA MD

## 2019-07-29 NOTE — PROGRESS NOTES
Infection Prevention:    Patient requires Enteric precautions because of Cdiff. Patient also has MRSA hx: wound, 2018. Please contact Infection Prevention with any questions/concerns at *[phone].    Brigid Lowe, ICP

## 2019-07-29 NOTE — PLAN OF CARE
PRIMARY DIAGNOSIS: COLITIS     OUTPATIENT/OBSERVATION GOALS TO BE MET BEFORE DISCHARGE  1. Orthostatic performed: N/A    2. Tolerating PO fluid and/or antibiotics (if applicable): Yes    3. Nausea/Vomiting/Diarrhea symptoms improved: Yes    4. Pain status: Improved but still requiring IV narcotics.    5. Return to near baseline physical activity: Yes    Discharge Planner Nurse   Safe discharge environment identified: Yes  Barriers to discharge: Yes- pain , G-I will see in AM.       Entered by: Erick Salter 07/28/2019 7:30 PM     Please review provider order for any additional goals.   Nurse to notify provider when observation goals have been met and patient is ready for discharge.

## 2019-07-29 NOTE — PROGRESS NOTES
Patient developed fever and chills after admission to observation unit. He is admitted with colitis. Stool studies not collected yet as no more BM. LA 1.5.  We will get BC x2 now, noted BC from 4 days ago so far negative.  Given the fever and chilling sensation, will start on Cipro and flagyl.  Continue IVF

## 2019-07-29 NOTE — PLAN OF CARE
"PRIMARY DIAGNOSIS: Colitis    OUTPATIENT/OBSERVATION GOALS TO BE MET BEFORE DISCHARGE  1. Orthostatic performed: No    2. Tolerating PO fluid and/or antibiotics (if applicable): Yes    3. Nausea/Vomiting/Diarrhea symptoms improved: No,  runny and loose stools     4. Pain status: no, IV pain meds    5. Return to near baseline physical activity: Yes    Discharge Planner Nurse   Safe discharge environment identified: Yes  Barriers to discharge: Yes       Entered by: Cam Ortega 07/29/2019     Pt alert and oriented x4. He has pain 8/10 in abdomen, sharp pain and cramping. Given IV morphine. Up independent. Clear liquid diet. Encouraged fluids. Oral vanco given. Enteric precautions. LR @ 100 ml/hr. Will cont supportive cares.     BP (!) 142/110 (BP Location: Right arm)   Pulse 93   Temp 98.7  F (37.1  C) (Oral)   Resp 22   Ht 1.753 m (5' 9\")   Wt 99.8 kg (220 lb)   SpO2 98%   BMI 32.49 kg/m         Please review provider order for any additional goals.   Nurse to notify provider when observation goals have been met and patient is ready for discharge.  "

## 2019-07-30 VITALS
RESPIRATION RATE: 16 BRPM | BODY MASS INDEX: 32.58 KG/M2 | HEIGHT: 69 IN | SYSTOLIC BLOOD PRESSURE: 139 MMHG | OXYGEN SATURATION: 100 % | WEIGHT: 220 LBS | TEMPERATURE: 99.1 F | DIASTOLIC BLOOD PRESSURE: 87 MMHG | HEART RATE: 94 BPM

## 2019-07-30 LAB
ANION GAP SERPL CALCULATED.3IONS-SCNC: 5 MMOL/L (ref 3–14)
BASOPHILS # BLD AUTO: 0 10E9/L (ref 0–0.2)
BASOPHILS NFR BLD AUTO: 0 %
BUN SERPL-MCNC: 6 MG/DL (ref 7–30)
CALCIUM SERPL-MCNC: 8.1 MG/DL (ref 8.5–10.1)
CHLORIDE SERPL-SCNC: 111 MMOL/L (ref 94–109)
CO2 SERPL-SCNC: 25 MMOL/L (ref 20–32)
CREAT SERPL-MCNC: 0.94 MG/DL (ref 0.66–1.25)
DIFFERENTIAL METHOD BLD: ABNORMAL
EOSINOPHIL # BLD AUTO: 0 10E9/L (ref 0–0.7)
EOSINOPHIL NFR BLD AUTO: 0 %
ERYTHROCYTE [DISTWIDTH] IN BLOOD BY AUTOMATED COUNT: 12.5 % (ref 10–15)
GFR SERPL CREATININE-BSD FRML MDRD: >90 ML/MIN/{1.73_M2}
GLUCOSE SERPL-MCNC: 101 MG/DL (ref 70–99)
HCT VFR BLD AUTO: 34.3 % (ref 40–53)
HGB BLD-MCNC: 10.7 G/DL (ref 13.3–17.7)
LYMPHOCYTES # BLD AUTO: 2.7 10E9/L (ref 0.8–5.3)
LYMPHOCYTES NFR BLD AUTO: 26 %
MCH RBC QN AUTO: 30.5 PG (ref 26.5–33)
MCHC RBC AUTO-ENTMCNC: 31.2 G/DL (ref 31.5–36.5)
MCV RBC AUTO: 98 FL (ref 78–100)
MONOCYTES # BLD AUTO: 0.6 10E9/L (ref 0–1.3)
MONOCYTES NFR BLD AUTO: 6 %
NEUTROPHILS # BLD AUTO: 7 10E9/L (ref 1.6–8.3)
NEUTROPHILS NFR BLD AUTO: 68 %
PLATELET # BLD AUTO: 230 10E9/L (ref 150–450)
PLATELET # BLD EST: ABNORMAL 10*3/UL
POTASSIUM SERPL-SCNC: 3.7 MMOL/L (ref 3.4–5.3)
RBC # BLD AUTO: 3.51 10E12/L (ref 4.4–5.9)
RBC MORPH BLD: ABNORMAL
SODIUM SERPL-SCNC: 141 MMOL/L (ref 133–144)
WBC # BLD AUTO: 10.3 10E9/L (ref 4–11)

## 2019-07-30 PROCEDURE — 25000128 H RX IP 250 OP 636: Performed by: PHYSICIAN ASSISTANT

## 2019-07-30 PROCEDURE — 85025 COMPLETE CBC W/AUTO DIFF WBC: CPT | Performed by: PHYSICIAN ASSISTANT

## 2019-07-30 PROCEDURE — 25000132 ZZH RX MED GY IP 250 OP 250 PS 637: Performed by: INTERNAL MEDICINE

## 2019-07-30 PROCEDURE — 99217 ZZC OBSERVATION CARE DISCHARGE: CPT | Performed by: PHYSICIAN ASSISTANT

## 2019-07-30 PROCEDURE — 96376 TX/PRO/DX INJ SAME DRUG ADON: CPT

## 2019-07-30 PROCEDURE — 36415 COLL VENOUS BLD VENIPUNCTURE: CPT | Performed by: PHYSICIAN ASSISTANT

## 2019-07-30 PROCEDURE — 96361 HYDRATE IV INFUSION ADD-ON: CPT

## 2019-07-30 PROCEDURE — 25800030 ZZH RX IP 258 OP 636: Performed by: PHYSICIAN ASSISTANT

## 2019-07-30 PROCEDURE — G0378 HOSPITAL OBSERVATION PER HR: HCPCS

## 2019-07-30 PROCEDURE — 80048 BASIC METABOLIC PNL TOTAL CA: CPT | Performed by: PHYSICIAN ASSISTANT

## 2019-07-30 RX ORDER — OXYCODONE HYDROCHLORIDE 5 MG/1
5-10 TABLET ORAL EVERY 6 HOURS PRN
Qty: 10 TABLET | Refills: 0 | Status: SHIPPED | OUTPATIENT
Start: 2019-07-30 | End: 2020-11-13

## 2019-07-30 RX ORDER — ONDANSETRON 4 MG/1
4 TABLET, FILM COATED ORAL EVERY 6 HOURS PRN
Qty: 10 TABLET | Refills: 0 | Status: SHIPPED | OUTPATIENT
Start: 2019-07-30 | End: 2020-11-13

## 2019-07-30 RX ORDER — OXYCODONE HYDROCHLORIDE 5 MG/1
5-10 TABLET ORAL EVERY 6 HOURS PRN
Qty: 10 TABLET | Refills: 0 | Status: SHIPPED | OUTPATIENT
Start: 2019-07-30 | End: 2019-07-30

## 2019-07-30 RX ORDER — VANCOMYCIN HYDROCHLORIDE 125 MG/1
125 CAPSULE ORAL 4 TIMES DAILY
Qty: 33 CAPSULE | Refills: 0 | Status: SHIPPED | OUTPATIENT
Start: 2019-07-30 | End: 2020-11-13

## 2019-07-30 RX ADMIN — VANCOMYCIN HYDROCHLORIDE 125 MG: KIT at 11:07

## 2019-07-30 RX ADMIN — MORPHINE SULFATE 4 MG: 2 INJECTION, SOLUTION INTRAMUSCULAR; INTRAVENOUS at 03:18

## 2019-07-30 RX ADMIN — OXYCODONE HYDROCHLORIDE 5 MG: 5 TABLET ORAL at 11:07

## 2019-07-30 RX ADMIN — SODIUM CHLORIDE, POTASSIUM CHLORIDE, SODIUM LACTATE AND CALCIUM CHLORIDE: 600; 310; 30; 20 INJECTION, SOLUTION INTRAVENOUS at 08:06

## 2019-07-30 RX ADMIN — OXYCODONE HYDROCHLORIDE 10 MG: 5 TABLET ORAL at 01:02

## 2019-07-30 RX ADMIN — OXYCODONE HYDROCHLORIDE 10 MG: 5 TABLET ORAL at 05:29

## 2019-07-30 RX ADMIN — VANCOMYCIN HYDROCHLORIDE 125 MG: KIT at 08:06

## 2019-07-30 RX ADMIN — VANCOMYCIN HYDROCHLORIDE 125 MG: KIT at 14:36

## 2019-07-30 RX ADMIN — OXYCODONE HYDROCHLORIDE 10 MG: 5 TABLET ORAL at 08:11

## 2019-07-30 NOTE — PLAN OF CARE
PRIMARY DIAGNOSIS: ACUTE ABDOMINAL PAIN/COLITIS   OUTPATIENT/OBSERVATION GOALS TO BE MET BEFORE DISCHARGE:  1. Pain Status: Improved but still requiring IV narcotics, PO narcotics    2. Return to near baseline physical activity: Yes    3. Cleared for discharge by consultants (if involved): N/A    Discharge Planner Nurse   Safe discharge environment identified: Yes  Barriers to discharge: Yes- pain management, Oral vanco.       Entered by: Erick Salter 07/30/2019 1:20 AM     Please review provider order for any additional goals.   Nurse to notify provider when observation goals have been met and patient is ready for discharge.

## 2019-07-30 NOTE — PLAN OF CARE
"PRIMARY DIAGNOSIS: colitis    OUTPATIENT/OBSERVATION GOALS TO BE MET BEFORE DISCHARGE  1. Orthostatic performed: No    2. Tolerating PO fluid and/or antibiotics (if applicable): Yes, tolerating clear liquids. PO vanco given    3. Nausea/Vomiting/Diarrhea symptoms improved: Yes, pt reporting loose stool; however, last stool was last night    4. Pain status: reporting mild to moderate abd cramping pain 5/10. States much better than yesterday. PRN oxycodone given     5. Return to near baseline physical activity: Yes, up ad alberto    Discharge Planner Nurse   Safe discharge environment identified: Yes  Barriers to discharge: No       Entered by: Jeannette Reyes 07/30/2019 8:13 AM     Please review provider order for any additional goals.   Nurse to notify provider when observation goals have been met and patient is ready for discharge.    /78 (BP Location: Right arm)   Pulse 65   Temp 99  F (37.2  C) (Oral)   Resp 16   Ht 1.753 m (5' 9\")   Wt 99.8 kg (220 lb)   SpO2 96%   BMI 32.49 kg/m      Orientation: A&Ox4  Neuro: WNL  Pain status: 5/10 cramping abd pain, reports today his pain is more localized to RUQ, but entire abd remains tender on palpation  Activity: up ad alberto  Peripheral neurovascular: WNL  Resp: WNL  Lungs: clear  Cardiac: WNL  GI: hyperactive bowel sounds, last loose stool was yesterday, denies nausea  : WNL  Skin: in tact  IVF: LR 100mL/hr  Meds: PO  vanco, PRN pain meds  Labs/imaging: positive for cdiff and salmonella  Diet: clear liquids  Plan: continue PO vanco, IVF, pain control, enteric and contact precautions for cdiff, salmonella, and hx of MRSA. Pt has low grade temp, PO fluids encouraged.  "

## 2019-07-30 NOTE — DISCHARGE SUMMARY
Discharge Summary  Hospitalist Service    Ted Borrero MRN# [id number]   YOB: 1985 Age: 34 year old     Date of Admission:  7/28/2019  Date of Discharge:  7/30/2019  Admitting Physician: Sánchez Rajan MD  Discharge Physician: Adali Sanchez PA-C  Discharging Service: Hospitalist Service     Primary Provider: Michoacano Gaytan  Primary Care Physician Phone Number: [phone]         Discharge Diagnoses/Problem Oriented Hospital Course (Providers):    Summary of Stay: Ted Borrero is a 34 year old male admitted on 7/28/2019 with abdominal cramping and diarrhea. He was febrile to 102 with WBC of 14.2 and normal lactic acid. CT imaging showed moderate to severe colitis involving the right colon. C diff study positive and started on oral Vancomycin. Stool became positive for Salmonella later, blood cultures negative. Cramping improved today and tolerating oral intake.        Problem List:   1. C diff colitis- Developed diarrhea and abdominal cramping on 7/24. CT showed moderate to severe colitis with C diff test positive. Started oral Vancomycin on 7/29 and has been using both IV and oral narcotics for pain control. No blood in stool. Abdominal pain severe but no distension, rigidity or peritoneal signs.  -Continue oral vancomycin for 8 more days  -Recommend good hang hygiene and to avoid immunocompromised, elderly and neonates until diarrhea/fever gone for 24/48 hours.  -Advance diet as tolerated  -Return to work Thursday or Friday    2. Salmonella gastroenteritis  Blood cultures negative. Spoke with ID and recommend no treatment. Health dept called and informed of infection.     3. ADHD  -Continue PTA adderall     4. Tobacco dependence  -Recommend smoking cessation         Code Status:      Full Code        Brief Hospital Stay Summary Sent Home With Patient in AVS:        Reason for your hospital stay      You were admitted for concerns of stomach cramping, diarrhea and  fever.   We tested your stool and this was positive or C difficile and Salmonella.   We recommend treatment with Vancomycin and you must complete the course as   prescribed. For nausea you may use Zofran and for pain you may use   Tylenol, Ibuprofen and Oxycodone (use sparingly).    You should not prepare food for others and have limited contact with ill   or immunocompromised people, infants and elderly people while symptomatic   (recommend waiting 24-48 hours after diarrhea or fever stops).    Please wash your hands frequently. Hand  does not kill C diff   bacteria.                           Pending Results:        Unresulted Labs Ordered in the Past 30 Days of this Admission     Date and Time Order Name Status Description    7/28/2019 2055 Blood culture Preliminary     7/28/2019 2055 Blood culture Preliminary             Discharge Instructions and Follow-Up:      Follow-up Appointments     Follow-up and recommended labs and tests       Follow up with primary care provider, Michoacano Gaytan, within 7-14   days to evaluate medication change and for hospital follow- up.  No follow   up labs or test are needed. Consider retesting stool               Discharge Disposition:      Discharged to home         Discharge Medications:        Current Discharge Medication List      START taking these medications    Details   ondansetron (ZOFRAN) 4 MG tablet Take 1 tablet (4 mg) by mouth every 6 hours as needed for nausea  Qty: 10 tablet, Refills: 0    Associated Diagnoses: C. difficile colitis      oxyCODONE (ROXICODONE) 5 MG tablet Take 1-2 tablets (5-10 mg) by mouth every 6 hours as needed for moderate to severe pain  Qty: 10 tablet, Refills: 0    Associated Diagnoses: C. difficile colitis      vancomycin (VANCOCIN) 125 MG capsule Take 1 capsule (125 mg) by mouth 4 times daily  Qty: 33 capsule, Refills: 0    Associated Diagnoses: C. difficile colitis         CONTINUE these medications which have NOT CHANGED     "Details   ibuprofen (ADVIL/MOTRIN) 200 MG tablet Take 400 mg by mouth every 8 hours as needed for mild pain               Allergies:       No Known Allergies        Consultations This Hospital Stay:      No consultations were requested during this admission         Condition and Physical on Discharge:      Discharge condition: Stable   Vitals: Blood pressure 129/75, pulse 87, temperature 98.6  F (37  C), temperature source Oral, resp. rate 16, height 1.753 m (5' 9\"), weight 99.8 kg (220 lb), SpO2 97 %.     Constitutional: Alert and orientated.   Lungs: CTAB   Cardiovascular: RRR with no murmur   Abdomen: No abdomen distension. Bowel sounds are hypoactive with diffuse tenderness that is mproved from yesterday.   Skin: No rash or open sores   Other:          Discharge Time:      Less than 30 minutes.        Image Results From This Hospital Stay (For Non-EPIC Providers):        Results for orders placed or performed during the hospital encounter of 07/28/19   CT Abdomen Pelvis w Contrast    Narrative    CT ABDOMEN AND PELVIS WITH CONTRAST   7/28/2019 3:06 PM     HISTORY: Severe diffuse abdominal pain, worse on the right.    TECHNIQUE:  CT abdomen and pelvis with 97 mL isovue-370 IV. Radiation  dose for this scan was reduced using automated exposure control,  adjustment of the mA and/or kV according to patient size, or iterative  reconstruction technique.    COMPARISON: None.    FINDINGS:   There is severe inflammatory change of the cecum and right colon with  wall thickening. There is adjacent right pericolic small free fluid  with surrounding edema. There are several enlarged lymph nodes at the  right lower quadrant mesentery with an example measuring 1.2 x 0.9 cm  series 2 image 48. The appendix shows a mild degree of wall thickening  but is likely secondary to the inflamed right colon. A few bubbles of  gas are seen within the appendix. There is no definable abscess. No  bowel obstruction. The remainder of the colon " shows just a mild degree  of wall prominence. Small free pelvic fluid.    The liver, gallbladder, adrenals, spleen, pancreas, and kidneys do not  show any acute abnormalities. Nonobstructing stone within the right  kidney is 0.3 cm image 34. No hydronephrosis or ureter stone.      Impression    IMPRESSION:  1. Moderate to severe colitis involving the right colon. Some mild  wall thickening of the remainder of the colon may also represent  colitis. Small right pericolic gutter fluid and adjacent edema. A few  enlarged lymph nodes are seen in the mesentery and may be reactive.  The appendix does show some wall thickening, but this is felt to be  most likely related to the colitis.  2. No abscess or free air.  3. Nonobstructing stone within the right kidney.    DOREEN GUALLPA MD           Most Recent Lab Results In EPIC (For Non-EPIC Providers):    Most Recent 3 CBC's:  Recent Labs   Lab Test 07/30/19  0559 07/29/19  0556 07/28/19  1413   WBC 10.3 9.7 14.2*   HGB 10.7* 11.4* 12.9*   MCV 98 99 96    226 269      Most Recent 3 BMP's:  Recent Labs   Lab Test 07/30/19  0559 07/29/19  0556 07/28/19  1413    137 136   POTASSIUM 3.7 3.7 3.9   CHLORIDE 111* 110* 108   CO2 25 25 24   BUN 6* 7 9   CR 0.94 0.73 0.73   ANIONGAP 5 2* 4   CHERYL 8.1* 7.8* 8.5   * 97 90     Most Recent 3 Troponin's:No lab results found.  Most Recent 3 INR's:No lab results found.  Most Recent 2 LFT's:  Recent Labs   Lab Test 07/28/19  1413 05/13/19  0856   AST 19 25   ALT 26 33   ALKPHOS 58 67   BILITOTAL 0.5 0.3     Most Recent Cholesterol Panel:No lab results found.  Most Recent 6 Bacteria Isolates From Any Culture (See EPIC Reports for Culture Details):  Recent Labs   Lab Test 07/28/19  2132 07/28/19  2121 09/24/18  0840 09/24/18  0838 09/23/18  2040 09/23/18  2030   CULT No growth after 1 day No growth after 1 day No growth No growth No growth No growth     Most Recent TSH, T4 and HgbA1c: No lab results found.

## 2019-07-30 NOTE — PLAN OF CARE
Patient's After Visit Summary was reviewed with patient.  Patient verbalized understanding of After Visit Summary, recommended follow up and was given an opportunity to ask questions.   Discharge medications sent home with patient/family: YES, zofran, oxy, and vanco  Discharged with spouse        OBSERVATION patient END time: 1500

## 2019-07-30 NOTE — PLAN OF CARE
"PRIMARY DIAGNOSIS: Colitis     OUTPATIENT/OBSERVATION GOALS TO BE MET BEFORE DISCHARGE  1. Orthostatic performed: No     2. Tolerating PO fluid and/or antibiotics (if applicable): Yes     3. Nausea/Vomiting/Diarrhea symptoms improved: No,  runny and loose stools      4. Pain status: no, IV pain meds     5. Return to near baseline physical activity: Yes     Discharge Planner Nurse   Safe discharge environment identified: Yes  Barriers to discharge: Yes       Entered by: Cam Ortega 07/29/2019      Pt alert and oriented x4. He has pain 8/10 in abdomen, sharp pain and cramping. Given IV morphine q 2hr, oxycodone q3hr. Up independent. Clear liquid diet. Encouraged fluids. Oral vanco given. Enteric precautions. LR @ 100 ml/hr. Will cont supportive cares.      /88 (BP Location: Right arm)   Pulse 71   Temp 98.9  F (37.2  C) (Oral)   Resp 20   Ht 1.753 m (5' 9\")   Wt 99.8 kg (220 lb)   SpO2 95%   BMI 32.49 kg/m      Please review provider order for any additional goals.   Nurse to notify provider when observation goals have been met and patient is ready for discharge.              "

## 2019-07-30 NOTE — PLAN OF CARE
"PRIMARY DIAGNOSIS: colitis    OUTPATIENT/OBSERVATION GOALS TO BE MET BEFORE DISCHARGE  1. Orthostatic performed: No    2. Tolerating PO fluid and/or antibiotics (if applicable): Yes, tolerating clear liquids. PO vanco given. Diet advanced to regular, will monitor how pt tolerates.    3. Nausea/Vomiting/Diarrhea symptoms improved: Yes, no stools today, last loose stool yesterday    4. Pain status: reporting mild to moderate abd cramping pain 4/10. States much better than yesterday. PRN oxycodone given     5. Return to near baseline physical activity: Yes, up ad alberto    Discharge Planner Nurse   Safe discharge environment identified: Yes  Barriers to discharge: No       Entered by: Jeannette Reyes 07/30/2019 11:12 AM     Please review provider order for any additional goals.   Nurse to notify provider when observation goals have been met and patient is ready for discharge.    /75 (BP Location: Right arm)   Pulse 87   Temp 98.6  F (37  C) (Oral)   Resp 16   Ht 1.753 m (5' 9\")   Wt 99.8 kg (220 lb)   SpO2 97%   BMI 32.49 kg/m      Pt sitting in bed, reporting mild abd pain, tolerating clear liquids, diet advanced to regular. Plan to continue IVF, pain control, and PO vanco.  "

## 2019-07-30 NOTE — PLAN OF CARE
PRIMARY DIAGNOSIS: ACUTE ABDOMINAL PAIN/COLITIS   OUTPATIENT/OBSERVATION GOALS TO BE MET BEFORE DISCHARGE:  1. Pain Status: Improved but still requiring IV narcotics, PO narcotics     2. Return to near baseline physical activity: Yes     3. Cleared for discharge by consultants (if involved): N/A     Discharge Planner Nurse   Safe discharge environment identified: Yes  Barriers to discharge: Yes- pain management, Oral vanco.         No acute events overnight. Pt is still having severe abdominal cramping on/off. PRN morphine/oxy given. No nausea. Clear liquid diet. LR @ 100. Up  ind in room.     Entered by: Erick Salter 07/30/2019 4:10 AM  Please review provider order for any additional goals.   Nurse to notify provider when observation goals have been met and patient is ready for discharge.

## 2019-07-31 ENCOUNTER — HOSPITAL ENCOUNTER (EMERGENCY)
Facility: CLINIC | Age: 34
Discharge: HOME | End: 2019-07-31
Attending: EMERGENCY MEDICINE | Admitting: EMERGENCY MEDICINE
Payer: COMMERCIAL

## 2019-07-31 ENCOUNTER — APPOINTMENT (OUTPATIENT)
Dept: CT IMAGING | Facility: CLINIC | Age: 34
End: 2019-07-31
Attending: EMERGENCY MEDICINE
Payer: COMMERCIAL

## 2019-07-31 VITALS
SYSTOLIC BLOOD PRESSURE: 132 MMHG | HEIGHT: 69 IN | OXYGEN SATURATION: 97 % | RESPIRATION RATE: 18 BRPM | BODY MASS INDEX: 32.49 KG/M2 | HEART RATE: 96 BPM | DIASTOLIC BLOOD PRESSURE: 87 MMHG | TEMPERATURE: 98.3 F

## 2019-07-31 DIAGNOSIS — N20.1 URETEROLITHIASIS: ICD-10-CM

## 2019-07-31 LAB
ALBUMIN SERPL-MCNC: 3 G/DL (ref 3.4–5)
ALBUMIN UR-MCNC: 10 MG/DL
ALP SERPL-CCNC: 53 U/L (ref 40–150)
ALT SERPL W P-5'-P-CCNC: 18 U/L (ref 0–70)
ANION GAP SERPL CALCULATED.3IONS-SCNC: 8 MMOL/L (ref 3–14)
APPEARANCE UR: CLEAR
AST SERPL W P-5'-P-CCNC: 18 U/L (ref 0–45)
BASOPHILS # BLD AUTO: 0 10E9/L (ref 0–0.2)
BASOPHILS NFR BLD AUTO: 0.3 %
BILIRUB SERPL-MCNC: 0.7 MG/DL (ref 0.2–1.3)
BILIRUB UR QL STRIP: NEGATIVE
BUN SERPL-MCNC: 6 MG/DL (ref 7–30)
CALCIUM SERPL-MCNC: 8.6 MG/DL (ref 8.5–10.1)
CHLORIDE SERPL-SCNC: 104 MMOL/L (ref 94–109)
CO2 SERPL-SCNC: 28 MMOL/L (ref 20–32)
COLOR UR AUTO: YELLOW
CREAT SERPL-MCNC: 1.07 MG/DL (ref 0.66–1.25)
DIFFERENTIAL METHOD BLD: ABNORMAL
EOSINOPHIL # BLD AUTO: 0 10E9/L (ref 0–0.7)
EOSINOPHIL NFR BLD AUTO: 0.1 %
ERYTHROCYTE [DISTWIDTH] IN BLOOD BY AUTOMATED COUNT: 12.5 % (ref 10–15)
GFR SERPL CREATININE-BSD FRML MDRD: >90 ML/MIN/{1.73_M2}
GLUCOSE SERPL-MCNC: 105 MG/DL (ref 70–99)
GLUCOSE UR STRIP-MCNC: NEGATIVE MG/DL
HCT VFR BLD AUTO: 31.5 % (ref 40–53)
HGB BLD-MCNC: 10.3 G/DL (ref 13.3–17.7)
HGB UR QL STRIP: ABNORMAL
IMM GRANULOCYTES # BLD: 0.1 10E9/L (ref 0–0.4)
IMM GRANULOCYTES NFR BLD: 0.6 %
KETONES UR STRIP-MCNC: NEGATIVE MG/DL
LEUKOCYTE ESTERASE UR QL STRIP: NEGATIVE
LYMPHOCYTES # BLD AUTO: 2.4 10E9/L (ref 0.8–5.3)
LYMPHOCYTES NFR BLD AUTO: 16.9 %
MCH RBC QN AUTO: 31.1 PG (ref 26.5–33)
MCHC RBC AUTO-ENTMCNC: 32.7 G/DL (ref 31.5–36.5)
MCV RBC AUTO: 95 FL (ref 78–100)
MONOCYTES # BLD AUTO: 2.2 10E9/L (ref 0–1.3)
MONOCYTES NFR BLD AUTO: 15.6 %
MUCOUS THREADS #/AREA URNS LPF: PRESENT /LPF
NEUTROPHILS # BLD AUTO: 9.5 10E9/L (ref 1.6–8.3)
NEUTROPHILS NFR BLD AUTO: 66.5 %
NITRATE UR QL: NEGATIVE
NRBC # BLD AUTO: 0 10*3/UL
NRBC BLD AUTO-RTO: 0 /100
PH UR STRIP: 6.5 PH (ref 5–7)
PLATELET # BLD AUTO: 259 10E9/L (ref 150–450)
POTASSIUM SERPL-SCNC: 3.4 MMOL/L (ref 3.4–5.3)
PROT SERPL-MCNC: 6.7 G/DL (ref 6.8–8.8)
RBC # BLD AUTO: 3.31 10E12/L (ref 4.4–5.9)
RBC #/AREA URNS AUTO: 92 /HPF (ref 0–2)
SODIUM SERPL-SCNC: 140 MMOL/L (ref 133–144)
SOURCE: ABNORMAL
SP GR UR STRIP: 1.02 (ref 1–1.03)
UROBILINOGEN UR STRIP-MCNC: NORMAL MG/DL (ref 0–2)
WBC # BLD AUTO: 14.3 10E9/L (ref 4–11)
WBC #/AREA URNS AUTO: 4 /HPF (ref 0–5)

## 2019-07-31 PROCEDURE — 25000128 H RX IP 250 OP 636: Performed by: EMERGENCY MEDICINE

## 2019-07-31 PROCEDURE — 96375 TX/PRO/DX INJ NEW DRUG ADDON: CPT

## 2019-07-31 PROCEDURE — 85025 COMPLETE CBC W/AUTO DIFF WBC: CPT | Performed by: EMERGENCY MEDICINE

## 2019-07-31 PROCEDURE — 36415 COLL VENOUS BLD VENIPUNCTURE: CPT | Performed by: EMERGENCY MEDICINE

## 2019-07-31 PROCEDURE — 96374 THER/PROPH/DIAG INJ IV PUSH: CPT | Mod: 59

## 2019-07-31 PROCEDURE — 99285 EMERGENCY DEPT VISIT HI MDM: CPT | Mod: 25

## 2019-07-31 PROCEDURE — 74177 CT ABD & PELVIS W/CONTRAST: CPT

## 2019-07-31 PROCEDURE — 96361 HYDRATE IV INFUSION ADD-ON: CPT

## 2019-07-31 PROCEDURE — 81001 URINALYSIS AUTO W/SCOPE: CPT | Performed by: EMERGENCY MEDICINE

## 2019-07-31 PROCEDURE — 80053 COMPREHEN METABOLIC PANEL: CPT | Performed by: EMERGENCY MEDICINE

## 2019-07-31 PROCEDURE — 96376 TX/PRO/DX INJ SAME DRUG ADON: CPT

## 2019-07-31 RX ORDER — TAMSULOSIN HYDROCHLORIDE 0.4 MG/1
0.4 CAPSULE ORAL DAILY
Qty: 10 CAPSULE | Refills: 0 | Status: SHIPPED | OUTPATIENT
Start: 2019-07-31 | End: 2019-08-10

## 2019-07-31 RX ORDER — ONDANSETRON 2 MG/ML
4 INJECTION INTRAMUSCULAR; INTRAVENOUS EVERY 30 MIN PRN
Status: DISCONTINUED | OUTPATIENT
Start: 2019-07-31 | End: 2019-07-31 | Stop reason: HOSPADM

## 2019-07-31 RX ORDER — IBUPROFEN 200 MG
600 TABLET ORAL EVERY 6 HOURS PRN
Qty: 30 TABLET | Refills: 0 | Status: SHIPPED | OUTPATIENT
Start: 2019-07-31 | End: 2020-11-13

## 2019-07-31 RX ORDER — SODIUM CHLORIDE 9 MG/ML
INJECTION, SOLUTION INTRAVENOUS CONTINUOUS
Status: DISCONTINUED | OUTPATIENT
Start: 2019-07-31 | End: 2019-07-31 | Stop reason: HOSPADM

## 2019-07-31 RX ORDER — OXYCODONE AND ACETAMINOPHEN 5; 325 MG/1; MG/1
1-2 TABLET ORAL EVERY 6 HOURS PRN
Qty: 12 TABLET | Refills: 0 | Status: SHIPPED | OUTPATIENT
Start: 2019-07-31 | End: 2020-11-13

## 2019-07-31 RX ORDER — HYDROMORPHONE HYDROCHLORIDE 1 MG/ML
0.5 INJECTION, SOLUTION INTRAMUSCULAR; INTRAVENOUS; SUBCUTANEOUS
Status: DISCONTINUED | OUTPATIENT
Start: 2019-07-31 | End: 2019-07-31 | Stop reason: HOSPADM

## 2019-07-31 RX ORDER — IOPAMIDOL 755 MG/ML
500 INJECTION, SOLUTION INTRAVASCULAR ONCE
Status: COMPLETED | OUTPATIENT
Start: 2019-07-31 | End: 2019-07-31

## 2019-07-31 RX ORDER — KETOROLAC TROMETHAMINE 15 MG/ML
15 INJECTION, SOLUTION INTRAMUSCULAR; INTRAVENOUS ONCE
Status: COMPLETED | OUTPATIENT
Start: 2019-07-31 | End: 2019-07-31

## 2019-07-31 RX ADMIN — HYDROMORPHONE HYDROCHLORIDE 1 MG: 1 INJECTION, SOLUTION INTRAMUSCULAR; INTRAVENOUS; SUBCUTANEOUS at 15:21

## 2019-07-31 RX ADMIN — SODIUM CHLORIDE 1000 ML: 9 INJECTION, SOLUTION INTRAVENOUS at 13:57

## 2019-07-31 RX ADMIN — KETOROLAC TROMETHAMINE 15 MG: 15 INJECTION, SOLUTION INTRAMUSCULAR; INTRAVENOUS at 15:21

## 2019-07-31 RX ADMIN — SODIUM CHLORIDE 65 ML: 9 INJECTION, SOLUTION INTRAVENOUS at 14:34

## 2019-07-31 RX ADMIN — HYDROMORPHONE HYDROCHLORIDE 0.5 MG: 1 INJECTION, SOLUTION INTRAMUSCULAR; INTRAVENOUS; SUBCUTANEOUS at 13:56

## 2019-07-31 RX ADMIN — ONDANSETRON HYDROCHLORIDE 4 MG: 2 INJECTION, SOLUTION INTRAMUSCULAR; INTRAVENOUS at 13:56

## 2019-07-31 RX ADMIN — IOPAMIDOL 100 ML: 755 INJECTION, SOLUTION INTRAVENOUS at 14:33

## 2019-07-31 ASSESSMENT — ENCOUNTER SYMPTOMS
DIARRHEA: 0
FLANK PAIN: 1
DYSURIA: 0
VOMITING: 0
HALLUCINATIONS: 1
NAUSEA: 1
ABDOMINAL PAIN: 1

## 2019-07-31 NOTE — ED NOTES
Bed: ED28  Expected date: 7/31/19  Expected time:   Means of arrival:   Comments:  margaret [email or web address] 34 male abd pain

## 2019-07-31 NOTE — ED TRIAGE NOTES
Patient presents from home via EMS with complaints of increasing abdominal pain radiating to right flank. Just discharged from hospital 7/30 with diagnosis of cdiff colitis and sent home with medications vanco, oxycodone and zofran.

## 2019-07-31 NOTE — ED AVS SNAPSHOT
[facility] Department  [address]  [facility] [address]  Phone:  [phone]  Fax:  [phone]                                    Ted Borrero   MRN: [id number]    Department:  [facility] Emergency Department   Date of Visit:  7/31/2019           After Visit Summary Signature Page    I have received my discharge instructions, and my questions have been answered. I have discussed any challenges I see with this plan with the nurse or doctor.    ..........................................................................................................................................  Patient/Patient Representative Signature      ..........................................................................................................................................  Patient Representative Print Name and Relationship to Patient    ..................................................               ................................................  Date                                   Time    ..........................................................................................................................................  Reviewed by Signature/Title    ...................................................              ..............................................  Date                                               Time          22EPIC Rev 08/18

## 2019-07-31 NOTE — ED PROVIDER NOTES
"  History     Chief Complaint:  Abdominal Pain    HPI   Ted Borrero is a 34 year old male who presents with abdominal pain. The patient was recently admitted on 7/28-7/30 for C diff. and salmonella. Since discharge the patient has been having increasing abdominal pain and new flank pain. He states that he woke up this morning around 0730 and was unable to walk due to pain. He also reports some hallucinations due to pain. The patient states that his diarrhea has since resolved but is now dry heaving. He denies any dysuria. He reports no recent bowel movements.     Allergies:  No Known Allergies     Medications:    Vancomycin  Oxycodone  Zofran   Advil     Past Medical History:    ADD  Colitis    Past Surgical History:    The patient does not have any pertinent past surgical history.    Family History:    Hypertension- mother  ADHD- brother    Social History:  The patient was accompanied to the ED by wife.  Smoking Status: current everyday smoker   Smokeless Tobacco: Never Used  Alcohol Use: Positive  Marital Status:          Review of Systems   Gastrointestinal: Positive for abdominal pain and nausea. Negative for diarrhea and vomiting.   Genitourinary: Positive for flank pain. Negative for dysuria.   Psychiatric/Behavioral: Positive for hallucinations.   All other systems reviewed and are negative.    Physical Exam     Patient Vitals for the past 24 hrs:   BP Temp Temp src Pulse Heart Rate Resp SpO2 Height   07/31/19 1530 -- -- -- -- -- -- 95 % --   07/31/19 1515 -- -- -- -- -- -- 96 % --   07/31/19 1500 -- -- -- -- -- -- 95 % --   07/31/19 1415 -- -- -- -- -- -- 91 % --   07/31/19 1400 (!) 143/91 -- -- 96 -- -- 96 % --   07/31/19 1345 131/83 -- -- 94 -- -- 96 % --   07/31/19 1330 (!) 151/79 -- -- 85 -- -- 97 % --   07/31/19 1321 (!) 142/90 98.3  F (36.8  C) Oral -- 99 20 97 % 1.753 m (5' 9\")   07/31/19 1315 (!) 142/90 -- -- 102 -- -- 99 % --         Physical Exam   VS: Reviewed per above  HENT: Mucous " membranes moist  EYES: sclera anicteric  CV: Rate as noted,  regular rhythm.   RESP: Effort normal. Breath sounds are normal bilaterally.  GI: R CVAT, no abdominal  tenderness/rebound/guarding, not distended.  NEURO: Alert, moving all extremities  MSK: No deformity of the extremities  SKIN: Warm and dry    Emergency Department Course     Imaging:  Radiology findings were communicated with the patient who voiced understanding of the findings.    CT abdomen Pelvis w contrast:  1. Obstructing 0.3 cm stone at the right ureterovesical junction  causes mild right hydronephrosis and delayed enhancement of the right  kidney.    2. Previously described bowel wall thickening involving the right  colon has improved significantly compared to 7/28/2019.   Reading per radiology    Laboratory:  Laboratory findings were communicated with the patient who voiced understanding of the findings.    UA with micro: urine blood moderate (A) protein albumin 10 (A) RBC/HPF 92 (H) mucus present (A) o/w negative    CBC: WBC 14.3(H), HGB 10.3(L),   CMP: glucose 105 (H) BUN 6 (L) albumin 3.0 (L) protein total 6.7 (L) o/w WNL (Creatinine 1.07)    Interventions:  1356 Zofran 4 mg IV  1356 Dilaudid 0.5 mg IV  1357 NS, 1 L, IV  1521 Dilaudid 1 mg IV  1521 Toradol 15 mg IV    Emergency Department Course:     Nursing notes and vitals reviewed.    1325 I performed an exam of the patient as documented above.     1428 IV was inserted and blood was drawn for laboratory testing, results above.    1434 The patient provided a urine sample here in the emergency department. This was sent for laboratory testing, findings above.    1432 The patient was sent for a CT abdomen/pelvis while here in the emergency department, findings above.    1517 I returned to check on patient. I personally reviewed the laboratory and imaging results with the patient and answered all related questions prior to admit.     1553 I returned to check on patient. I personally  reviewed the laboratory and imaging results with the patient and answered all related questions prior to discharge.      Impression & Plan      Medical Decision Making:  Ted Borrero is a 34 year old male who presented with unilateral flank & abdominal pain consistent with renal colic. CT confirms a 3mm ureteral stone at the R UVJ.  Renal function is normal/baseline.  CT and lab workup show no other alternative etiology that could be causing his symptoms (e.g., AAA, appendicitis, pyelonephritis). There is no fever or convincing evidence of a urinary tract infection. On recheck, his pain is controlled with interventions in the ED. I will prescribe supportive medications and  Flomax to facilitate stone passage. I have advised him to return for uncontrolled pain, vomiting, fever, or any other concerning symptoms. I also advised to strain his urine to look for a stone and f/u with urology.     Diagnosis:    ICD-10-CM    1. Ureterolithiasis N20.1         Disposition:   The patient is discharged to home.    Discharge medications:START taking      Dose / Directions   oxyCODONE-acetaminophen 5-325 MG tablet  Commonly known as:  PERCOCET      Dose:  1-2 tablet  Take 1-2 tablets by mouth every 6 hours as needed for pain  Quantity:  12 tablet  Refills:  0     tamsulosin 0.4 MG capsule  Commonly known as:  FLOMAX      Dose:  0.4 mg  Take 1 capsule (0.4 mg) by mouth daily for 10 doses  Quantity:  10 capsule  Refills:  0       Scribe Disclosure:  SHANNAN, Suzanna Hawley, am serving as a scribe at 3:18 PM on 7/31/2019 to document services personally performed by Ian Mejia MD based on my observations and the provider's statements to me.  [facility] EMERGENCY DEPARTMENT       Ian Mejia MD  07/31/19 8703

## 2019-10-03 NOTE — ED AVS SNAPSHOT
[facility] Emergency Department    [address]    [facility] [address]    Phone:  [phone]    Fax:  [phone]                                       Ted Borrero   MRN: [id number]    Department:  [facility] Emergency Department   Date of Visit:  11/9/2017           After Visit Summary Signature Page     I have received my discharge instructions, and my questions have been answered. I have discussed any challenges I see with this plan with the nurse or doctor.    ..........................................................................................................................................  Patient/Patient Representative Signature      ..........................................................................................................................................  Patient Representative Print Name and Relationship to Patient    ..................................................               ................................................  Date                                            Time    ..........................................................................................................................................  Reviewed by Signature/Title    ...................................................              ..............................................  Date                                                            Time           Please click on link to see image.

## 2019-10-28 ENCOUNTER — HOSPITAL ENCOUNTER (EMERGENCY)
Facility: CLINIC | Age: 34
Discharge: HOME | End: 2019-10-28
Attending: EMERGENCY MEDICINE | Admitting: EMERGENCY MEDICINE
Payer: COMMERCIAL

## 2019-10-28 ENCOUNTER — APPOINTMENT (OUTPATIENT)
Dept: CT IMAGING | Facility: CLINIC | Age: 34
End: 2019-10-28
Attending: EMERGENCY MEDICINE
Payer: COMMERCIAL

## 2019-10-28 VITALS
TEMPERATURE: 97.5 F | WEIGHT: 225 LBS | DIASTOLIC BLOOD PRESSURE: 73 MMHG | HEART RATE: 67 BPM | OXYGEN SATURATION: 97 % | RESPIRATION RATE: 18 BRPM | SYSTOLIC BLOOD PRESSURE: 133 MMHG | BODY MASS INDEX: 33.23 KG/M2

## 2019-10-28 DIAGNOSIS — R10.9 BILATERAL FLANK PAIN: ICD-10-CM

## 2019-10-28 LAB
ALBUMIN SERPL-MCNC: 3.8 G/DL (ref 3.4–5)
ALP SERPL-CCNC: 54 U/L (ref 40–150)
ALT SERPL W P-5'-P-CCNC: 26 U/L (ref 0–70)
ANION GAP SERPL CALCULATED.3IONS-SCNC: 7 MMOL/L (ref 3–14)
AST SERPL W P-5'-P-CCNC: 29 U/L (ref 0–45)
BASOPHILS # BLD AUTO: 0.1 10E9/L (ref 0–0.2)
BASOPHILS NFR BLD AUTO: 1.1 %
BILIRUB SERPL-MCNC: 0.4 MG/DL (ref 0.2–1.3)
BUN SERPL-MCNC: 18 MG/DL (ref 7–30)
CALCIUM SERPL-MCNC: 8.9 MG/DL (ref 8.5–10.1)
CHLORIDE SERPL-SCNC: 110 MMOL/L (ref 94–109)
CO2 SERPL-SCNC: 23 MMOL/L (ref 20–32)
CREAT SERPL-MCNC: 0.68 MG/DL (ref 0.66–1.25)
DIFFERENTIAL METHOD BLD: ABNORMAL
EOSINOPHIL # BLD AUTO: 0.4 10E9/L (ref 0–0.7)
EOSINOPHIL NFR BLD AUTO: 3.4 %
ERYTHROCYTE [DISTWIDTH] IN BLOOD BY AUTOMATED COUNT: 13.1 % (ref 10–15)
GFR SERPL CREATININE-BSD FRML MDRD: >90 ML/MIN/{1.73_M2}
GLUCOSE SERPL-MCNC: 127 MG/DL (ref 70–99)
HCT VFR BLD AUTO: 42.3 % (ref 40–53)
HGB BLD-MCNC: 13.4 G/DL (ref 13.3–17.7)
IMM GRANULOCYTES # BLD: 0.1 10E9/L (ref 0–0.4)
IMM GRANULOCYTES NFR BLD: 0.6 %
LIPASE SERPL-CCNC: 552 U/L (ref 73–393)
LYMPHOCYTES # BLD AUTO: 4 10E9/L (ref 0.8–5.3)
LYMPHOCYTES NFR BLD AUTO: 31.5 %
MCH RBC QN AUTO: 30.1 PG (ref 26.5–33)
MCHC RBC AUTO-ENTMCNC: 31.7 G/DL (ref 31.5–36.5)
MCV RBC AUTO: 95 FL (ref 78–100)
MONOCYTES # BLD AUTO: 1 10E9/L (ref 0–1.3)
MONOCYTES NFR BLD AUTO: 8 %
NEUTROPHILS # BLD AUTO: 7.1 10E9/L (ref 1.6–8.3)
NEUTROPHILS NFR BLD AUTO: 55.4 %
NRBC # BLD AUTO: 0 10*3/UL
NRBC BLD AUTO-RTO: 0 /100
PLATELET # BLD AUTO: 291 10E9/L (ref 150–450)
POTASSIUM SERPL-SCNC: 4.3 MMOL/L (ref 3.4–5.3)
PROT SERPL-MCNC: 7.4 G/DL (ref 6.8–8.8)
RBC # BLD AUTO: 4.45 10E12/L (ref 4.4–5.9)
SODIUM SERPL-SCNC: 140 MMOL/L (ref 133–144)
WBC # BLD AUTO: 12.8 10E9/L (ref 4–11)

## 2019-10-28 PROCEDURE — 96361 HYDRATE IV INFUSION ADD-ON: CPT

## 2019-10-28 PROCEDURE — 83690 ASSAY OF LIPASE: CPT | Performed by: EMERGENCY MEDICINE

## 2019-10-28 PROCEDURE — 85025 COMPLETE CBC W/AUTO DIFF WBC: CPT | Performed by: EMERGENCY MEDICINE

## 2019-10-28 PROCEDURE — 25000128 H RX IP 250 OP 636: Performed by: EMERGENCY MEDICINE

## 2019-10-28 PROCEDURE — 96374 THER/PROPH/DIAG INJ IV PUSH: CPT

## 2019-10-28 PROCEDURE — 25800030 ZZH RX IP 258 OP 636: Performed by: EMERGENCY MEDICINE

## 2019-10-28 PROCEDURE — 99285 EMERGENCY DEPT VISIT HI MDM: CPT | Mod: 25

## 2019-10-28 PROCEDURE — 96375 TX/PRO/DX INJ NEW DRUG ADDON: CPT

## 2019-10-28 PROCEDURE — 74176 CT ABD & PELVIS W/O CONTRAST: CPT

## 2019-10-28 PROCEDURE — 80053 COMPREHEN METABOLIC PANEL: CPT | Performed by: EMERGENCY MEDICINE

## 2019-10-28 RX ORDER — KETOROLAC TROMETHAMINE 15 MG/ML
15 INJECTION, SOLUTION INTRAMUSCULAR; INTRAVENOUS ONCE
Status: COMPLETED | OUTPATIENT
Start: 2019-10-28 | End: 2019-10-28

## 2019-10-28 RX ORDER — SODIUM CHLORIDE 9 MG/ML
1000 INJECTION, SOLUTION INTRAVENOUS CONTINUOUS
Status: DISCONTINUED | OUTPATIENT
Start: 2019-10-28 | End: 2019-10-28 | Stop reason: HOSPADM

## 2019-10-28 RX ORDER — IBUPROFEN 600 MG/1
600 TABLET, FILM COATED ORAL EVERY 6 HOURS PRN
Qty: 30 TABLET | Refills: 1 | Status: SHIPPED | OUTPATIENT
Start: 2019-10-28 | End: 2020-11-13

## 2019-10-28 RX ORDER — ONDANSETRON 4 MG/1
4 TABLET, ORALLY DISINTEGRATING ORAL EVERY 6 HOURS PRN
Qty: 10 TABLET | Refills: 0 | Status: SHIPPED | OUTPATIENT
Start: 2019-10-28 | End: 2019-10-31

## 2019-10-28 RX ORDER — TIZANIDINE 2 MG/1
2 TABLET ORAL 3 TIMES DAILY
Qty: 20 TABLET | Refills: 0 | Status: SHIPPED | OUTPATIENT
Start: 2019-10-28 | End: 2020-11-13

## 2019-10-28 RX ORDER — ONDANSETRON 2 MG/ML
4 INJECTION INTRAMUSCULAR; INTRAVENOUS ONCE
Status: COMPLETED | OUTPATIENT
Start: 2019-10-28 | End: 2019-10-28

## 2019-10-28 RX ADMIN — KETOROLAC TROMETHAMINE 15 MG: 15 INJECTION, SOLUTION INTRAMUSCULAR; INTRAVENOUS at 10:22

## 2019-10-28 RX ADMIN — SODIUM CHLORIDE 1000 ML: 9 INJECTION, SOLUTION INTRAVENOUS at 10:21

## 2019-10-28 RX ADMIN — ONDANSETRON HYDROCHLORIDE 4 MG: 2 INJECTION, SOLUTION INTRAMUSCULAR; INTRAVENOUS at 10:21

## 2019-10-28 RX ADMIN — SODIUM CHLORIDE 1000 ML: 9 INJECTION, SOLUTION INTRAVENOUS at 11:26

## 2019-10-28 ASSESSMENT — ENCOUNTER SYMPTOMS
ABDOMINAL PAIN: 1
NAUSEA: 0
VOMITING: 0
FLANK PAIN: 1
DIARRHEA: 0

## 2019-10-28 NOTE — ED AVS SNAPSHOT
[facility] Emergency Department  [address]  [facility] [address]  Phone:  [phone]  Fax:  [phone]                                    Ted Borrero   MRN: [id number]    Department:  [facility] Emergency Department   Date of Visit:  10/28/2019           After Visit Summary Signature Page    I have received my discharge instructions, and my questions have been answered. I have discussed any challenges I see with this plan with the nurse or doctor.    ..........................................................................................................................................  Patient/Patient Representative Signature      ..........................................................................................................................................  Patient Representative Print Name and Relationship to Patient    ..................................................               ................................................  Date                                   Time    ..........................................................................................................................................  Reviewed by Signature/Title    ...................................................              ..............................................  Date                                               Time          22EPIC Rev 08/18

## 2019-10-28 NOTE — DISCHARGE INSTRUCTIONS
Your lab work does not show any significant abnormality except for slightly elevated lipase level.  Your CT scan shows no kidney stones and no signs of inflammation of the pancreas.  Please follow-up with primary care as you have had other issues including the C. difficile issue.  We do not recommend antibiotics or opiates today.  If pain is progressively worse or vomiting continues please return for recheck of your lipase level.  Please follow-up with your regular doctor for reassessment and for abdominal pain and flank pain of unclear etiology.

## 2019-10-28 NOTE — ED TRIAGE NOTES
Pt here with c/o bilateral flank pain since Friday. Hx kidney stones, feels similar. Denies n/v/d or dysuria. ABC intact.

## 2019-10-28 NOTE — ED PROVIDER NOTES
History     Chief Complaint:  Flank Pain      HPI   Ted Borrero is a 34 year old male with a history of colitis, ureterolithiasis, and ADD who presents with bilateral flank pain. Patient reports intermittent flank pain since he was discharged from the hospital in July. He was admitted 7/28 with abdominal cramping and diarrhea, with stool cultures positive for C. Diff and Salmonella. He was started on PO Vancomycin. Patient was later seen 7/31 for ongoing abdominal pain. CT showed 3mm ureteral stone at the R UVJ, and he was discharged to home with Tamsulosin and Percocet. Now, he states that, beginning four days ago, the bilateral flank pain became more constant. The pain is similar to his previous kidney stones, and wraps around to his abdomen. This is worse with sitting up and movement. He denies associated diarrhea, nausea, and emesis.     Allergies:  No Known Drug Allergies     Medications:    The patient is currently on no regular medications.     Past Medical History:    Colitis   Ureterolithiasis   ADD     Past Surgical History:    History reviewed. No pertinent past surgical history.     Family History:    History reviewed. No pertinent family history.      Social History:  The patient was alone.  Smoking Status: Current every day smoker  Smokeless Tobacco: Never  Alcohol Use: Yes   Marital Status:  Single      Review of Systems   Gastrointestinal: Positive for abdominal pain. Negative for diarrhea, nausea and vomiting.   Genitourinary: Positive for flank pain.   All other systems reviewed and are negative.      Physical Exam     Patient Vitals for the past 24 hrs:   BP Temp Temp src Heart Rate Resp SpO2 Weight   10/28/19 0932 (!) 148/81 97.5  F (36.4  C) Oral 101 16 97 % 102.1 kg (225 lb)      Physical Exam  Vitals signs reviewed.   HENT:      Head: Normocephalic.   Cardiovascular:      Rate and Rhythm: Normal rate.      Pulses: Normal pulses.   Pulmonary:      Effort: Pulmonary effort is normal.       Breath sounds: Normal breath sounds.   Abdominal:      General: Abdomen is flat.      Palpations: Abdomen is soft.   Musculoskeletal: Normal range of motion.   Skin:     General: Skin is warm.      Capillary Refill: Capillary refill takes less than 2 seconds.   Neurological:      General: No focal deficit present.      Mental Status: He is alert.   Psychiatric:         Mood and Affect: Mood normal.         Emergency Department Course     Imaging:  Radiology findings were communicated with the patient who voiced understanding of the findings.    Abd/pelvis CT no contrast - Stone Protocol  Preliminary Result  IMPRESSION:  1. Previously seen right distal ureteral stone has passed. No other  urinary tract stones or hydronephrosis identified.  2. Otherwise unremarkable.  Report per radiology     Laboratory:  Laboratory findings were communicated with the patient who voiced understanding of the findings.    CBC: WBC 12.8 (H) o/w WNL. (HGB 13.4, )   CMP: Chloride 110 (H), Glucose 127 (H) o/w WNL (Creatinine 0.68)    Lipase: 552 (H)      Procedures:  None     Interventions:  1021 - NS Bolus 1,000mL IV    Zofran 4mg IV   1022 - Toradol 15mg IV       Emergency Department Course:  Past medical records, nursing notes, and vitals reviewed.    1000: I performed an exam of the patient as documented above.     IV was inserted and blood was drawn for laboratory testing, results above.    The patient was placed on continuous cardiac and pulse ox monitoring.    1149: Patient rechecked and updated.  Patient sleeping on recheck, but awoke. He would like a CT scan to check on his known kidney stones, though his pain is now under control.     The patient was sent for a CT Abdomen Pelvis w/o Contrast while in the emergency department, results above.     1320: Patient rechecked and updated.      Findings and plan explained to the Patient. Patient discharged home with instructions regarding supportive care, medications, and reasons to  return. The importance of close follow-up was reviewed. The patient was prescribed Zofran, ibuprofen, and Tizanidine.      I personally reviewed the laboratory and imaging results with the Patient and answered all related questions prior to discharge.      Impression & Plan     Medical Decision Making:  Ted Borrero is a 34 year old male who presents to the emergency department today with bilateral flank pain right greater than left some nausea associated with it.  Clinical exam is indeterminate for renal colic patient symptoms seem similar to renal colic though bilateral in nature with seems less likely.  Urinalysis and lab work are normal patient sent for CT scan patient is will be discharged if CT scan shows no acute emergency I doubt this is patient's symptoms have improved and lab work is unremarkable.  If CT negative suspect musculoskeletal pain will offer symptomatic medications and follow-up with primary care.    Discharge Diagnosis:    ICD-10-CM    1. Bilateral flank pain R10.9        Disposition:  Discharged to home.      Discharge Medications:  New Prescriptions    IBUPROFEN (ADVIL/MOTRIN) 600 MG TABLET    Take 1 tablet (600 mg) by mouth every 6 hours as needed for moderate pain    ONDANSETRON (ZOFRAN ODT) 4 MG ODT TAB    Take 1 tablet (4 mg) by mouth every 6 hours as needed for nausea    TIZANIDINE (ZANAFLEX) 2 MG TABLET    Take 1 tablet (2 mg) by mouth 3 times daily       Scribe Disclosure:  I, Char Garcia, am serving as a scribe at 10:02 AM on 10/28/2019 to document services personally performed by Tio Nicole MD based on my observations and the provider's statements to me. Char Garcia  10/28/2019   [facility] EMERGENCY DEPARTMENT       Tio Nicole MD  11/02/19 9671

## 2019-11-01 ENCOUNTER — HOSPITAL ENCOUNTER (EMERGENCY)
Facility: CLINIC | Age: 34
Discharge: HOME | End: 2019-11-01
Attending: EMERGENCY MEDICINE | Admitting: EMERGENCY MEDICINE
Payer: COMMERCIAL

## 2019-11-01 VITALS
SYSTOLIC BLOOD PRESSURE: 134 MMHG | HEART RATE: 109 BPM | RESPIRATION RATE: 16 BRPM | OXYGEN SATURATION: 97 % | DIASTOLIC BLOOD PRESSURE: 89 MMHG | TEMPERATURE: 97.6 F

## 2019-11-01 DIAGNOSIS — J02.0 ACUTE STREPTOCOCCAL PHARYNGITIS: ICD-10-CM

## 2019-11-01 LAB
DEPRECATED S PYO AG THROAT QL EIA: ABNORMAL
SPECIMEN SOURCE: ABNORMAL

## 2019-11-01 PROCEDURE — 25000132 ZZH RX MED GY IP 250 OP 250 PS 637: Performed by: EMERGENCY MEDICINE

## 2019-11-01 PROCEDURE — 99283 EMERGENCY DEPT VISIT LOW MDM: CPT

## 2019-11-01 PROCEDURE — 25000131 ZZH RX MED GY IP 250 OP 636 PS 637: Performed by: EMERGENCY MEDICINE

## 2019-11-01 PROCEDURE — 87880 STREP A ASSAY W/OPTIC: CPT | Performed by: EMERGENCY MEDICINE

## 2019-11-01 RX ORDER — DEXAMETHASONE 6 MG/1
6 TABLET ORAL DAILY
Qty: 2 TABLET | Refills: 0 | Status: SHIPPED | OUTPATIENT
Start: 2019-11-01 | End: 2020-11-13

## 2019-11-01 RX ORDER — IBUPROFEN 800 MG/1
800 TABLET, FILM COATED ORAL EVERY 8 HOURS PRN
Qty: 60 TABLET | Refills: 0 | Status: SHIPPED | OUTPATIENT
Start: 2019-11-01 | End: 2019-11-09

## 2019-11-01 RX ORDER — IBUPROFEN 600 MG/1
600 TABLET, FILM COATED ORAL ONCE
Status: COMPLETED | OUTPATIENT
Start: 2019-11-01 | End: 2019-11-01

## 2019-11-01 RX ORDER — DEXAMETHASONE 4 MG/1
8 TABLET ORAL ONCE
Status: COMPLETED | OUTPATIENT
Start: 2019-11-01 | End: 2019-11-01

## 2019-11-01 RX ORDER — PENICILLIN V POTASSIUM 500 MG/1
500 TABLET, FILM COATED ORAL 2 TIMES DAILY
Qty: 20 TABLET | Refills: 0 | Status: SHIPPED | OUTPATIENT
Start: 2019-11-01 | End: 2019-11-11

## 2019-11-01 RX ADMIN — IBUPROFEN 600 MG: 600 TABLET, FILM COATED ORAL at 21:04

## 2019-11-01 RX ADMIN — DEXAMETHASONE 8 MG: 4 TABLET ORAL at 21:04

## 2019-11-01 ASSESSMENT — ENCOUNTER SYMPTOMS
ABDOMINAL PAIN: 0
APPETITE CHANGE: 1
NAUSEA: 1
VOMITING: 1
SORE THROAT: 1
COUGH: 1
FEVER: 0

## 2019-11-01 NOTE — ED AVS SNAPSHOT
[facility] Emergency Department  [address]  [facility] [address]  Phone:  [phone]  Fax:  [phone]                                    Ted Borrero   MRN: [id number]    Department:  [facility] Emergency Department   Date of Visit:  11/1/2019           After Visit Summary Signature Page    I have received my discharge instructions, and my questions have been answered. I have discussed any challenges I see with this plan with the nurse or doctor.    ..........................................................................................................................................  Patient/Patient Representative Signature      ..........................................................................................................................................  Patient Representative Print Name and Relationship to Patient    ..................................................               ................................................  Date                                   Time    ..........................................................................................................................................  Reviewed by Signature/Title    ...................................................              ..............................................  Date                                               Time          22EPIC Rev 08/18

## 2019-11-02 NOTE — ED PROVIDER NOTES
History     Chief Complaint:  Pharyngitis    HPI   Ted Borrero is a 34 year old male who presents to the emergency department for evaluation of sore throat. The patient reports he has experienced sore throat and cough since yesterday. He further reports some nausea and vomiting yesterday, as well as decreased appetite and fluid intake secondary to the pain. He contacted a virtual MD with these symptoms and was instructed to present to the ED. The patient denies any abdominal pain or recorded fever. He has been taking Tylenol at home without improvement in his symptoms, last around 0900. He did not receive an influenza immunization this year.    Allergies:  NKDA     Medications:    Zanaflex    Past Medical History:    Colitis  ADD    Past Surgical History:    The patient does not have any pertinent past surgical history.    Family History:    No past pertinent family history.    Social History:  Presents with wife and sons.  Current every day smoker, 0.25 ppd.  Positive for alcohol use.   Marital Status:  Single [1]     Review of Systems   Constitutional: Positive for appetite change. Negative for fever.   HENT: Positive for sore throat.    Respiratory: Positive for cough.    Gastrointestinal: Positive for nausea and vomiting. Negative for abdominal pain.   All other systems reviewed and are negative.      Physical Exam     Patient Vitals for the past 24 hrs:   BP Temp Temp src Pulse Resp SpO2   11/01/19 1947 134/89 97.6  F (36.4  C) Oral 109 16 97 %     Physical Exam  General: Patient is alert, awake and interactive when I enter the room  Head: The scalp, face, and head appear normal  Eyes: Conjunctivae are normal  ENT: The nose is normal, Pinnae are normal, External acoustic canals are normal. Oropharyngeal erythema and exudate.  Neck: Trachea midline  CV: Pulses are normal.   Resp: No respiratory distress   Musc: Normal muscular tone, moving all extremities.  Skin: No rash or lesions noted  Neuro:  Speech is  normal and fluent. Face is symmetric.   Psych: Normal affect.  Appropriate interactions.    Emergency Department Course     Laboratory:  Rapid strep screen: Positive    Interventions:  2104 Ibuprofen 600 mg PO   Decadron 8 mg PO    Emergency Department Course:  Nursing notes and vitals reviewed. 2039 I performed an exam of the patient as documented above.     The patient's throat was swabbed and this sample was sent for rapid strep screen, findings above.     2041 I rechecked the patient and discussed the results of his workup thus far.     Findings and plan explained to the Patient. Patient discharged home with instructions regarding supportive care, medications, and reasons to return. The importance of close follow-up was reviewed. The patient was prescribed Decadron, ibuprofen, Veetid.    I personally reviewed the laboratory results with the Patient and answered all related questions prior to discharge.    Impression & Plan      Medical Decision Making:  Ted Borrero is a 34 year old male who presents for evaluation of a sore throat and clinical evidence of pharyngitis.  The rapid strep test is positive. There is no clinical evidence of peritonsillar abscess, retropharyngeal abscess, Lemierre's Syndrome, epiglottis, or Fabien's angina. The patient's symptoms are consistent with streptococcal pharyngitis.  I have recommended treatment with antibiotics and analgesics.  Return if increasing pain, change in voice, neck pain, vomiting, fever, or shortness of breath. Follow-up with primary physician if not improving in 3-5 days.     Diagnosis:    ICD-10-CM   1. Acute streptococcal pharyngitis J02.0       Disposition:  discharged to home    Discharge Medications:  New Prescriptions    DEXAMETHASONE (DECADRON) 6 MG TABLET    Take 1 tablet (6 mg) by mouth daily    IBUPROFEN (ADVIL/MOTRIN) 800 MG TABLET    Take 1 tablet (800 mg) by mouth every 8 hours as needed for moderate pain    PENICILLIN V (VEETID) 500 MG TABLET     Take 1 tablet (500 mg) by mouth 2 times daily for 10 days     I, Edward Garcia, am serving as a scribe on 11/1/2019 at 8:36 PM to personally document services performed by Fran Portillo* based on my observations and the provider's statements to me.     Edward Garcia  11/1/2019   [facility] EMERGENCY DEPARTMENT       Fran Portillo MD  11/01/19 6421

## 2019-11-02 NOTE — ED TRIAGE NOTES
Pt in with C/O pharyngitis. Pt reports onset yesterday. Pt states he called his insurance virtual MD service and they told him to come in for evaluation.

## 2020-02-12 ENCOUNTER — OFFICE VISIT (OUTPATIENT)
Dept: URGENT CARE | Facility: URGENT CARE | Age: 35
End: 2020-02-12
Payer: COMMERCIAL

## 2020-02-12 VITALS
DIASTOLIC BLOOD PRESSURE: 78 MMHG | SYSTOLIC BLOOD PRESSURE: 130 MMHG | TEMPERATURE: 98 F | OXYGEN SATURATION: 98 % | HEART RATE: 78 BPM | RESPIRATION RATE: 20 BRPM

## 2020-02-12 DIAGNOSIS — S61.209A OPEN WOUND OF FINGER, INITIAL ENCOUNTER: Primary | ICD-10-CM

## 2020-02-12 PROCEDURE — 12001 RPR S/N/AX/GEN/TRNK 2.5CM/<: CPT | Performed by: FAMILY MEDICINE

## 2020-02-12 NOTE — PROGRESS NOTES
SUBJECTIVE:     Chief Complaint   Patient presents with     Urgent Care     finger cut L in a garage door     Ted Borrero is a 34 year old male who presents to the clinic with a laceration on the left index finger pad sustained 2 hour(s) ago.  This is a non-work related and accidental injury.  Patient is right handed  Mechanism of injury: knife.    Associated symptoms: Denies numbness, weakness, or loss of function  Last tetanus booster within 10 years: yes, 2016    EXAM:   The patient appears today in alert,no apparent distress distress  VITALS: /78   Pulse 78   Temp 98  F (36.7  C) (Tympanic)   Resp 20   SpO2 98%     Size of laceration: 2 centimeters  Characteristics of the laceration: bleeding- mild, clean and straight  Tendon function intact: yes  Sensation to light touch intact: yes  Pulses intact: not applicable  Picture included in patient's chart: no    Assessment:  Open wound of finger, initial encounter    PLAN:  PROCEDURE NOTE::  Wound was locally injected with 8 cc's of Lidocaine 1% plain  Good anesthesia was obtained  Prepped and draped in the usual sterile fashion  Wound cleaned with betadine/saline solution  Wound cleaned with Shur-Clens  Laceration was closed using 5 4-0 nylon interrupted sutures    After care instructions:  Keep wound clean and dry for the next 24-48 hours  Sutures out in 7 days  Signs of infection discussed today  Apply anti-bacterial ointment for 5-7 days  May return to work as long as wound is kept clean and dry  Discussed the probability of scarring    Vamsi Gallardo MD, MD  February 12, 2020 6:09 PM

## 2020-02-13 NOTE — PATIENT INSTRUCTIONS
Keep wound clean and dry for 24 hours  Topical antibiotic ointment to finger twice a day for 5-7 days    Suture removal in 7-10 days      Patient Education     Extremity Laceration: Stitches, Staples, or Tape  A laceration is a cut through the skin. If it is deep, it may require stitches or staples to close so it can heal. Minor cuts may be treated with surgical tape closures, or skin glue.  X-rays may be done if something may have entered the skin through the cut. You may also need a tetanus shot if you are not up to date on this vaccine.  Home care    Follow the healthcare provider s instructions on how to care for the cut.    Wash your hands with soap and warm water before and after caring for your wound. This is to help prevent infection.    Keep the wound clean and dry. If a bandage was applied and it becomes wet or dirty, replace it. Otherwise, leave it in place for the first 24 hours, then change it once a day or as directed.    If stitches or staples were used, clean the wound daily:  ? After removing the bandage, wash the area with soap and water. Use a wet cotton swab to loosen and remove any blood or crust that forms.  ? After cleaning, keep the wound clean and dry. Talk with your healthcare provider before putting any antibiotic ointment on the wound. Reapply the bandage.    You may remove the bandage to shower as usual after the first 24 hours, but don't soak the area in water (no swimming) until the stitches or staples are removed.    If surgical tape closures were used, keep the area clean and dry. If it becomes wet, blot it dry with a towel. Let the surgical tape fall off on its own.    The healthcare provider may prescribe an antibiotic cream or ointment to prevent infection. He or she may also prescribe an antibiotic pill. Don't stop taking this medicine until you have finished it all or the provider tells you to stop.    The provider may also prescribe medicine for pain. Follow the instructions  for taking these medicines.    Don't do activities that may reopen your wound.  Follow-up care  Follow up with your healthcare provider, or as advised. Most skin wounds heal within 10 days. But an infection may sometimes occur even with proper treatment. Check the wound daily for the signs of infection listed below. Stitches and staples should be removed within 7 to14 days. If surgical tape closures were used, you may remove them after 10 days if they have not fallen off by then.   When to seek medical advice  Call your healthcare provider right away if any of these occur:    Wound bleeding not controlled by direct pressure    Signs of infection, including increasing pain in the wound, increasing wound redness or swelling, or pus or bad odor coming from the wound    Fever of 100.4 F (38 C) or higher, or as directed by your healthcare provider    Stitches or staples come apart or fall out or surgical tape falls off before 7 days    Wound edges reopen    Wound changes colors    Numbness occurs around the wound     Decreased movement around the injured area  Date Last Reviewed: 7/1/2017 2000-2019 [employer]. [address] [place] [address], [place] [address]. All rights reserved. This information is not intended as a substitute for professional medical care. Always follow your healthcare professional's instructions.

## 2020-02-22 ENCOUNTER — OFFICE VISIT (OUTPATIENT)
Dept: URGENT CARE | Facility: URGENT CARE | Age: 35
End: 2020-02-22
Payer: COMMERCIAL

## 2020-02-22 VITALS
DIASTOLIC BLOOD PRESSURE: 72 MMHG | HEART RATE: 104 BPM | OXYGEN SATURATION: 96 % | TEMPERATURE: 98.3 F | RESPIRATION RATE: 12 BRPM | SYSTOLIC BLOOD PRESSURE: 120 MMHG

## 2020-02-22 DIAGNOSIS — Z48.02 ENCOUNTER FOR REMOVAL OF SUTURES: Primary | ICD-10-CM

## 2020-02-22 PROCEDURE — 99024 POSTOP FOLLOW-UP VISIT: CPT | Performed by: PHYSICIAN ASSISTANT

## 2020-02-22 NOTE — NURSING NOTE
"Chief Complaint   Patient presents with     Urgent Care     Suture Removal     sutures put in last Thursday in his left index finger.  Pt is here today for removal.     Initial /72 (BP Location: Left arm, Patient Position: Sitting, Cuff Size: Adult Regular)   Pulse 104   Temp 98.3  F (36.8  C) (Oral)   Resp 12   SpO2 96%  Estimated body mass index is 33.23 kg/m  as calculated from the following:    Height as of 7/31/19: 1.753 m (5' 9\").    Weight as of 10/28/19: 102.1 kg (225 lb)..  BP completed using cuff size: regular  Berta Ordonez R.N.      Pt was  Here for suture removal.  Suture site is clean, dry and healing well.  No redness or drainage.  Removed 5 sutures without any trouble and patient tolerated well.  Instructed patient to continue to keep site clean and be very careful for the next few weeks as bumping it hard could cause the suture line to open up.  Applied bacitracin and a bandaid before discharge patient.  "

## 2020-02-22 NOTE — PROGRESS NOTES
Pt was here for a suture removal of 5 stiches in left index finger.  The suture site was unremarkable with no redness and no drainage.  I was able to remove all 5 sutures with no difficulty and patient tolerated well.

## 2020-03-02 ENCOUNTER — HEALTH MAINTENANCE LETTER (OUTPATIENT)
Age: 35
End: 2020-03-02

## 2020-05-17 ENCOUNTER — HOSPITAL ENCOUNTER (EMERGENCY)
Facility: CLINIC | Age: 35
Discharge: HOME | End: 2020-05-17
Attending: EMERGENCY MEDICINE | Admitting: EMERGENCY MEDICINE
Payer: COMMERCIAL

## 2020-05-17 VITALS
DIASTOLIC BLOOD PRESSURE: 96 MMHG | OXYGEN SATURATION: 100 % | RESPIRATION RATE: 14 BRPM | HEART RATE: 82 BPM | SYSTOLIC BLOOD PRESSURE: 126 MMHG | TEMPERATURE: 98 F

## 2020-05-17 DIAGNOSIS — S61.210A LACERATION OF RIGHT INDEX FINGER WITHOUT FOREIGN BODY WITHOUT DAMAGE TO NAIL, INITIAL ENCOUNTER: ICD-10-CM

## 2020-05-17 PROCEDURE — 99283 EMERGENCY DEPT VISIT LOW MDM: CPT

## 2020-05-17 PROCEDURE — 12001 RPR S/N/AX/GEN/TRNK 2.5CM/<: CPT

## 2020-05-17 RX ORDER — LIDOCAINE HYDROCHLORIDE 10 MG/ML
INJECTION, SOLUTION INFILTRATION; PERINEURAL
Status: DISCONTINUED
Start: 2020-05-17 | End: 2020-05-18 | Stop reason: HOSPADM

## 2020-05-17 ASSESSMENT — ENCOUNTER SYMPTOMS: WOUND: 1

## 2020-05-17 NOTE — ED AVS SNAPSHOT
[facility] Emergency Department  [address]  [facility] [address]  Phone:  [phone]  Fax:  [phone]                                    Ted Borrero   MRN: [id number]    Department:  [facility] Emergency Department   Date of Visit:  5/17/2020           After Visit Summary Signature Page    I have received my discharge instructions, and my questions have been answered. I have discussed any challenges I see with this plan with the nurse or doctor.    ..........................................................................................................................................  Patient/Patient Representative Signature      ..........................................................................................................................................  Patient Representative Print Name and Relationship to Patient    ..................................................               ................................................  Date                                   Time    ..........................................................................................................................................  Reviewed by Signature/Title    ...................................................              ..............................................  Date                                               Time          22EPIC Rev 08/18

## 2020-05-18 ASSESSMENT — ENCOUNTER SYMPTOMS
JOINT SWELLING: 0
FEVER: 0
WEAKNESS: 0
BRUISES/BLEEDS EASILY: 0
NUMBNESS: 0

## 2020-05-18 NOTE — ED PROVIDER NOTES
History     Chief Complaint:  Laceration    HPI   Ted Borrero is a 35 year old right hand dominant male who presents with a laceration to the left second finger while using a knife in the garage earlier today. The patient denies any other symptoms. He thoroughly cleaned the wound and used skin glue to try to close it. The patient notes he previously had a laceration requiring repair in this finger 2 months prior. His tetanus is up to date.   No weakness or numbness of the finger.    Allergies:  No Known Allergies     Medications:   Adderall     Past Medical History:    ADHD  Depression    Past Surgical History:    The patient does not have any pertinent past surgical history.    Family History:    Hypertension- mother   ADHD- brother    Social History:  Smoking Status: current everyday smoker   Smokeless Tobacco: Never Used  Alcohol Use: Positive  Marital Status:  Single     Review of Systems   Constitutional: Negative for fever.   Musculoskeletal: Negative for joint swelling.   Skin: Positive for wound.   Neurological: Negative for weakness and numbness.   Hematological: Does not bruise/bleed easily.       Physical Exam     Patient Vitals for the past 24 hrs:   BP Temp Temp src Pulse Resp SpO2   05/17/20 2157 (!) 143/103 98  F (36.7  C) Oral 91 14 100 %         Physical Exam  Left Upper Ext:  MSK:   No tenderness of the wrist or elbow with full AROM  Injury to digit 2. 1.5 cm laceration overlying the dorsal surface of the PIP joint.  5/5 flexion at MCP, PIP and DIP joints.  5/5 extension of MCP, PIP and DIP joints.  No laxity with lateral stress to the MCP, PIP and DIP.  CV: brisk capillary refill in all 5 digits of the left hand  Neuro: sensation intact to pin prick on the pad of the left digit, strength exam above.  Skin: 1.5 laceration to 2nd finger, no surrounding redness    Emergency Department Course     Procedures    Laceration Repair        LACERATION:  A simple clean 1.5 cm laceration.      LOCATION:   Left second finger      FUNCTION:  Distally sensation, circulation and motor are intact.      ANESTHESIA:  None.       PREPARATION:  Irrigation with Tap Water      DEBRIDEMENT:  no debridement      CLOSURE:  Wound was closed with Dermabond    Emergency Department Course:     Nursing notes and vitals reviewed.    2236 I performed an exam of the patient as documented above. I recommended sutures for optimal healing. The patient declined and prefers derma bond and steri strips.     2248 I repaired the patient's laceration, see procedure above.     2253 I answered all questions prior to discharge.     Impression & Plan      Medical Decision Making:  Ted Borrero is a 35 year old male who presents to the emergency department today for evaluation of a laceration to left second finger.  The wound was examined in a bloodless field through full AROM of the nearest joint.  There was no clear evidence of deep structure injury (patient declined local anesthesia and I informed him that exam would be improved if finger was numbed and could allow for more thorough exam of the cut).  The laceration was repaired as documented above.  No complications were noted.  CMS was distally intact following repair.  The patient was given wound care instructions.      Diagnosis:    ICD-10-CM    1. Laceration of right index finger without foreign body without damage to nail, initial encounter  S61.210A      Disposition:   Findings and plan explained to the Patient. Patient discharged home with instructions regarding supportive care, medications, and reasons to return. The importance of close follow-up was reviewed.     Scribe Disclosure:  I, Suzanna Hawley, am serving as a scribe at 10:15 PM on 5/17/2020 to document services personally performed by Inez Hough MD based on my observations and the provider's statements to me.    [facility] EMERGENCY DEPARTMENT       Inez Hough MD  05/18/20  0736

## 2020-10-08 ENCOUNTER — APPOINTMENT (OUTPATIENT)
Dept: GENERAL RADIOLOGY | Facility: CLINIC | Age: 35
End: 2020-10-08
Attending: EMERGENCY MEDICINE
Payer: COMMERCIAL

## 2020-10-08 ENCOUNTER — HOSPITAL ENCOUNTER (EMERGENCY)
Facility: CLINIC | Age: 35
Discharge: HOME | End: 2020-10-08
Attending: EMERGENCY MEDICINE | Admitting: EMERGENCY MEDICINE
Payer: COMMERCIAL

## 2020-10-08 VITALS
DIASTOLIC BLOOD PRESSURE: 90 MMHG | SYSTOLIC BLOOD PRESSURE: 150 MMHG | HEART RATE: 92 BPM | OXYGEN SATURATION: 97 % | RESPIRATION RATE: 20 BRPM | TEMPERATURE: 97.8 F

## 2020-10-08 DIAGNOSIS — S60.222A CONTUSION OF LEFT HAND, INITIAL ENCOUNTER: ICD-10-CM

## 2020-10-08 PROCEDURE — 73130 X-RAY EXAM OF HAND: CPT | Mod: LT

## 2020-10-08 PROCEDURE — 250N000013 HC RX MED GY IP 250 OP 250 PS 637: Performed by: EMERGENCY MEDICINE

## 2020-10-08 PROCEDURE — 99283 EMERGENCY DEPT VISIT LOW MDM: CPT

## 2020-10-08 RX ORDER — IBUPROFEN 600 MG/1
600 TABLET, FILM COATED ORAL ONCE
Status: COMPLETED | OUTPATIENT
Start: 2020-10-08 | End: 2020-10-08

## 2020-10-08 RX ADMIN — IBUPROFEN 600 MG: 600 TABLET, FILM COATED ORAL at 20:08

## 2020-10-08 NOTE — ED AVS SNAPSHOT
[facility] Emergency Dept  [address]  [facility] [address]  Phone: [phone]  Fax: [phone]                                    Ted Borrero   MRN: [id number]    Department: [facility] Emergency Dept   Date of Visit: 10/8/2020           After Visit Summary Signature Page    I have received my discharge instructions, and my questions have been answered. I have discussed any challenges I see with this plan with the nurse or doctor.    ..........................................................................................................................................  Patient/Patient Representative Signature      ..........................................................................................................................................  Patient Representative Print Name and Relationship to Patient    ..................................................               ................................................  Date                                   Time    ..........................................................................................................................................  Reviewed by Signature/Title    ...................................................              ..............................................  Date                                               Time          22EPIC Rev 08/18

## 2020-10-09 ASSESSMENT — ENCOUNTER SYMPTOMS
FEVER: 0
NUMBNESS: 0
JOINT SWELLING: 1
ARTHRALGIAS: 1
BRUISES/BLEEDS EASILY: 0

## 2020-10-09 NOTE — ED PROVIDER NOTES
History   Chief Complaint:  Hand Injury     The history is provided by the patient.      Ted Borrero is a 35 year old male who is right hand dominant who presents with left hand injury. He was working with a power buffer and working on the dinning room floor. He grabbed it to stop it from spinning and he believes that his left index and middle fingers were caught in the buffer. He notes immediate bruising the fingers which has since improved. He states he does have range of motion but the pain is worse with movement and palpation. He has not had any medication for pain. He is not anticoagulated. He has no other injuries.     Allergies:  No Known Allergies    Medications:   No daily medication.     Past Medical History:    ADD  Colitis   Class 2 obesity   Tobacco use disorder   Depression     Past Surgical History:    The patient does not have any pertinent past surgical history.     Family History:    No past pertinent family history.     Social History:  The patient was unaccompanied to the ED.  Smoking Status: Current Every Day Smoker, 0.25 packs/day  Smokeless Tobacco: Never Used  Alcohol Use: Yes  Drug Use: No    Review of Systems   Constitutional: Negative for fever.   Musculoskeletal: Positive for arthralgias and joint swelling.        Left second and third finger pain.    Neurological: Negative for numbness.   Hematological: Does not bruise/bleed easily.     Physical Exam     Patient Vitals for the past 24 hrs:   BP Temp Temp src Pulse Resp SpO2   10/08/20 1908 128/89 97.8  F (36.6  C) Temporal 110 20 100 %       Physical Exam  Left Upper Ext:  MSK:   No tenderness of the wrist or elbow with full AROM  Injury to digit 2nd and 3rd.  Tender to palpation over the proximal finger and head of metacarpal. No laceration.  5/5 flexion at MCP, PIP and DIP joints.  5/5 extension of MCP, PIP and DIP joints.  No laxity with lateral stress to the MCP, PIP and DIP.  CV: brisk capillary refill in all 5 digits of the  left hand  Neuro: sensation intact to light touch on the pad of the 2nd and 3rd digit, strength exam above.  Skin: no laceration, no surrounding redness    Emergency Department Course     Imaging:  Radiology findings were communicated with the patient who voiced understanding of the findings.    XR Hand Left G/E 3 Views  IMPRESSION: Normal joint spaces and alignment. No fracture.  Reading per radiology     Interventions:   Ibuprofen 600 mg Oral    Emergency Department Course:  Nursing notes and vitals reviewed.    1940 I performed an exam of the patient as documented above.     1945 I rechecked the patient. Explained findings to the Patient.    Findings and plan explained to the Patient. Patient discharged home with instructions regarding supportive care, medications, and reasons to return. The importance of close follow-up was reviewed.     Impression & Plan        Medical Decision Making:  Ted Borrero is a 35 year old male presents for evaluation after injury to left index and middle finger.  Signs and symptoms are consistent with a contusion.  A broad differential was considered including sprain, strain, fracture, tendon rupture, nerve impingement/compromise, referred pain. Supportive outpatient management is indicated.  Rest, ice, and elevation treatment was discussed with the patient.Close follow-up with patient's primary care physician per discharge precautions. Contusion discharge instructions given for home. Justin tape fingers PRN for pain.  Discussed ROM exercises for fingers.    Diagnosis:    ICD-10-CM    1. Contusion of left hand, initial encounter  S60.222A        Disposition:   discharged to home    Scribe Disclosure:  I, Summer Cox, am serving as a scribe at 7:30 PM on 10/8/2020 to document services personally performed by Inez Hough MD based on my observations and the provider's statements to me.   [facility] EMERGENCY DEPARTMENT       Inez Hough MD  10/09/20  0044

## 2020-10-09 NOTE — DISCHARGE INSTRUCTIONS
Take ibuprofen 600 mg 3x per day.  This will provide both pain control and fight against inflammation.    Buddy tape fingers as needed.  Perform gentle range of motion of finger several times per day.

## 2020-11-13 ENCOUNTER — APPOINTMENT (OUTPATIENT)
Dept: CT IMAGING | Facility: CLINIC | Age: 35
End: 2020-11-13
Attending: EMERGENCY MEDICINE
Payer: COMMERCIAL

## 2020-11-13 ENCOUNTER — HOSPITAL ENCOUNTER (OUTPATIENT)
Facility: CLINIC | Age: 35
Setting detail: OBSERVATION
Discharge: HOME | End: 2020-11-14
Attending: EMERGENCY MEDICINE | Admitting: EMERGENCY MEDICINE
Payer: COMMERCIAL

## 2020-11-13 ENCOUNTER — APPOINTMENT (OUTPATIENT)
Dept: MRI IMAGING | Facility: CLINIC | Age: 35
End: 2020-11-13
Attending: STUDENT IN AN ORGANIZED HEALTH CARE EDUCATION/TRAINING PROGRAM
Payer: COMMERCIAL

## 2020-11-13 DIAGNOSIS — R53.1 LEFT-SIDED WEAKNESS: ICD-10-CM

## 2020-11-13 DIAGNOSIS — R41.0 DELIRIUM: ICD-10-CM

## 2020-11-13 DIAGNOSIS — R41.82 ALTERED MENTAL STATUS, UNSPECIFIED ALTERED MENTAL STATUS TYPE: Primary | ICD-10-CM

## 2020-11-13 LAB
ALBUMIN UR-MCNC: NEGATIVE MG/DL
AMPHETAMINES UR QL SCN: POSITIVE
ANION GAP SERPL CALCULATED.3IONS-SCNC: 4 MMOL/L (ref 3–14)
APPEARANCE UR: CLEAR
APTT PPP: 30 SEC (ref 22–37)
BARBITURATES UR QL: NEGATIVE
BASOPHILS # BLD AUTO: 0.1 10E9/L (ref 0–0.2)
BASOPHILS NFR BLD AUTO: 0.6 %
BENZODIAZ UR QL: NEGATIVE
BILIRUB UR QL STRIP: NEGATIVE
BUN SERPL-MCNC: 22 MG/DL (ref 7–30)
CALCIUM SERPL-MCNC: 8.3 MG/DL (ref 8.5–10.1)
CANNABINOIDS UR QL SCN: POSITIVE
CHLORIDE SERPL-SCNC: 109 MMOL/L (ref 94–109)
CO2 SERPL-SCNC: 28 MMOL/L (ref 20–32)
COCAINE UR QL: NEGATIVE
COLOR UR AUTO: NORMAL
CREAT SERPL-MCNC: 0.8 MG/DL (ref 0.66–1.25)
DIFFERENTIAL METHOD BLD: ABNORMAL
EOSINOPHIL # BLD AUTO: 0.2 10E9/L (ref 0–0.7)
EOSINOPHIL NFR BLD AUTO: 2.1 %
ERYTHROCYTE [DISTWIDTH] IN BLOOD BY AUTOMATED COUNT: 12.4 % (ref 10–15)
ETHANOL SERPL-MCNC: <0.01 G/DL
GFR SERPL CREATININE-BSD FRML MDRD: >90 ML/MIN/{1.73_M2}
GLUCOSE BLDC GLUCOMTR-MCNC: 104 MG/DL (ref 70–99)
GLUCOSE SERPL-MCNC: 119 MG/DL (ref 70–99)
GLUCOSE UR STRIP-MCNC: NEGATIVE MG/DL
HCT VFR BLD AUTO: 43.7 % (ref 40–53)
HGB BLD-MCNC: 13.6 G/DL (ref 13.3–17.7)
HGB UR QL STRIP: NEGATIVE
IMM GRANULOCYTES # BLD: 0 10E9/L (ref 0–0.4)
IMM GRANULOCYTES NFR BLD: 0.3 %
INR PPP: 0.93 (ref 0.86–1.14)
KETONES UR STRIP-MCNC: NEGATIVE MG/DL
LEUKOCYTE ESTERASE UR QL STRIP: NEGATIVE
LYMPHOCYTES # BLD AUTO: 2.7 10E9/L (ref 0.8–5.3)
LYMPHOCYTES NFR BLD AUTO: 23.1 %
MCH RBC QN AUTO: 30.6 PG (ref 26.5–33)
MCHC RBC AUTO-ENTMCNC: 31.1 G/DL (ref 31.5–36.5)
MCV RBC AUTO: 98 FL (ref 78–100)
MONOCYTES # BLD AUTO: 0.9 10E9/L (ref 0–1.3)
MONOCYTES NFR BLD AUTO: 8.1 %
NEUTROPHILS # BLD AUTO: 7.6 10E9/L (ref 1.6–8.3)
NEUTROPHILS NFR BLD AUTO: 65.8 %
NITRATE UR QL: NEGATIVE
NRBC # BLD AUTO: 0 10*3/UL
NRBC BLD AUTO-RTO: 0 /100
OPIATES UR QL SCN: NEGATIVE
PCP UR QL SCN: NEGATIVE
PH UR STRIP: 7 PH (ref 5–7)
PLATELET # BLD AUTO: 269 10E9/L (ref 150–450)
POTASSIUM SERPL-SCNC: 4.1 MMOL/L (ref 3.4–5.3)
RBC # BLD AUTO: 4.44 10E12/L (ref 4.4–5.9)
RBC #/AREA URNS AUTO: 1 /HPF (ref 0–2)
SODIUM SERPL-SCNC: 141 MMOL/L (ref 133–144)
SOURCE: NORMAL
SP GR UR STRIP: 1.01 (ref 1–1.03)
TROPONIN I SERPL-MCNC: <0.015 UG/L (ref 0–0.04)
UROBILINOGEN UR STRIP-MCNC: NORMAL MG/DL (ref 0–2)
WBC # BLD AUTO: 11.6 10E9/L (ref 4–11)
WBC #/AREA URNS AUTO: 2 /HPF (ref 0–5)

## 2020-11-13 PROCEDURE — G0426 INPT/ED TELECONSULT50: HCPCS | Mod: GC | Performed by: PSYCHIATRY & NEUROLOGY

## 2020-11-13 PROCEDURE — 96374 THER/PROPH/DIAG INJ IV PUSH: CPT | Mod: 59

## 2020-11-13 PROCEDURE — 81001 URINALYSIS AUTO W/SCOPE: CPT | Mod: XU | Performed by: EMERGENCY MEDICINE

## 2020-11-13 PROCEDURE — 85610 PROTHROMBIN TIME: CPT | Performed by: EMERGENCY MEDICINE

## 2020-11-13 PROCEDURE — 93005 ELECTROCARDIOGRAM TRACING: CPT

## 2020-11-13 PROCEDURE — 255N000002 HC RX 255 OP 636: Performed by: EMERGENCY MEDICINE

## 2020-11-13 PROCEDURE — 250N000013 HC RX MED GY IP 250 OP 250 PS 637: Performed by: INTERNAL MEDICINE

## 2020-11-13 PROCEDURE — 250N000011 HC RX IP 250 OP 636: Performed by: EMERGENCY MEDICINE

## 2020-11-13 PROCEDURE — 250N000009 HC RX 250: Performed by: EMERGENCY MEDICINE

## 2020-11-13 PROCEDURE — 80320 DRUG SCREEN QUANTALCOHOLS: CPT | Performed by: EMERGENCY MEDICINE

## 2020-11-13 PROCEDURE — 85730 THROMBOPLASTIN TIME PARTIAL: CPT | Performed by: EMERGENCY MEDICINE

## 2020-11-13 PROCEDURE — 99285 EMERGENCY DEPT VISIT HI MDM: CPT | Mod: 25

## 2020-11-13 PROCEDURE — 70450 CT HEAD/BRAIN W/O DYE: CPT | Mod: 59

## 2020-11-13 PROCEDURE — G0378 HOSPITAL OBSERVATION PER HR: HCPCS

## 2020-11-13 PROCEDURE — 80048 BASIC METABOLIC PNL TOTAL CA: CPT | Performed by: EMERGENCY MEDICINE

## 2020-11-13 PROCEDURE — 70553 MRI BRAIN STEM W/O & W/DYE: CPT

## 2020-11-13 PROCEDURE — 80307 DRUG TEST PRSMV CHEM ANLYZR: CPT | Performed by: EMERGENCY MEDICINE

## 2020-11-13 PROCEDURE — U0003 INFECTIOUS AGENT DETECTION BY NUCLEIC ACID (DNA OR RNA); SEVERE ACUTE RESPIRATORY SYNDROME CORONAVIRUS 2 (SARS-COV-2) (CORONAVIRUS DISEASE [COVID-19]), AMPLIFIED PROBE TECHNIQUE, MAKING USE OF HIGH THROUGHPUT TECHNOLOGIES AS DESCRIBED BY CMS-2020-01-R: HCPCS | Performed by: EMERGENCY MEDICINE

## 2020-11-13 PROCEDURE — 99220 PR INITIAL OBSERVATION CARE,LEVEL III: CPT | Performed by: INTERNAL MEDICINE

## 2020-11-13 PROCEDURE — 84484 ASSAY OF TROPONIN QUANT: CPT | Performed by: EMERGENCY MEDICINE

## 2020-11-13 PROCEDURE — 85025 COMPLETE CBC W/AUTO DIFF WBC: CPT | Performed by: EMERGENCY MEDICINE

## 2020-11-13 PROCEDURE — A9585 GADOBUTROL INJECTION: HCPCS | Performed by: EMERGENCY MEDICINE

## 2020-11-13 PROCEDURE — 0042T CT HEAD PERFUSION WITH CONTRAST: CPT

## 2020-11-13 PROCEDURE — 70498 CT ANGIOGRAPHY NECK: CPT

## 2020-11-13 PROCEDURE — 999N001017 HC STATISTIC GLUCOSE BY METER IP

## 2020-11-13 PROCEDURE — C9803 HOPD COVID-19 SPEC COLLECT: HCPCS

## 2020-11-13 RX ORDER — ACETAMINOPHEN 650 MG/1
650 SUPPOSITORY RECTAL EVERY 4 HOURS PRN
Status: DISCONTINUED | OUTPATIENT
Start: 2020-11-13 | End: 2020-11-14 | Stop reason: HOSPADM

## 2020-11-13 RX ORDER — PRAZOSIN HYDROCHLORIDE 1 MG/1
1 CAPSULE ORAL AT BEDTIME
Status: ON HOLD | COMMUNITY
End: 2021-04-07

## 2020-11-13 RX ORDER — ONDANSETRON 2 MG/ML
4 INJECTION INTRAMUSCULAR; INTRAVENOUS EVERY 6 HOURS PRN
Status: DISCONTINUED | OUTPATIENT
Start: 2020-11-13 | End: 2020-11-14 | Stop reason: HOSPADM

## 2020-11-13 RX ORDER — HYDROXYZINE HYDROCHLORIDE 50 MG/1
50 TABLET, FILM COATED ORAL 3 TIMES DAILY
COMMUNITY
End: 2020-11-13

## 2020-11-13 RX ORDER — POLYETHYLENE GLYCOL 3350 17 G/17G
17 POWDER, FOR SOLUTION ORAL DAILY
Status: DISCONTINUED | OUTPATIENT
Start: 2020-11-14 | End: 2020-11-14 | Stop reason: HOSPADM

## 2020-11-13 RX ORDER — ACETAMINOPHEN 325 MG/1
650 TABLET ORAL EVERY 4 HOURS PRN
Status: DISCONTINUED | OUTPATIENT
Start: 2020-11-13 | End: 2020-11-14 | Stop reason: HOSPADM

## 2020-11-13 RX ORDER — IOPAMIDOL 755 MG/ML
500 INJECTION, SOLUTION INTRAVASCULAR ONCE
Status: COMPLETED | OUTPATIENT
Start: 2020-11-13 | End: 2020-11-13

## 2020-11-13 RX ORDER — PAROXETINE 10 MG/1
20 TABLET, FILM COATED ORAL DAILY
Status: ON HOLD | COMMUNITY
End: 2021-04-07

## 2020-11-13 RX ORDER — HYDROXYZINE PAMOATE 50 MG/1
50 CAPSULE ORAL 3 TIMES DAILY
Status: ON HOLD | COMMUNITY
End: 2021-04-07

## 2020-11-13 RX ORDER — HYDROXYZINE HYDROCHLORIDE 50 MG/1
50 TABLET, FILM COATED ORAL 3 TIMES DAILY
Status: DISCONTINUED | OUTPATIENT
Start: 2020-11-14 | End: 2020-11-13

## 2020-11-13 RX ORDER — HYDROXYZINE HYDROCHLORIDE 50 MG/1
50 TABLET, FILM COATED ORAL 3 TIMES DAILY
Status: DISCONTINUED | OUTPATIENT
Start: 2020-11-14 | End: 2020-11-14 | Stop reason: HOSPADM

## 2020-11-13 RX ORDER — GADOBUTROL 604.72 MG/ML
10 INJECTION INTRAVENOUS ONCE
Status: COMPLETED | OUTPATIENT
Start: 2020-11-13 | End: 2020-11-13

## 2020-11-13 RX ORDER — AMOXICILLIN 250 MG
1 CAPSULE ORAL 2 TIMES DAILY PRN
Status: DISCONTINUED | OUTPATIENT
Start: 2020-11-13 | End: 2020-11-14 | Stop reason: HOSPADM

## 2020-11-13 RX ORDER — LORAZEPAM 2 MG/ML
1 INJECTION INTRAMUSCULAR ONCE
Status: COMPLETED | OUTPATIENT
Start: 2020-11-13 | End: 2020-11-13

## 2020-11-13 RX ORDER — QUETIAPINE FUMARATE 50 MG/1
150 TABLET, EXTENDED RELEASE ORAL AT BEDTIME
Status: DISCONTINUED | OUTPATIENT
Start: 2020-11-13 | End: 2020-11-14 | Stop reason: HOSPADM

## 2020-11-13 RX ORDER — POLYETHYLENE GLYCOL 3350 17 G/17G
17 POWDER, FOR SOLUTION ORAL DAILY PRN
Status: DISCONTINUED | OUTPATIENT
Start: 2020-11-13 | End: 2020-11-14 | Stop reason: HOSPADM

## 2020-11-13 RX ORDER — AMOXICILLIN 250 MG
2 CAPSULE ORAL 2 TIMES DAILY PRN
Status: DISCONTINUED | OUTPATIENT
Start: 2020-11-13 | End: 2020-11-14 | Stop reason: HOSPADM

## 2020-11-13 RX ORDER — AMOXICILLIN 250 MG
1 CAPSULE ORAL 2 TIMES DAILY
Status: DISCONTINUED | OUTPATIENT
Start: 2020-11-13 | End: 2020-11-14 | Stop reason: HOSPADM

## 2020-11-13 RX ORDER — PRAZOSIN HYDROCHLORIDE 1 MG/1
1 CAPSULE ORAL AT BEDTIME
Status: DISCONTINUED | OUTPATIENT
Start: 2020-11-13 | End: 2020-11-14 | Stop reason: HOSPADM

## 2020-11-13 RX ORDER — ONDANSETRON 4 MG/1
4 TABLET, ORALLY DISINTEGRATING ORAL EVERY 6 HOURS PRN
Status: DISCONTINUED | OUTPATIENT
Start: 2020-11-13 | End: 2020-11-14 | Stop reason: HOSPADM

## 2020-11-13 RX ORDER — PAROXETINE 20 MG/1
20 TABLET, FILM COATED ORAL DAILY
Status: DISCONTINUED | OUTPATIENT
Start: 2020-11-14 | End: 2020-11-14 | Stop reason: HOSPADM

## 2020-11-13 RX ORDER — QUETIAPINE FUMARATE 50 MG/1
150 TABLET, EXTENDED RELEASE ORAL AT BEDTIME
Status: ON HOLD | COMMUNITY
End: 2021-04-07

## 2020-11-13 RX ORDER — AMOXICILLIN 250 MG
2 CAPSULE ORAL 2 TIMES DAILY
Status: DISCONTINUED | OUTPATIENT
Start: 2020-11-13 | End: 2020-11-14 | Stop reason: HOSPADM

## 2020-11-13 RX ORDER — QUETIAPINE FUMARATE 50 MG/1
50 TABLET, FILM COATED ORAL AT BEDTIME
Status: DISCONTINUED | OUTPATIENT
Start: 2020-11-13 | End: 2020-11-14 | Stop reason: HOSPADM

## 2020-11-13 RX ADMIN — SODIUM CHLORIDE 95 ML: 9 INJECTION, SOLUTION INTRAVENOUS at 16:58

## 2020-11-13 RX ADMIN — GADOBUTROL 10 ML: 604.72 INJECTION INTRAVENOUS at 18:43

## 2020-11-13 RX ADMIN — HYDROXYZINE HYDROCHLORIDE 50 MG: 50 TABLET, FILM COATED ORAL at 23:38

## 2020-11-13 RX ADMIN — QUETIAPINE FUMARATE 150 MG: 50 TABLET, EXTENDED RELEASE ORAL at 23:38

## 2020-11-13 RX ADMIN — ACETAMINOPHEN 650 MG: 325 TABLET, FILM COATED ORAL at 23:38

## 2020-11-13 RX ADMIN — LORAZEPAM 1 MG: 2 INJECTION INTRAMUSCULAR; INTRAVENOUS at 18:16

## 2020-11-13 RX ADMIN — PRAZOSIN HYDROCHLORIDE 1 MG: 1 CAPSULE ORAL at 23:38

## 2020-11-13 RX ADMIN — IOPAMIDOL 120 ML: 755 INJECTION, SOLUTION INTRAVENOUS at 16:58

## 2020-11-13 ASSESSMENT — MIFFLIN-ST. JEOR: SCORE: 2099.76

## 2020-11-13 ASSESSMENT — ENCOUNTER SYMPTOMS
CONFUSION: 1
DIZZINESS: 1
HEADACHES: 0

## 2020-11-13 NOTE — ED TRIAGE NOTES
One hour ago sudden onset left sided weakness and drooping left sided face. Confusion. Unable to walk without a lot of help. ABCs intact.

## 2020-11-13 NOTE — ED PROVIDER NOTES
History     Chief Complaint:  Neurologic Problem      HPI   Ted Borrero is a 35 year old male who presents with his significant other for the evaluation of neurologic problem. The significant other reports that the patient started to act confused at 1545 this afternoon and that she found him at that time coming out of the bathroom after vomiting, prompting them to the ED. She states that she was notified the patient was not acting right by their kids. She describes this confusion as the patient not being able to answer her questions correctly and mumbling. She also notes that the patient had left sided facial droop when she fist saw him and that his left wrist was limp when she had him raise both of his arms. These symptoms have relieved upon arrival to the ED. Upon my evaluation, the patient states that he was relaxing at home and suddenly started to experience slight dizziness, bilateral arm numbness, a sudden pain is his left bicep, and a metallic taste in his mouth. He denies these symptoms currently. The significant other states that the patient was acting his normal self up till symptoms start today. The patient denies headache and other issues.  Per chart review, the patient has a history of untreated hypertension.     Allergies:  No known drug allergies     Medications:    The patient is not currently taking any prescribed medications.     Past Medical History:    ADD  Colitis  Leukocytosis  Class 2 obesity  Hypertension  Elevated blood pressure    Past Surgical History:    History reviewed. No pertinent surgical history.     Family History:    History reviewed. No pertinent family history.      Social History:  Smoking status: Current every day smoker, PPD: 0.25  Alcohol use: Yes   Drug use: No  PCP: No Ref-Primary, Physician   Marital Status:  Single [1]     Review of Systems   Neurological: Positive for dizziness. Negative for headaches.   Psychiatric/Behavioral: Positive for confusion.   All other  systems reviewed and are negative.        Physical Exam     Patient Vitals for the past 24 hrs:   BP Temp Temp src Pulse Resp SpO2 Weight   11/13/20 2100 (!) 144/82 -- -- 84 -- 99 % --   11/13/20 2000 135/80 -- -- 94 -- 99 % --   11/13/20 1800 (!) 143/76 -- -- 87 19 98 % --   11/13/20 1745 (!) 138/96 -- -- 98 14 99 % --   11/13/20 1730 136/82 -- -- 90 21 100 % --   11/13/20 1715 (!) 144/91 -- -- 97 -- 97 % --   11/13/20 1701 -- 98.6  F (37  C) Oral -- -- -- --   11/13/20 1655 -- -- -- -- -- 99 % --   11/13/20 1652 -- -- -- -- -- -- 109 kg (240 lb 4.8 oz)   11/13/20 1650 (!) 160/128 -- -- 115 -- -- --      Physical Exam  GEN: alert and orientated x3    HEAD: atraumatic    EYES: pupils reactive, extraocular muscles intact, conjunctivae normal    ENT: Moist oral mucosa, oral pharynx clear; nose clear    NECK: Normal ROM, trachea midline    RESPIRATORY: no tachypnea, normal work of breathing, breath sounds clear to auscultation    CVS: normal S1/S2, no murmurs/rubs/gallops    ABDOMEN: soft, nontender, no masses or organomegaly, no rebound, positive bowel sounds    MUSCULOSKELETAL: no deformities    SKIN: warm and dry, no acute rashes or ulceration, no erythema     NEURO: Alert, mild dysarthria. No facial droop on smile. He has no palmar drift. Partial antigravity of the LLE which compares weak to the right. Mild left sided spatial inattention as he has problems also with finger nose finger using the left finger, no problems on the right.     LYMPH: no lymphadenopathy    PSYCHE:  Normal affect     Emergency Department Course   ECG (16:50:59):  Rate 104 bpm. ME interval 146. QRS duration 94. QT/QTc 340/447. P-R-T axes 55 -10 24. Sinus tachycardia. Incomplete right bundle branch block. Borderline ECG. Interpreted at 1655 by Michoacano Harper MD.     Imaging:  Radiographic findings were communicated with the patient and family who voiced understanding of the findings.  CT Head w/o Contrast   IMPRESSION:  1.  No acute  intracranial brain without significant CT abnormality.  2. Results called to Dr. Harper at 11/13/2020 5:15 PM  As read by Radiology.    CT Head Perfusion w Contrast   CT PERFUSION:  1.  Normal cerebral perfusion.  As read by Radiology.    CTA Head Neck w Contrast   IMPRESSION:   HEAD CTA:   1.  Normal CTA [place].  NECK CTA:  1.  Normal neck CTA.  As read by Radiology.    MR Brain w and w/o Contrast  IMPRESSION:  1. No findings to explain patient's symptoms. No acute infarct.  2. Minimal nonspecific cerebral white matter T2 hyperintensities.  As read by Radiology.     Laboratory:  BMP: Glucose 119 (H), Calcium 8.3 (L), WNL (Creatinine 0.80)  CBC: WBC 11.6 (H), WNL (HGB 13.6, )  INR: 0.93  Partial thromboplastin time: 30  Troponin (1650): <0.015   Glucose by meter: 104 (H)    Drug abuse screen 77 urine: Amphetamine Positive, Cannabinoids Positive    Asymptomatic COVID-19 Virus (Coronavirus) by PCR Nasopharyngeal swab: Pending     Interventions:  1816, Ativan, 1 mg, IV    Emergency Department Course:  Past medical records, nursing notes, and vitals reviewed.  1644: I performed an exam of the patient and obtained history, as documented above.     1650: Code stroke called.     IV inserted and blood drawn.     1652: I spoke with Dr. Yenifer Dominguez of stroke neurology.     The patient was sent for a head, head perfusion, and head/neck CT and brain MRI while in the emergency department, findings above.    1710: I rechecked the patient. Explained findings to patient and wife.     1718: I spoke with radiology over the phone.     1732: I spoke with unit clerk to page for CT results.     1741: I spoke with radiology over the phone.     1833: I spoke with Dr. Yenifer Dominguez of stroke neurology.    2037: I rechecked the patient. Explained findings to patient and wife.     Findings and plan explained to the Patient who consents to admission. Discussed the patient with LOUISE GARIBAY for Dr. Rajan, who will admit the patient  to an observation bed for further monitoring, evaluation, and treatment.     Impression & Plan    CMS Diagnoses: The patient has stroke symptoms:         ED Stroke specific documentation           NIHSS PDF     Patient last known well time: 1545  ED Provider first to bedside at: 1642  CT Results received at: Head CT: 1828, Head Perfusion CT: 1751, and Head Neck CTA: 1751    tPA:   No findings of CVA on advanced imaging studies.    If treating with tPA: Ensure SBP<185 and DBP<105 prior to treatment with IV tPA.  Administering IV tPA after treatment with IV labetalol, hydralazine, or nicardipine is reasonable once BP control is established.    Endovascular Retrieval:  Not initiated due to absence of proximal vessel occlusion    National [employer] Stroke Scale (Baseline)  Time Performed: 1642     Score    Level of consciousness: (0)   Alert, keenly responsive    LOC questions: (0)   Answers both questions correctly    LOC commands: (0)   Performs both tasks correctly    Best gaze: (0)   Normal    Visual: (0)   No visual loss    Facial palsy: (0)   Normal symmetrical movements    Motor arm (left): (0)   No drift    Motor arm (right): (0)   No drift    Motor leg (left): (1)    Drift with partial antigravity)       Motor leg (right): (0)   No drift    Limb ataxia: (1)   Present in one limb    Sensory: (1)   Mild to moderate sensory loss    Best language: (0)   No aphasia    Dysarthria: (1)   Mild to moderate dysarthria    Extinction and inattention: (1)   Visual, tacile, auditory, spatial, person inattention        Total Score:  4        Stroke Mimics were considered (including migraine headache, seizure disorder, hypoglycemia (or hyperglycemia), head or spinal trauma, CNS infection, Toxin ingestion and shock state (e.g. sepsis) .    Medical Decision Making:  The patient is 35-year-old right-hand-dominant male who presents with left-sided weakness and reports of a left facial droop at home, difficulty  ambulating, and slurred speech.  A code stroke was called however CT head and CT angiogram CT perfusion were normal.  I spoke with the neuro critical care stroke specialist who recommended rapid MRI diffusion imaging which also was negative.    There are some concern with the patient's unusual sensory complaints of intermittent metallic taste, visual scotoma, as well as intermittent tremors of the left upper extremity that he may be having an atypical seizure such as a temporal lobe seizure.  Patient is being admitted with recommendations from stroke critical care that an EEG be done as the patient is still symptomatic, although his symptoms and findings of weakness have improved.    I am suspicious though that there may be polysubstance abuse involved.  Patient denied any recreational drugs of any sort but then did admit to THC.  I also asked if he was still taking Adderall and he stated he had not taken them years but he has a positive screen for amphetamines.  He did seem quite anxious as well but I thought that might have been related to his concerned about his symptomology.  He did receive Ativan prior to his MRI for claustrophobia.  I spoke with the hospitalist who has accepted him to observation    Critical Care Time:  40 minutes    Diagnosis:    ICD-10-CM    1. Left-sided weakness  R53.1 UA with Microscopic   2. Delirium  R41.0        Disposition:  Admitted to an observation bed.    Scribe Disclosure:  I, Michoacano Schroeder, am serving as a scribe at 4:48 PM on 11/13/2020 to document services personally performed by Michoacano Harper MD based on my observations and the provider's statements to me.      Michoacano Schroeder  11/13/2020   [facility] EMERGENCY DEPT       Michoacano Harper MD  11/14/20 0049

## 2020-11-14 VITALS
SYSTOLIC BLOOD PRESSURE: 129 MMHG | DIASTOLIC BLOOD PRESSURE: 69 MMHG | OXYGEN SATURATION: 99 % | TEMPERATURE: 96.4 F | RESPIRATION RATE: 16 BRPM | BODY MASS INDEX: 36.25 KG/M2 | HEART RATE: 76 BPM | WEIGHT: 253.2 LBS | HEIGHT: 70 IN

## 2020-11-14 LAB
LABORATORY COMMENT REPORT: NORMAL
SARS-COV-2 RNA SPEC QL NAA+PROBE: NEGATIVE
SARS-COV-2 RNA SPEC QL NAA+PROBE: NORMAL
SPECIMEN SOURCE: NORMAL
SPECIMEN SOURCE: NORMAL

## 2020-11-14 PROCEDURE — 250N000013 HC RX MED GY IP 250 OP 250 PS 637: Performed by: INTERNAL MEDICINE

## 2020-11-14 PROCEDURE — 99217 PR OBSERVATION CARE DISCHARGE: CPT | Performed by: PHYSICIAN ASSISTANT

## 2020-11-14 PROCEDURE — G0378 HOSPITAL OBSERVATION PER HR: HCPCS

## 2020-11-14 RX ADMIN — HYDROXYZINE HYDROCHLORIDE 50 MG: 50 TABLET, FILM COATED ORAL at 08:40

## 2020-11-14 RX ADMIN — PAROXETINE HYDROCHLORIDE 20 MG: 20 TABLET, FILM COATED ORAL at 08:40

## 2020-11-14 RX ADMIN — DOCUSATE SODIUM 50 MG AND SENNOSIDES 8.6 MG 2 TABLET: 8.6; 5 TABLET, FILM COATED ORAL at 08:40

## 2020-11-14 RX ADMIN — POLYETHYLENE GLYCOL 3350 17 G: 17 POWDER, FOR SOLUTION ORAL at 08:40

## 2020-11-14 NOTE — ED NOTES
[facility]  ED Nurse Handoff Report    Ted Borrero is a 35 year old male   ED Chief complaint: Neurologic Problem  . ED Diagnosis:   Final diagnoses:   Left-sided weakness   Delirium     Allergies: No Known Allergies    Code Status: Please review  Activity level - Baseline/Home:  Independent. Activity Level - Current:   Assist X 1. Lift room needed: No. Bariatric: No   Needed: No   Isolation: Yes. Infection: Not Applicable  MRSA.     Vital Signs:   Vitals:    11/13/20 1745 11/13/20 1800 11/13/20 2000 11/13/20 2100   BP: (!) 138/96 (!) 143/76 135/80 (!) 144/82   Pulse: 98 87 94 84   Resp: 14 19     Temp:       TempSrc:       SpO2: 99% 98% 99% 99%   Weight:           Cardiac Rhythm:  ,      Pain level:    Patient confused: Yes-- intermittently. Not currently confused. Patient Falls Risk: Yes.   Elimination Status: Has voided   Patient Report - Initial Complaint: Patient presents to ED for evaluation of left sided weakness, dysarthria, and confusion. Patient was found on the bathroom floor by his family prior to arrival. He states that he was started on a few new medicines over the past two weeks. A code stroke was called and de-escalated by neurology. Patient will be getting an EEG tomorrow to evaluate for seizures. Focused Assessment: persistent left sided weakness, however confusion and speech have resolved.   Tests Performed: MRI, CT, labs, UA. Abnormal Results:   Labs Ordered and Resulted from Time of ED Arrival Up to the Time of Departure from the ED   GLUCOSE BY METER - Abnormal; Notable for the following components:       Result Value    Glucose 104 (*)     All other components within normal limits   BASIC METABOLIC PANEL - Abnormal; Notable for the following components:    Glucose 119 (*)     Calcium 8.3 (*)     All other components within normal limits   CBC WITH PLATELETS DIFFERENTIAL - Abnormal; Notable for the following components:    WBC 11.6 (*)     MCHC 31.1 (*)     All  other components within normal limits   DRUG ABUSE SCREEN 77 URINE (FL, RH, SH) - Abnormal; Notable for the following components:    Amphetamine Qual Urine Positive (*)     Cannabinoids Qual Urine Positive (*)     All other components within normal limits   INR   PARTIAL THROMBOPLASTIN TIME   TROPONIN I   ALCOHOL ETHYL   ROUTINE UA WITH MICROSCOPIC   PULSE OXIMETRY NURSING   CARDIAC CONTINUOUS MONITORING     MR Brain w/o & w Contrast   Final Result   IMPRESSION:   1. No findings to explain patient's symptoms. No acute infarct.   2. Minimal nonspecific cerebral white matter T2 hyperintensities.      CT Head Perfusion w Contrast   Final Result   IMPRESSION:    HEAD CTA:    1.  Normal CTA [place].      NECK CTA:   1.  Normal neck CTA.      CT PERFUSION:   1.  Normal cerebral perfusion.      Results called to Dr. Harper 11/13/2020 5:42 PM      CTA Head Neck with Contrast   Final Result   IMPRESSION:    HEAD CTA:    1.  Normal CTA [place].      NECK CTA:   1.  Normal neck CTA.      CT PERFUSION:   1.  Normal cerebral perfusion.      Results called to Dr. Harper 11/13/2020 5:42 PM      CT Head w/o Contrast   Final Result   IMPRESSION:   1.  No acute intracranial brain without significant CT abnormality.   2. Results called to Dr. Harper at 11/13/2020 5:15 PM        Treatments provided: see MAR  Family Comments: no longer present  OBS brochure/video discussed/provided to patient:  Yes  ED Medications:   Medications   iopamidol (ISOVUE-370) solution 500 mL (120 mLs Intravenous Given 11/13/20 1658)   CT Scan Flush (95 mLs Intravenous Given 11/13/20 1658)   LORazepam (ATIVAN) injection 1 mg (1 mg Intravenous Given 11/13/20 1816)   gadobutrol (GADAVIST) injection 10 mL (10 mLs Intravenous Given 11/13/20 1843)     Drips infusing:  No  For the majority of the shift, the patient's behavior Green. Interventions performed were NA.    Sepsis treatment initiated: No     Patient tested for COVID 19 prior to admission:  YES    ED Nurse Name/Phone Number: Elyse Castillo RN,   9:52 PM    RECEIVING UNIT ED HANDOFF REVIEW    Above ED Nurse Handoff Report was reviewed: Yes  Reviewed by: Manuel Whalen RN on November 13, 2020 at 10:18 PM

## 2020-11-14 NOTE — PHARMACY-ADMISSION MEDICATION HISTORY
Admission medication history interview status for this patient is complete. See [facility] admission navigator for allergy information, prior to admission medications and immunization status.     Medication history interview done via telephone during Covid-19 pandemic, indicate source(s): Patient  Medication history resources (including written lists, pill bottles, clinic record):None  Pharmacy: [facility]     Changes made to PTA medication list:  Added: hydroxyzine, prazosin  Deleted: dexamethasone, ibuprofen, zofran, oxycodone, percocet, vancomycin  Changed: seroquel formulation/dose, paroxetine dose/sig    Actions taken by pharmacist (provider contacted, etc):None     Additional medication history information:None    Medication reconciliation/reorder completed by provider prior to medication history?  N   (Y/N)     Prior to Admission medications    Medication Sig Last Dose Taking? Auth Provider   hydrOXYzine (VISTARIL) 50 MG capsule Take 50 mg by mouth 3 times daily 11/12/2020 at Unknown time Yes Unknown, Entered By History   PARoxetine (PAXIL) 10 MG tablet Take 20 mg by mouth daily  11/12/2020 at Unknown time Yes Reported, Patient   prazosin (MINIPRESS) 1 MG capsule Take 1 mg by mouth At Bedtime 11/12/2020 at Unknown time Yes Unknown, Entered By History   QUEtiapine (SEROQUEL XR) 50 MG TB24 24 hr tablet Take 150 mg by mouth At Bedtime 11/12/2020 at Unknown time Yes Unknown, Entered By History

## 2020-11-14 NOTE — PLAN OF CARE
PRIMARY DIAGNOSIS: Left sided weakness  OUTPATIENT/OBSERVATION GOALS TO BE MET BEFORE DISCHARGE:  1. ADLs back to baseline: No    2. Activity and level of assistance: Up with standby assistance.    3. Pain status: Improved with use of alternative comfort measures i.e.: distraction using pts own laptop    4. Return to near baseline physical activity: No, per pt he feels weak     Discharge Planner Nurse   Safe discharge environment identified: Yes  Barriers to discharge: Yes       Entered by: Manuel Whalen 11/14/2020 4:18 AM     Please review provider order for any additional goals.   Nurse to notify provider when observation goals have been met and patient is ready for discharge.    Vitals are Temp: 98.1  F (36.7  C) Temp src: Oral BP: 97/44 Pulse: 75   Resp: 20 SpO2: 97 %.  Patient is Alert and Oriented x4. They are SBA with Gait Belt.  Patient is on Contact precuations for MRSA.  Pt is a Regular diet.  They are complaining of pain in their head.  Patient is [place] locked. Left sided arm weakness improving, chills and shaking, left leg weaker than right with dorsal and plantar flexion. PERRLA. Will cont to monitor.

## 2020-11-14 NOTE — PLAN OF CARE
ROOM # 206-2    Living Situation (if not independent, order SW consult[place]   Facility name:  : [facility] [phone]    Activity level at baseline: Ind  Activity level on admit: SBA      Patient registered to observation; given Patient Bill of Rights; given the opportunity to ask questions about observation status and their plan of care.  Patient has been oriented to the observation room, bathroom and call light is in place.    Discussed discharge goals and expectations with patient/family.

## 2020-11-14 NOTE — CONSULTS
"[facility]    Stroke Consult Note    Reason for Consult: Stroke Code    Chief Complaint: Neurologic Problem      HPI  Ted Borrero is a 35 year old male with history of anxiety, ADD, and unexplained night sweats and episodic leukocytosis who presents as a stroke code for acute onset left sided symptoms. He reportedly had acute onset nausea and vomiting followed by left arm clumsiness and a limp of the left leg. He reports feeling \"off\" since the day prior but he is not able to fully explain how. Since the onset of the arm and leg weakness he has had a pulsating feeling in his head and on two occasions since onset of symptoms he has had a metallic taste in his mouth. He reports taking his prescribed meds as prescribed, but his wife proposes he may have missed doses and then escalated to the next step on his titration schedule without increasing gradually. Those medications include quetiapine, hydroxyzine, paroxetine, and prazosin.    TPA Treatment   Not given due to hyperacute MRI confirmed no restricted diffusion.    Endovascular Treatment  Not initiated due to absence of proximal vessel occlusion    Impression  L hemiataxia/weakness and additional atypical constellation of symptoms, not explained by cerebral ischemia or stroke due to negative MRI-    Recommendations  No further cerebrovascular evaluation  Tox screen  Gen Neuro consult for possible seizure    Patient Follow-up    - Per General Neurology    Thank you for this consult. No further stroke evaluation is recommended, so we will sign off. Please contact us with any additional questions.    The Stroke Staff is Dr. Dejesus.    Yenifer Dominguez MD  Vascular Neurology Fellow  To page me or covering stroke neurology team member, click here: AMCOM   Choose \"On Call\" tab at top, then search dropdown box for \"Neurology Adult\", select location, press Enter, then look for stroke/neuro " ICU/telestroke.    ______________________________________________________    Past Medical History   Past Medical History:   Diagnosis Date     ADD (attention deficit disorder)      Past Surgical History   No past surgical history on file.  Medications   Home Meds  Prior to Admission medications    Medication Sig Start Date End Date Taking? Authorizing Provider   dexamethasone (DECADRON) 6 MG tablet Take 1 tablet (6 mg) by mouth daily  Patient not taking: Reported on 2/12/2020 11/1/19   Fran Portillo MD   ibuprofen (ADVIL/MOTRIN) 200 MG tablet Take 3 tablets (600 mg) by mouth every 6 hours as needed for pain  Patient not taking: Reported on 2/12/2020 7/31/19   Ian Mejia MD   ibuprofen (ADVIL/MOTRIN) 600 MG tablet Take 1 tablet (600 mg) by mouth every 6 hours as needed for moderate pain  Patient not taking: Reported on 2/12/2020 10/28/19   Tio Nicole MD   ondansetron (ZOFRAN) 4 MG tablet Take 1 tablet (4 mg) by mouth every 6 hours as needed for nausea  Patient not taking: Reported on 2/12/2020 7/30/19   Adali Sanchez PA-C   oxyCODONE (ROXICODONE) 5 MG tablet Take 1-2 tablets (5-10 mg) by mouth every 6 hours as needed for moderate to severe pain  Patient not taking: Reported on 2/12/2020 7/30/19   Adali Sanchez PA-C   oxyCODONE-acetaminophen (PERCOCET) 5-325 MG tablet Take 1-2 tablets by mouth every 6 hours as needed for pain  Patient not taking: Reported on 2/12/2020 7/31/19   Ian Mejia MD   tiZANidine (ZANAFLEX) 2 MG tablet Take 1 tablet (2 mg) by mouth 3 times daily  Patient not taking: Reported on 2/12/2020 10/28/19   Tio Nicole MD   vancomycin (VANCOCIN) 125 MG capsule Take 1 capsule (125 mg) by mouth 4 times daily  Patient not taking: Reported on 2/12/2020 7/30/19   Adali Sanchez PA-C       Scheduled Meds      Infusion Meds      PRN Meds      Allergies   No Known Allergies  Family History   No family history on file.  Social  History   Social History     Tobacco Use     Smoking status: Current Every Day Smoker     Packs/day: 0.25     Smokeless tobacco: Never Used   Substance Use Topics     Alcohol use: Yes     Comment: occasional     Drug use: No       Review of Systems   The 10 point Review of Systems is negative other than noted in the HPI or here.        PHYSICAL EXAMINATION  Temp:  [98.6  F (37  C)] 98.6  F (37  C)  Pulse:  [115] 115  BP: (160)/(128) 160/128  SpO2:  [99 %] 99 %     Neurologic  Mental Status:  alert, oriented x 3, follows commands, speech clear and fluent, naming and repetition normal  Cranial Nerves:  visual fields intact (tested by nurse), EOMI with normal smooth pursuit, facial sensation intact and symmetric (tested by nurse), facial movements symmetric, hearing not formally tested but intact to conversation, no dysarthria, shoulder shrug equal bilaterally, tongue protrusion midline  Motor:  no abnormal movements, RUE and RLE no drift, LUE and LLE leigh/appear ataxic but return to original height and do not hit bed, more effortful to maintain elevation  Reflexes:  unable to test (telestroke)  Sensory:  light touch sensation intact and symmetric throughout upper and lower extremities (assessed by nurse), extinguishes double simultaneous stimulation on L arm and leg  Coordination:  normal finger-to-nose and heel-to-shin on R without dysmetria, ataxic on L, rapid alternating movements symmetric  Station/Gait:  unable to test due to telestroke      Dysphagia Screen  Per Nursing    Stroke Scales    NIHSS  Interval baseline (11/13/20 1700)   Interval Comments     1a. Level of Consciousness 0-->Alert, keenly responsive   1b. LOC Questions 0-->Answers both questions correctly   1c. LOC Commands 0-->Performs both tasks correctly   2.   Best Gaze 0-->Normal   3.   Visual 0-->No visual loss   4.   Facial Palsy 0-->Normal symmetrical movements   5a. Motor Arm, Left 1-->Drift, limb holds 90 (or 45) degrees, but drifts down before  full 10 seconds, does not hit bed or other support   5b. Motor Arm, Right 0-->No drift, limb holds 90 (or 45) degrees for full 10 secs   6a. Motor Leg, Left 2-->Some effort against gravity, leg falls to bed by 5 secs, but has some effort against gravity   6b. Motor Leg, right 0-->No drift, leg holds 30 degree position for full 5 secs   7.   Limb Ataxia 2-->Present in two limbs   8.   Sensory 0-->Normal, no sensory loss   9.   Best Language 0-->No aphasia, normal   10. Dysarthria 0-->Normal   11. Extinction and Inattention  1-->Visual, tactile, auditory, spatial, or personal inattention or extinction to bilateral simultaneous stimulation in one of the sensory modalities   Total 5 (11/13/20 1813)       Imaging  I personally reviewed all imaging; relevant findings per HPI.     Lab Results Data   CBC  Recent Labs   Lab 11/13/20  1650   WBC 11.6*   RBC 4.44   HGB 13.6   HCT 43.7        Basic Metabolic Panel    Recent Labs   Lab 11/13/20  1650      POTASSIUM 4.1   CHLORIDE 109   CO2 28   BUN 22   CR 0.80   *   CHERYL 8.3*     Liver Panel  No results for input(s): PROTTOTAL, ALBUMIN, BILITOTAL, ALKPHOS, AST, ALT, BILIDIRECT in the last 168 hours.  INR    Recent Labs   Lab Test 11/13/20  1650   INR 0.93      Lipid Profile  No lab results found.  A1C  No lab results found.  Troponin I    Recent Labs   Lab 11/13/20  1650   TROPI <0.015          Stroke Code / Stroke Consult Data Data   Stroke Code Data  (for stroke code with tele)  Stroke code activated 11/13/20   1651   First stroke provider response 11/13/20   1651   Video start time 11/13/20   1715   Video end time 11/13/20   1753   Last known normal 11/13/20   1529   Time of discovery  (or onset of symptoms)  11/13/20   1530   Head CT read by me 11/13/20   1705   Was stroke code de-escalated? Yes 11/13/20 1834  symptoms not likely caused by stroke        Telestroke Service Details  Type of service telemedicine diagnostic assessment of acute neurological  changes   Reason telemedicine is appropriate patient requires assessment with a specialist for diagnosis and treatment of neurological symptoms   Mode of transmission secure interactive audio and video communication per [place]org[place]   Originating site (patient location) [facility]    Distant site (provider location) [facility]
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
## 2020-11-14 NOTE — PROGRESS NOTES
OBSERVATION patient END time: 1440    Patient's After Visit Summary was reviewed with patient.   Patient verbalized understanding of After Visit Summary, recommended follow up and was given an opportunity to ask questions.   Discharge medications sent home with patient/family: Not applicable   Discharged with spouse

## 2020-11-14 NOTE — PLAN OF CARE
PRIMARY DIAGNOSIS: Left sided weakness  OUTPATIENT/OBSERVATION GOALS TO BE MET BEFORE DISCHARGE:  ADLs back to baseline: No    Activity and level of assistance: Up with standby assistance.    Pain status: Improved with use of alternative comfort measures i.e.: distraction using pts own laptop    Return to near baseline physical activity: No, per pt he feels weak     Discharge Planner Nurse   Safe discharge environment identified: Yes  Barriers to discharge: Yes       Entered by: Manuel Whalen 11/13/2020 10:51 PM     Please review provider order for any additional goals.   Nurse to notify provider when observation goals have been met and patient is ready for discharge.    Vitals are Temp: 97.6  F (36.4  C) Temp src: Oral BP: 123/70 Pulse: 72   Resp: 20 SpO2: 99 %.  Patient is Alert and Oriented x4. They are SBA with Gait Belt.  Patient is on Contact precuations for MRSA.  Pt is a Regular diet.  They are complaining of pain in their head.  Patient is Saline locked. Left sided arm weakness, pt reports facial twitching, none seen by writer, left leg weaker than right with dorsal and plantar flexion. PERRLA. Will cont to monitor.

## 2020-11-14 NOTE — CONSULTS
Patient Name: Ted Borrero  MRN: [id number]  : 1985  Visit Date: 2020    Reason for Consult: Left-sided weakness along with altered mental status.    History  Mr. Borrero is a 35 year old who was admitted on 2020 after he developed an episode of confusion and left-sided weakness.  This started around 4 PM on .  His partner found him in the bathroom and he was vomiting and is very confused and mumbling.  When he came to the emergency room and his symptoms resolved.  He was noted to have some left-sided facial droop and left arm weakness.  He describes the episode of feeling dizzy and having a metallic taste in his mouth.  He also had some chest discomfort and just an overall very weird feeling.  His breathing was also off and had some jerking-like activity.  He did not have any headache initially when the symptoms started but later did some developed some headache that he describes as a head pressure that has been on and off.  He also developed some hallucinations during this episode he felt like he was melting into the bed.  He says he was cleaning out his garage and there may have been some methamphetamines that he may have interacted with but he denies taking any orally.  Stroke neurology was consulted and MRI brain was completed.  No acute infarct was noted.  His lab tests were suggestive of methamphetamine and marijuana abuse.  Problem List:   Patient Active Problem List   Diagnosis     Colitis     Delirium     Left-sided weakness       Past Medical History:  Past Medical History:   Diagnosis Date     ADD (attention deficit disorder)      Past Surgical History:  No past surgical history on file.  Medications:  No current outpatient medications on file.     Allergies:  No Known Allergies  Family History:Not aware of any relevant neurological history.   Social History:  Social History     Socioeconomic History     Marital status: Single     Spouse name: Not on file      "Number of children: Not on file     Years of education: Not on file     Highest education level: Not on file   Occupational History     Not on file   Social Needs     Financial resource strain: Not on file     Food insecurity     Worry: Not on file     Inability: Not on file     Transportation needs     Medical: Not on file     Non-medical: Not on file   Tobacco Use     Smoking status: Current Every Day Smoker     Packs/day: 0.25     Smokeless tobacco: Never Used   Substance and Sexual Activity     Alcohol use: Yes     Comment: occasional     Drug use: No     Sexual activity: Yes   Lifestyle     Physical activity     Days per week: Not on file     Minutes per session: Not on file     Stress: Not on file   Relationships     Social connections     Talks on phone: Not on file     Gets together: Not on file     Attends Taoist service: Not on file     Active member of club or organization: Not on file     Attends meetings of clubs or organizations: Not on file     Relationship status: Not on file     Intimate partner violence     Fear of current or ex partner: Not on file     Emotionally abused: Not on file     Physically abused: Not on file     Forced sexual activity: Not on file   Other Topics Concern     Not on file   Social History Narrative     Not on file       REVIEW OF SYSTEMS:  A 10 + comprehensive review of systems was performed and negative except what was mentioned in the HPI.    Vital Signs:  /71   Pulse 77   Temp 97.5  F (36.4  C) (Oral)   Resp 18   Ht 1.778 m (5' 10\")   Wt 114.9 kg (253 lb 3.2 oz)   SpO2 100%   BMI 36.33 kg/m      PHYSICAL EXAM:  Constitutional: Patient is well developed, appears stated age, and in no distress.  Eyes: Normal conjunctivae and lids.  Ears, Nose, Mouth and Throat: Normocephalic and atraumatic  Neck: No swelling, freely movable.  Skin: No rashes or abnormal lesions seen.  Psych: Normal mood and affect.  Respiratory: No respiratory distress. Breathing is non " labored.  CVS: No edema noted.    Neurological examination  Mental Status: The patient is alert and oriented. Recent memory is normal. The person is  attentive with normal concentration. Language is fluent. Speech is of normal bonita and  character. The speech is nondysarthric. Fund of knowledge appears normal  Cranial Nerve I: Not tested.  Cranial Nerve II: The pupils are 3 mm, equally round and reactive to light.  Cranial Nerves III, IV, VI: The extraocular movements are full in all directions of gaze without  ophthalmoplegia or nystagmus.  Cranial Nerve V: Light touch is intact and symmetric in the V1, V2, V3 divisions of both  trigeminal nerves.  Cranial Nerve VII: Facial movements are symmetric.  Cranial Nerve XI: Sternocleidomastoid strength is 5/5 bilaterally with normal shoulder shrug.  Motor: Tone is normal in all four extremities without fasciculations, atrophy or myoclonus.  There are no involuntary movements.  Muscle Strength: The strength was 5/5 in upper and lower extremities bilaterally.  Reflexes: The reflexes are 2/4 for biceps, triceps, patellar tendon reflexes bilaterally and  symmetrically.  Sensory: The sensory examination is normal for light touch bilaterally and symmetrically.        Impression and Plan:  Mr. Borrero is a 35 year old With a past medical history of ADHD, hypertension who was admitted on November 13, 2020 for an episode of confusion and left-sided weakness.  His MRI brain was completed and no stroke was found.  His symptoms could be related to possible drug abuse, seizure or atypical migraine but more likely due to drug abuse since he was also having hallucinations.Plan for EEG to check for any epileptiform activity.  He would prefer this be done as outpatient which would be reasonable.  I have given him my clinic information or he can call and schedule the EEG and follow-up with me afterwards.      Joselyn Casey MD  Neurology    Thank you very much for allowing me to  participate in the care of this patient.    Issues addressed during this period included: Pathophysiology of the disease, lab/Imaging  results, overall management, support of the patient and family, medication counseling or  monitoring (including adverse effects and risks of medications) and future testing/work up.

## 2020-11-14 NOTE — PLAN OF CARE
PRIMARY DIAGNOSIS: Left sided weakness  OUTPATIENT/OBSERVATION GOALS TO BE MET BEFORE DISCHARGE:  ADLs back to baseline: No     Activity and level of assistance: Up with standby assistance.     Pain status: Improved with use of alternative comfort measures i.e.: distraction using pts own laptop     Return to near baseline physical activity: No, per pt he feels weak          Discharge Planner Nurse   Safe discharge environment identified: Yes  Barriers to discharge: Yes    Please review provider order for any additional goals.   Nurse to notify provider when observation goals have been met and patient is ready for discharge.       Patient is Alert and Oriented x4. He is IND in the room. Patient is on Contact precuations for MRSA.  Pt is a Regular diet. Left sided weakness improving. Neuro consult pending.

## 2020-11-14 NOTE — H&P
Admitted:     11/13/2020      CHIEF COMPLAINT:  Left side weakness and transient change in mental status.      HISTORY OF PRESENT ILLNESS:  Ted Borrero is a 35-year-old gentleman with past medical history significant for attention deficit disorder, hypertension and depression, who presents from home with left side weakness and also transient confusion.  This started around 1600 this afternoon. His partner found him coming out of the bathroom after vomiting, and he was not acting right and noticed change in mental status. Patient was confused and not able to answer questions correctly and was mumbling at the time.  He also had a left-side facial droop and weakness of the left arm.  His symptoms were relieved upon arrival to the emergency room.  The patient stated he was relaxing at home and suddenly started feeling dizziness and bilateral arm numbness and also sudden pain in the left arm and also metallic taste in his mouth.  His symptoms were resolved when I examined him.      In the emergency room, he was evaluated.  Stroke code was called.  The patient had a CT head angiogram as well as MRI, all negative for a stroke.  Neuro stroke consulted and after work-up is complete recommended the patient to be admitted to the hospital and retired and evaluated by general neurologist for atypical seizures.  The patient denied drug use earlier, but he told me that he used to use marijuana and also meth, but not recently.  When I told him he is positive for marijuana and meth, he stated probably he came in contact with meth while he was cleaning his garage.  Patient is not forthcoming with details of his drug use.     PAST MEDICAL HISTORY:   1.  Attention deficit disorder.   2.  Depression.   3.  Hypertension.   4.  History of MRSA infection to the wound.      PAST SURGICAL HISTORY:  Reviewed.      FAMILY HISTORY:  Reviewed.  No known family medical condition to the patient.      SOCIAL HISTORY:  He lives with his wife.  He  smokes about a half pack per day.  He drinks alcohol; last drink was 3 days ago.  He is not forthcoming about his use of drugs.      REVIEW OF SYSTEMS:  Ten points reviewed, all are negative.   CONSTITUTIONAL:  No fever, no chills, no change in his weight or appetite.   HEENT:  No itchy eye no runny nose, no sore throat.   CARDIOVASCULAR:  No chest pain, no palpitation, no dizziness.   RESPIRATORY:  No cough, no runny nose, no sore throat.   GASTROINTESTINAL:  He had an episode of vomiting, no diarrhea or constipation.   GENITOURINARY:  No urinary urgency, frequency or dysuria.   PSYCHIATRIC:  No agitation.  Transient confusion.   NEUROLOGIC:  As mentioned above, has some confusion and weakness of his left arm and leg, which is resolving.  All other systems reviewed and negative.      HOME MEDICATIONS:    Prior to Admission Medications   Prescriptions Last Dose Informant Patient Reported? Taking?   PARoxetine (PAXIL) 10 MG tablet 11/12/2020 at Unknown time  Yes Yes   Sig: Take 20 mg by mouth daily    QUEtiapine (SEROQUEL XR) 50 MG TB24 24 hr tablet 11/12/2020 at Unknown time  Yes Yes   Sig: Take 150 mg by mouth At Bedtime   hydrOXYzine (VISTARIL) 50 MG capsule 11/12/2020 at Unknown time  Yes Yes   Sig: Take 50 mg by mouth 3 times daily   prazosin (MINIPRESS) 1 MG capsule 11/12/2020 at Unknown time  Yes Yes   Sig: Take 1 mg by mouth At Bedtime      Facility-Administered Medications: None      ALLERGIES:  NO KNOWN DRUG ALLERGIES.        PHYSICAL EXAMINATION:   GENERAL:  The patient is awake, alert, oriented, not in distress.   VITAL SIGNS:  Blood pressure 140/80, pulse rate 84, temperature 98.6, oxygen saturation 99% on room air.   HEENT:  Pink, nonicteric.  Extraocular muscle movement intact.  Symmetric face.   NECK:  Supple, no JVD, no thyromegaly.   CHEST:  Good air entry bilaterally.  No wheezing, crackles or rales.   CARDIOVASCULAR:  S1, S2 regular, no gallop or murmur.   ABDOMEN:  Soft, nontender, nondistended.    EXTREMITIES:  No edema, cyanosis or clubbing.   SKIN:  Multiple tattoos noted.   NEUROLOGIC:  Alert and oriented x 3.  No speech difficulty.  Moves all his extremities.  Some subtle weakness noted on left upper and lower extremity.  Power seems to be 4/5 on the left arm otherwise 5/5 on the left leg and right extremities..   PSYCHIATRIC:  No agitation, calm and cooperative.      DIAGNOSTIC TESTS OF INTEREST:  EKG showed sinus tachycardia at 104 beats per minute.  CT head without contrast:  No acute intracranial abnormality.  CT head perfusion with contrast:  Normal cerebral perfusion.  CT angiogram head and neck normal.  MRI of the brain with contrast:  No finding to explain patient's symptoms.  Minimal nonspecific cerebral white matter T2 hyperintensity.      DIAGNOSTIC TESTS:  Electrolytes are normal.  CBC is normal except WBC is 11.6.  INR is 0.9.  Urinalysis is negative.  Alcohol level is negative.  Drug of abuse:  Amphetamine and cannabinoids positive.      ASSESSMENT:  Ted Borrero is a 35-year-old gentleman with a history of attention deficit disorder, hypertension, depression, who stated currently only on Paxil and Seroquel, came in today with a complaint of change in his mental status, episode of vomiting and some transient weakness of his left side, and stroke is ruled out and being admitted to the hospital under observation for further evaluation.   1.  Transient left-sided weakness.   2.  Toxic encephalopathy likely secondary to illicit drug.   3.  Rule out partial  seizure.   4.  Depression.   5.  Attention deficit disorder currently not on Adderall.   6.  Substance abuse, positive for methamphetamine and cannabis.      PLAN:  The patient is being admitted under observation.  Stroke is ruled out.  I suspect this is secondary to possibly use of meth.  He stated he was relaxing when this happened but does not give the details of what that means. The patient had extensive workup in the emergency room.   Stroke Neurology evaluated the patient.  Left side weakness is resolving, no facial droop, speech is normal, slight weakness on the left arm noted, otherwise the weakness is completely resolved.   No speech difficulty.  Noted on further workup regarding this, patient will need workup to rule out seizure.  EEG is ordered, general neurologist is consulted.      The patient is full code.    I workup is complete and continue to improve and the EEG is normal, he can be discharged.      YOSELIN MORALES MD             D: 2020   T: 2020   MT: RIZWAN      Name:     TOYA VALENCIA   MRN:      [id number]        Account:      [id number]   :      1985        Admitted:     2020                   Document: [id number]

## 2020-11-16 LAB — INTERPRETATION ECG - MUSE: NORMAL

## 2020-12-14 ENCOUNTER — HEALTH MAINTENANCE LETTER (OUTPATIENT)
Age: 35
End: 2020-12-14

## 2021-04-06 ENCOUNTER — HOSPITAL ENCOUNTER (OUTPATIENT)
Facility: CLINIC | Age: 36
Setting detail: OBSERVATION
Discharge: HOME | End: 2021-04-07
Attending: EMERGENCY MEDICINE | Admitting: INTERNAL MEDICINE
Payer: COMMERCIAL
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
## 2021-04-06 DIAGNOSIS — R40.4 TRANSIENT ALTERATION OF AWARENESS: ICD-10-CM

## 2021-04-06 DIAGNOSIS — R79.89 ELEVATED TROPONIN: ICD-10-CM

## 2021-04-06 LAB
BASOPHILS # BLD AUTO: 0.1 10E9/L (ref 0–0.2)
BASOPHILS NFR BLD AUTO: 0.2 %
DIFFERENTIAL METHOD BLD: ABNORMAL
EOSINOPHIL # BLD AUTO: 0 10E9/L (ref 0–0.7)
EOSINOPHIL NFR BLD AUTO: 0.2 %
ERYTHROCYTE [DISTWIDTH] IN BLOOD BY AUTOMATED COUNT: 12.1 % (ref 10–15)
HCT VFR BLD AUTO: 45 % (ref 40–53)
HGB BLD-MCNC: 14.3 G/DL (ref 13.3–17.7)
IMM GRANULOCYTES # BLD: 0.1 10E9/L (ref 0–0.4)
IMM GRANULOCYTES NFR BLD: 0.6 %
LYMPHOCYTES # BLD AUTO: 1.8 10E9/L (ref 0.8–5.3)
LYMPHOCYTES NFR BLD AUTO: 9.1 %
MCH RBC QN AUTO: 30.2 PG (ref 26.5–33)
MCHC RBC AUTO-ENTMCNC: 31.8 G/DL (ref 31.5–36.5)
MCV RBC AUTO: 95 FL (ref 78–100)
MONOCYTES # BLD AUTO: 1.4 10E9/L (ref 0–1.3)
MONOCYTES NFR BLD AUTO: 6.9 %
NEUTROPHILS # BLD AUTO: 16.7 10E9/L (ref 1.6–8.3)
NEUTROPHILS NFR BLD AUTO: 83 %
NRBC # BLD AUTO: 0 10*3/UL
NRBC BLD AUTO-RTO: 0 /100
PLATELET # BLD AUTO: 313 10E9/L (ref 150–450)
RBC # BLD AUTO: 4.74 10E12/L (ref 4.4–5.9)
WBC # BLD AUTO: 20.1 10E9/L (ref 4–11)

## 2021-04-06 PROCEDURE — 80179 DRUG ASSAY SALICYLATE: CPT | Performed by: EMERGENCY MEDICINE

## 2021-04-06 PROCEDURE — 93005 ELECTROCARDIOGRAM TRACING: CPT

## 2021-04-06 PROCEDURE — 82077 ASSAY SPEC XCP UR&BREATH IA: CPT | Performed by: EMERGENCY MEDICINE

## 2021-04-06 PROCEDURE — 99285 EMERGENCY DEPT VISIT HI MDM: CPT | Mod: 25

## 2021-04-06 PROCEDURE — 80143 DRUG ASSAY ACETAMINOPHEN: CPT | Performed by: EMERGENCY MEDICINE

## 2021-04-06 PROCEDURE — C9803 HOPD COVID-19 SPEC COLLECT: HCPCS

## 2021-04-06 PROCEDURE — 96360 HYDRATION IV INFUSION INIT: CPT

## 2021-04-06 PROCEDURE — 84484 ASSAY OF TROPONIN QUANT: CPT | Performed by: EMERGENCY MEDICINE

## 2021-04-06 PROCEDURE — 85379 FIBRIN DEGRADATION QUANT: CPT | Performed by: EMERGENCY MEDICINE

## 2021-04-06 PROCEDURE — 96361 HYDRATE IV INFUSION ADD-ON: CPT

## 2021-04-06 PROCEDURE — 85025 COMPLETE CBC W/AUTO DIFF WBC: CPT | Performed by: EMERGENCY MEDICINE

## 2021-04-06 PROCEDURE — 84443 ASSAY THYROID STIM HORMONE: CPT | Performed by: EMERGENCY MEDICINE

## 2021-04-06 PROCEDURE — 80053 COMPREHEN METABOLIC PANEL: CPT | Performed by: EMERGENCY MEDICINE

## 2021-04-06 PROCEDURE — 87635 SARS-COV-2 COVID-19 AMP PRB: CPT | Performed by: EMERGENCY MEDICINE

## 2021-04-06 RX ORDER — SODIUM CHLORIDE 9 MG/ML
INJECTION, SOLUTION INTRAVENOUS CONTINUOUS
Status: DISCONTINUED | OUTPATIENT
Start: 2021-04-07 | End: 2021-04-07 | Stop reason: HOSPADM

## 2021-04-07 ENCOUNTER — APPOINTMENT (OUTPATIENT)
Dept: CARDIOLOGY | Facility: CLINIC | Age: 36
End: 2021-04-07
Attending: INTERNAL MEDICINE
Payer: COMMERCIAL

## 2021-04-07 ENCOUNTER — APPOINTMENT (OUTPATIENT)
Dept: GENERAL RADIOLOGY | Facility: CLINIC | Age: 36
End: 2021-04-07
Attending: EMERGENCY MEDICINE
Payer: COMMERCIAL

## 2021-04-07 ENCOUNTER — APPOINTMENT (OUTPATIENT)
Dept: CT IMAGING | Facility: CLINIC | Age: 36
End: 2021-04-07
Attending: EMERGENCY MEDICINE
Payer: COMMERCIAL

## 2021-04-07 VITALS
DIASTOLIC BLOOD PRESSURE: 76 MMHG | BODY MASS INDEX: 37.26 KG/M2 | SYSTOLIC BLOOD PRESSURE: 128 MMHG | HEIGHT: 69 IN | HEART RATE: 74 BPM | RESPIRATION RATE: 18 BRPM | OXYGEN SATURATION: 99 % | TEMPERATURE: 97.7 F | WEIGHT: 251.6 LBS

## 2021-04-07 PROBLEM — R79.89 ELEVATED TROPONIN: Status: ACTIVE | Noted: 2021-04-07
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
## 2021-04-07 PROBLEM — R40.4 TRANSIENT ALTERATION OF AWARENESS: Status: ACTIVE | Noted: 2021-04-07

## 2021-04-07 LAB
ALBUMIN SERPL-MCNC: 4.6 G/DL (ref 3.4–5)
ALP SERPL-CCNC: 64 U/L (ref 40–150)
ALT SERPL W P-5'-P-CCNC: 36 U/L (ref 0–70)
ANION GAP SERPL CALCULATED.3IONS-SCNC: 4 MMOL/L (ref 3–14)
APAP SERPL-MCNC: <2 MG/L (ref 10–20)
AST SERPL W P-5'-P-CCNC: 21 U/L (ref 0–45)
BILIRUB SERPL-MCNC: 0.3 MG/DL (ref 0.2–1.3)
BUN SERPL-MCNC: 17 MG/DL (ref 7–30)
CALCIUM SERPL-MCNC: 9.2 MG/DL (ref 8.5–10.1)
CHLORIDE SERPL-SCNC: 102 MMOL/L (ref 94–109)
CO2 SERPL-SCNC: 30 MMOL/L (ref 20–32)
CREAT SERPL-MCNC: 1.06 MG/DL (ref 0.66–1.25)
D DIMER PPP FEU-MCNC: <0.3 UG/ML FEU (ref 0–0.5)
ETHANOL SERPL-MCNC: <0.01 G/DL
GFR SERPL CREATININE-BSD FRML MDRD: >90 ML/MIN/{1.73_M2}
GLUCOSE SERPL-MCNC: 129 MG/DL (ref 70–99)
INTERPRETATION ECG - MUSE: NORMAL
LABORATORY COMMENT REPORT: NORMAL
POTASSIUM SERPL-SCNC: 3.8 MMOL/L (ref 3.4–5.3)
PROT SERPL-MCNC: 8.7 G/DL (ref 6.8–8.8)
SALICYLATES SERPL-MCNC: 2 MG/DL
SARS-COV-2 RNA RESP QL NAA+PROBE: NEGATIVE
SODIUM SERPL-SCNC: 136 MMOL/L (ref 133–144)
SPECIMEN SOURCE: NORMAL
TROPONIN I SERPL-MCNC: 0.04 UG/L (ref 0–0.04)
TROPONIN I SERPL-MCNC: 0.07 UG/L (ref 0–0.04)
TSH SERPL DL<=0.005 MIU/L-ACNC: 2.55 MU/L (ref 0.4–4)

## 2021-04-07 PROCEDURE — 93306 TTE W/DOPPLER COMPLETE: CPT | Mod: 26 | Performed by: INTERNAL MEDICINE

## 2021-04-07 PROCEDURE — G0378 HOSPITAL OBSERVATION PER HR: HCPCS

## 2021-04-07 PROCEDURE — 255N000002 HC RX 255 OP 636: Performed by: INTERNAL MEDICINE

## 2021-04-07 PROCEDURE — 99220 PR INITIAL OBSERVATION CARE,LEVEL III: CPT | Performed by: INTERNAL MEDICINE

## 2021-04-07 PROCEDURE — 36415 COLL VENOUS BLD VENIPUNCTURE: CPT | Performed by: INTERNAL MEDICINE

## 2021-04-07 PROCEDURE — 258N000003 HC RX IP 258 OP 636: Performed by: EMERGENCY MEDICINE

## 2021-04-07 PROCEDURE — 999N000208 ECHOCARDIOGRAM COMPLETE

## 2021-04-07 PROCEDURE — 84484 ASSAY OF TROPONIN QUANT: CPT | Performed by: INTERNAL MEDICINE

## 2021-04-07 PROCEDURE — 71046 X-RAY EXAM CHEST 2 VIEWS: CPT

## 2021-04-07 PROCEDURE — 70450 CT HEAD/BRAIN W/O DYE: CPT

## 2021-04-07 RX ADMIN — SODIUM CHLORIDE 1000 ML: 9 INJECTION, SOLUTION INTRAVENOUS at 00:01

## 2021-04-07 RX ADMIN — HUMAN ALBUMIN MICROSPHERES AND PERFLUTREN 9 ML: 10; .22 INJECTION, SOLUTION INTRAVENOUS at 16:13

## 2021-04-07 RX ADMIN — SODIUM CHLORIDE: 9 INJECTION, SOLUTION INTRAVENOUS at 01:51

## 2021-04-07 ASSESSMENT — MIFFLIN-ST. JEOR: SCORE: 2071.63

## 2021-04-07 NOTE — H&P
[facility]    History and Physical  Hospitalist       Date of Admission:  4/6/2021  Date of Service (when I saw the patient): 04/07/21    Assessment & Plan   Ted Borrero is a 36 year old male who presents with altered mental status    Asymptomatic COVID-19 negative on admission, low suspicion    Encephalopathy, suspected toxic  Leukocytosis  Concern for ingestion  Pt reports in garage doing projects when he locked to smoke a cigarette. He reports then he became sleepy, may have fallen forward. Wife found and could not arouse him. Concern from wife as he does have a history of overdose and wife remarked to nurse pt had recent meth lopez. Pt denies SI or self harm. Found to be 85% on RA by EMS, improved to 100% in ED. Alertness improved en route to ED. Troponin 0.072. WBC 20.1. d-dimer negative. Serum acetaminophen, Etoh and salicylate negative. Head CT normal. CXR clear. Possible neurologic event but previous recent eval (11/2020) negative and pt reports EEG negative. Possible cardiac event as well (? SVT, lower suspicion for any ACS). Possible related to ingestion as well although pt denies this.   - monitor for now  - repeat CBC in the am  - UDS pending  - was concern for seizure in the past (11/2020), may need to revisit     NSTEMI, likely type II  Troponin elevated. Likely demand in setting of suspected drug use.   - telemetry  - serial troponins  - echo if significant increase in troponins    DVT Prophylaxis: Ambulate every shift  Code Status: Full Code    Disposition: Expected discharge in 1-2 days    Gopi Kyle MD  [phone] (P)  Text Page     Primary Care Physician   Physician No Ref-Primary    Chief Complaint   Altered mental status    History is obtained from the patient and medical records    History of Present Illness   Ted Borrero is a 36 year old male who presents with mental status.  Patient reported he had been feeling unwell for a couple days with fatigue  and nausea and vomiting.  Day of presentation he also had some nausea vomiting.  He states he went out to the garage to work on some projects.  He feels as if he may have fallen forward.  He had also locked the door.  When his wife went to look for him she found him unconscious/altered and called EMS.  He gradually came to en route to the emergency department.  At the time of visit he is mostly improved although he is little lethargic.  He states he drank some alcohol on Easter but not that much.  He also smoked marijuana with him last 24 hours.  He denies any other overdose or ingestion.  He denies any chest pain or shortness of breath.  Denies any weakness in arms or legs.  Denies any vision changes.  Denies any abdominal pain.    Past Medical History    I have reviewed this patient's medical history and updated it with pertinent information if needed.   Past Medical History:   Diagnosis Date     ADD (attention deficit disorder)        Past Surgical History   I have reviewed this patient's surgical history and updated it with pertinent information if needed.  History reviewed. No pertinent surgical history.    Prior to Admission Medications   Prior to Admission Medications   Prescriptions Last Dose Informant Patient Reported? Taking?   PARoxetine (PAXIL) 10 MG tablet   Yes No   Sig: Take 20 mg by mouth daily    QUEtiapine (SEROQUEL XR) 50 MG TB24 24 hr tablet   Yes No   Sig: Take 150 mg by mouth At Bedtime   hydrOXYzine (VISTARIL) 50 MG capsule   Yes No   Sig: Take 50 mg by mouth 3 times daily   prazosin (MINIPRESS) 1 MG capsule   Yes No   Sig: Take 1 mg by mouth At Bedtime      Facility-Administered Medications: None     Allergies   No Known Allergies    Social History   I have reviewed this patient's social history and updated it with pertinent information if needed. Ted Borrero  reports that he has been smoking. He has been smoking about 0.25 packs per day. He has never used smokeless tobacco. He reports  current alcohol use. He reports that he does not use drugs.    Family History   I have reviewed this patient's family history and updated it with pertinent information if needed.   History reviewed. No pertinent family history.    Review of Systems   The 10 point Review of Systems is negative other than noted in the HPI or here.     Physical Exam   Temp: 98.5  F (36.9  C) Temp src: Oral BP: (!) 145/88 Pulse: 93   Resp: 16 SpO2: 98 % O2 Device: None (Room air)    Vital Signs with Ranges  251 lbs 9.6 oz    Constitutional: alert, oriented and in no acute distress  Eyes: EOMI, PERRL  HEENT: OP clear  Respiratory: CTA B without w/c  Cardiovascular: RRR no murmur. no edema.  GI: soft, nontender, nondistended, BS present  Lymph/Hematologic: no cervical LAD  Genitourinary: deferred  Skin: no rashes or lesions grossly  Musculoskeletal: no deformities or arthritis  Neurologic: CN II-XII, DESAI  Psychiatric: mood and affect wnl    Data   Data reviewed today:  I personally reviewed the EKG tracing showing NSR and the head CT image(s) showing no acute process.  Recent Labs   Lab 04/06/21  2346   WBC 20.1*   HGB 14.3   MCV 95         POTASSIUM 3.8   CHLORIDE 102   CO2 30   BUN 17   CR 1.06   ANIONGAP 4   CHERYL 9.2   *   ALBUMIN 4.6   PROTTOTAL 8.7   BILITOTAL 0.3   ALKPHOS 64   ALT 36   AST 21   TROPI 0.072*       Recent Results (from the past 24 hour(s))   XR Chest 2 Views    Narrative    EXAM: XR CHEST 2 VW  LOCATION: [facility]  DATE/TIME: 4/7/2021 3:16 AM    INDICATION: Chest pain.  COMPARISON: None.      Impression    IMPRESSION: Negative chest.   CT Head w/o Contrast    Narrative    EXAM: CT HEAD W/O CONTRAST  LOCATION: [facility]  DATE/TIME: 4/7/2021 3:24 AM    INDICATION: Confusion  COMPARISON: 11/13/2020  TECHNIQUE: Routine CT Head without IV contrast. Multiplanar reformats. Dose reduction techniques were used.    FINDINGS:  INTRACRANIAL CONTENTS: No intracranial hemorrhage,  extraaxial collection, or mass effect.  No CT evidence of acute infarct. Normal parenchymal attenuation. Normal ventricles and sulci.     VISUALIZED ORBITS/SINUSES/MASTOIDS: No intraorbital abnormality. No paranasal sinus mucosal disease. No middle ear or mastoid effusion.    BONES/SOFT TISSUES: No acute abnormality.      Impression    IMPRESSION:  1.  Normal head CT.

## 2021-04-07 NOTE — ED NOTES
Bed: ED18  Expected date:   Expected time:   Means of arrival:   Comments:  595  36M Probable overdose/Groggy  9726

## 2021-04-07 NOTE — ED PROVIDER NOTES
History     Chief Complaint:    Altered Mental Status      HPI   Ted Borrero is a 36 year old male who presents with altered mental status.  Patient reports that he was in the garage working on some projects.  He locked the door to smoke a cigarette.  He states that he was sitting at his work bench on a stool when he became sleepy.  He thinks he may have fallen forward although is unclear.  He denies any chest pain, shortness of breath, headaches, vomiting, vision changes etc.  He states that he did drink alcohol a few days ago and feels off ever since he was drinking alcohol and this has become his new normal.  He smokes marijuana and tobacco but no other street drugs.  He denies any overdose or ingestion.  His wife became concerned when she could not arouse him and he states that he cannot hear over the fan.  His wife is concerned as he had a history of overdose.  Patient reports he has not taken his home medications for about a week as he is forgotten them.  He denies any thoughts of self-harm or suicidal ideation.  Per medics his sugar was elevated.  He was found to be 85% on room air and improved to 100% prior to arrival in the ER.  He became more alert and oriented as he was transported to the ED.      Review of Systems   All other systems reviewed and are negative.      Allergies:  No Known Allergies      Medications:    No current outpatient medications on file.      Past Medical History:    Past Medical History:   Diagnosis Date     ADD (attention deficit disorder)      Patient Active Problem List    Diagnosis Date Noted     Transient alteration of awareness 04/07/2021     Priority: Medium     Elevated troponin 04/07/2021     Priority: Medium     Fall, initial encounter 04/07/2021     Priority: Medium     Delirium 11/13/2020     Priority: Medium     Left-sided weakness 11/13/2020     Priority: Medium     Colitis 07/28/2019     Priority: Medium        Past Surgical History:    History reviewed. No  "pertinent surgical history.    Family History:    History reviewed. No pertinent family history.    Social History:  Wife coming to the ED.  denies alcohol today but recent use.  Denies street drugs except marijuana.  Reports smoking tobacco     Physical Exam     Patient Vitals for the past 24 hrs:   BP Temp Temp src Pulse Resp SpO2 Height Weight   04/07/21 0550 (!) 145/88 -- -- -- -- -- 1.753 m (5' 9\") 114.1 kg (251 lb 9.6 oz)   04/07/21 0549 133/83 98.5  F (36.9  C) Oral 93 16 98 % -- --   04/07/21 0145 -- -- -- 86 18 97 % -- --   04/07/21 0115 -- -- -- 100 27 97 % -- --   04/07/21 0001 126/73 -- -- 97 16 98 % -- --   04/07/21 0000 126/73 -- -- 94 11 96 % -- --   04/06/21 2245 127/82 98.3  F (36.8  C) Temporal 114 18 94 % -- 111.1 kg (245 lb)       Physical Exam  General: Resting on the bed.  agreeable and calan.    Head: No obvious trauma to head.  Ears, Nose, Throat:  External ears normal.  Nose normal.  No pharyngeal erythema, swelling or exudate.  Midline uvula.  ecchymosis noted around the right orbit.    Eyes:  Conjunctivae clear.  Pupils are small, equal, round, and reactive. EOMI  Neck: Normal range of motion.  Neck supple.  non tender c spine  CV: tachycardic rate and rhythm.  No murmurs.      Respiratory: Effort normal and breath sounds normal.  No wheezing or crackles.   Gastrointestinal: Soft.  No distension. There is no tenderness.  There is no rigidity, no rebound and no guarding.   Musculoskeletal: Normal range of motion.  Non tender extremities to palpations.    Neuro: Alert. Moving all extremities appropriately.  Normal speech.  GCS 15.  CN II-XII grossly intact, no pronator drift.  Gross muscle strength intact of the proximal and distal bilateral upper and lower extremities.  Sensation intact to light touch in all 4 extremities.   Skin: Skin is warm and dry.  No rash noted.     Emergency Department Course   ECG:  ECG taken at 2346  Normal sinus rhythm. Normal ECG.   No significant change as " compared to prior, dated 20.  Rate 98 bpm. NC interval 138 ms. QRS duration 96 ms. QT/QTc 362/462 ms. P-R-T axes 47 -1 19.     Imaging:  CT Head w/o Contrast   Final Result   IMPRESSION:   1.  Normal head CT.      XR Chest 2 Views   Final Result   IMPRESSION: Negative chest.          Laboratory:  Labs Ordered and Resulted from Time of ED Arrival Up to the Time of Departure from the ED   CBC WITH PLATELETS DIFFERENTIAL - Abnormal; Notable for the following components:       Result Value    WBC 20.1 (*)     Absolute Neutrophil 16.7 (*)     Absolute Monocytes 1.4 (*)     All other components within normal limits   COMPREHENSIVE METABOLIC PANEL - Abnormal; Notable for the following components:    Glucose 129 (*)     All other components within normal limits   TROPONIN I - Abnormal; Notable for the following components:    Troponin I ES 0.072 (*)     All other components within normal limits   SARS-COV-2 (COVID-19) VIRUS RT-PCR   TSH WITH FREE T4 REFLEX   ALCOHOL ETHYL   ACETAMINOPHEN LEVEL   SALICYLATE LEVEL   D DIMER QUANTITATIVE   DRUG ABUSE SCREEN 77 URINE (FL, RH, SH)   ROUTINE UA WITH MICROSCOPIC   DOCUMENT IN LEGAL HOLD NAVIGATOR     Emergency Department Course:    Reviewed:  I reviewed nursing notes, vitals and past history    Assessments:   I obtained history and examined the patient as noted above.    I rechecked the patient and explained findings to patient and wife.     Interventions:  Medications   0.9% sodium chloride BOLUS (0 mLs Intravenous Stopped 21 0150)     Followed by   sodium chloride 0.9% infusion ( Intravenous New Bag 21 0151)       Disposition:  The patient was admitted to the hospital under the care of Dr. Kyle.    Impression & Plan      Medical Decision Makin-year-old male found with altered mental status.  Vital signs stable mildly tachycardic initially but this improved.  Broad differential was pursued include not limited to ACS, arrhythmia, drug or alcohol abuse,  overdose, ingestion, trauma, intracranial hemorrhage, stroke, etc.  Patient has a nonfocal neurologic exam but does have some ecchymosis on the right orbit.  Head CT was obtained showing no evidence of acute internal hemorrhage, mass, herniation.  Chest x-ray is negative for any acute pneumonia, pneumothorax or effusion.  CBC shows marked leukocytosis, suspect stress demargination without fever or other infectious signs or symptoms.  CMP shows no acute electrolyte, metabolic or renal dysfunction.  EKG is normal, sinus tachycardia, no ischemic changes.  But troponin is elevated 0.072.  I suspect this is demand in the setting of hypoxia and likely drug abuse.  TSH is otherwise normal.  Alcohol level is negative.  Salicylate and Tylenol level are unremarkable, patient denies any acute ingestion of those drugs.  Considered PE but dimer is negative.  Patient has a history of drug abuse.  Wife had made remarks to the nurse about a recent meth lopez.  I suspect demand ischemia and hypoxia secondary to drug overdose.  Patient was found to be 85% by medics.  At this point patient is vitally stable and has not required any acute intervention.  He will be admitted to observation for continued clearance.  He denies any active suicidal or homicidal ideation.  He was placed on a GERTRUDIS given initial concerns for elopement and possible overdose.  Patient was admitted to the hospitalist who graciously accepted.    Covid-19  Ted Borrero was evaluated during a global COVID-19 pandemic, which necessitated consideration that the patient might be at risk for infection with the SARS-CoV-2 virus that causes COVID-19.   Applicable protocols for evaluation were followed during the patient's care.   COVID-19 was considered as part of the patient's evaluation. The plan for testing is:  a test was obtained during this visit.    Diagnosis:    ICD-10-CM    1. Transient alteration of awareness  R40.4    2. Elevated troponin  R77.8    3. Fall,  initial encounter  W19.XXXA        Discharge Medications:  Current Discharge Medication List           Val Schultz MD  04/07/21 0636

## 2021-04-07 NOTE — PROGRESS NOTES
RECEIVING UNIT ED HANDOFF REVIEW    ED Nurse Handoff Report was reviewed by: KURTIS ARGUETA, RN on April 7, 2021 at 5:30 AM

## 2021-04-07 NOTE — ED TRIAGE NOTES
Pt was not responding to wife when she knocked on the door. Wife called 911 because pt has hx of overdose. EMS found pt face down on the floor in the garage. Pt was not responding initially  but woke up in the ambulance. Pt's O2 sat was 85% on room air and at 100% just before arriving to the ED. EMS reports no medication given en route. Blood glucose level  was 242.

## 2021-04-07 NOTE — PLAN OF CARE
Neuro- A/O x4 neuros intact   Most Recent Vitals- Temp: 98.5  F (36.9  C) Temp src: Oral BP: (!) 145/88 Pulse: 93   Resp: 16 SpO2: 98 % O2 Device: None (Room air)    Tele/Cardiac- SR  Resp- room air   Activity- SBA, 1 assist   Pain- denies chest pain and no other pain   Drips- IV SL   Skin- WDL, no open areas to skin, scabs to RLE.   GI/- due to void   Aggression Color- Green  Plan- no orders placed by MD yet. Awaiting care plan orders. Possible cards consult d/t troponin bump.   Misc- will continue with plan of care until discharge. Has been NPO since arriving to unit.     KURTIS ARGUETA, RN

## 2021-04-07 NOTE — PLAN OF CARE
Patient discharged home  At 1705 accompanied by NA   Written instructions sent with patient - and patient/family verbalized understanding.   Belongings sent with the patient -yes  Home medications sent with patient n/a  Prescriptions sent none

## 2021-04-07 NOTE — ED NOTES
[facility]  ED Nurse Handoff Report    ED Chief complaint: Altered Mental Status      ED Diagnosis:   Final diagnoses:   Transient alteration of awareness   Elevated troponin   Fall, initial encounter       Code Status: Full Code    Allergies: No Known Allergies    Patient Story: 36 year old male presented to the ED via ambulance for LOC  Focused Assessment:  Pt was in his garage with the door looked and wouldn't answer the door when his wife knocked and called for him. Pt's wife called 911 and the  found thew pt sitting on a stool slumped forward and unresponsive. Pt's O2 sat was 85% and later in the ambulance pt's O2 sat went up to 98 to 100%.    Treatments and/or interventions provided: labs, 2 L of NS, covid-19 test, EKG, chest XR, CT  Patient's response to treatments and/or interventions: good    To be done/followed up on inpatient unit:  monitor    Does this patient have any cognitive concerns?: none    Activity level - Baseline/Home:  Independent  Activity Level - Current:   Independent    Patient's Preferred language: English   Needed?: No    Isolation: None  Infection: Not Applicable  MRSA  Patient tested for COVID 19 prior to admission: YES  Bariatric?: No    Vital Signs:   Vitals:    04/07/21 0000 04/07/21 0001 04/07/21 0115 04/07/21 0145   BP: 126/73 126/73     Pulse: 94 97 100 86   Resp: 11 16 27 18   Temp:       TempSrc:       SpO2: 96% 98% 97% 97%   Weight:           Cardiac Rhythm:     Was the PSS-3 completed:   Yes  What interventions are required if any?    Interventions: Monitored via video  Required Interventions: Room searched;Room made safe;Patient searched;Belongings removed  High Risk Required Interventions: On continuous in person observation    Family Comments: wife at bedside  OBS brochure/video discussed/provided to patient/family: Yes              Name of person given brochure if not patient: na              Relationship to patient: na    For the  majority of the shift this patient's behavior was Green.   Behavioral interventions performed were none    ED NURSE PHONE NUMBER: [phone]

## 2021-04-18 ENCOUNTER — HEALTH MAINTENANCE LETTER (OUTPATIENT)
Age: 36
End: 2021-04-18

## 2021-10-02 ENCOUNTER — HEALTH MAINTENANCE LETTER (OUTPATIENT)
Age: 36
End: 2021-10-02

## 2022-05-14 ENCOUNTER — HEALTH MAINTENANCE LETTER (OUTPATIENT)
Age: 37
End: 2022-05-14

## 2022-09-04 ENCOUNTER — HEALTH MAINTENANCE LETTER (OUTPATIENT)
Age: 37
End: 2022-09-04

## 2022-10-30 ENCOUNTER — APPOINTMENT (OUTPATIENT)
Dept: CT IMAGING | Facility: CLINIC | Age: 37
End: 2022-10-30
Attending: EMERGENCY MEDICINE
Payer: COMMERCIAL

## 2022-10-30 ENCOUNTER — HOSPITAL ENCOUNTER (EMERGENCY)
Facility: CLINIC | Age: 37
Discharge: HOME | End: 2022-10-31
Attending: EMERGENCY MEDICINE | Admitting: EMERGENCY MEDICINE
Payer: COMMERCIAL

## 2022-10-30 ENCOUNTER — APPOINTMENT (OUTPATIENT)
Dept: RADIOLOGY | Facility: CLINIC | Age: 37
End: 2022-10-30
Attending: EMERGENCY MEDICINE
Payer: COMMERCIAL

## 2022-10-30 DIAGNOSIS — R94.31 NONSPECIFIC ABNORMAL ELECTROCARDIOGRAM (ECG) (EKG): ICD-10-CM

## 2022-10-30 DIAGNOSIS — T40.604A OPIATE OVERDOSE, UNDETERMINED INTENT, INITIAL ENCOUNTER (H): ICD-10-CM

## 2022-10-30 LAB
ANION GAP SERPL CALCULATED.3IONS-SCNC: 7 MMOL/L (ref 5–18)
BUN SERPL-MCNC: 16 MG/DL (ref 8–22)
CALCIUM SERPL-MCNC: 9.2 MG/DL (ref 8.5–10.5)
CHLORIDE BLD-SCNC: 107 MMOL/L (ref 98–107)
CO2 SERPL-SCNC: 25 MMOL/L (ref 22–31)
CREAT SERPL-MCNC: 1.25 MG/DL (ref 0.7–1.3)
ERYTHROCYTE [DISTWIDTH] IN BLOOD BY AUTOMATED COUNT: 12.3 % (ref 10–15)
GFR SERPL CREATININE-BSD FRML MDRD: 77 ML/MIN/1.73M2
GLUCOSE BLD-MCNC: 185 MG/DL (ref 70–125)
HCT VFR BLD AUTO: 44.4 % (ref 40–53)
HGB BLD-MCNC: 14.4 G/DL (ref 13.3–17.7)
MCH RBC QN AUTO: 29.9 PG (ref 26.5–33)
MCHC RBC AUTO-ENTMCNC: 32.4 G/DL (ref 31.5–36.5)
MCV RBC AUTO: 92 FL (ref 78–100)
PLATELET # BLD AUTO: 278 10E3/UL (ref 150–450)
POTASSIUM BLD-SCNC: 4.1 MMOL/L (ref 3.5–5)
RBC # BLD AUTO: 4.81 10E6/UL (ref 4.4–5.9)
SODIUM SERPL-SCNC: 139 MMOL/L (ref 136–145)
TROPONIN I SERPL-MCNC: 0.02 NG/ML (ref 0–0.29)
WBC # BLD AUTO: 12.6 10E3/UL (ref 4–11)

## 2022-10-30 PROCEDURE — 71101 X-RAY EXAM UNILAT RIBS/CHEST: CPT | Mod: LT

## 2022-10-30 PROCEDURE — 70450 CT HEAD/BRAIN W/O DYE: CPT

## 2022-10-30 PROCEDURE — 85027 COMPLETE CBC AUTOMATED: CPT | Performed by: EMERGENCY MEDICINE

## 2022-10-30 PROCEDURE — 93005 ELECTROCARDIOGRAM TRACING: CPT | Performed by: EMERGENCY MEDICINE

## 2022-10-30 PROCEDURE — 84484 ASSAY OF TROPONIN QUANT: CPT | Performed by: EMERGENCY MEDICINE

## 2022-10-30 PROCEDURE — 36415 COLL VENOUS BLD VENIPUNCTURE: CPT | Performed by: EMERGENCY MEDICINE

## 2022-10-30 PROCEDURE — 82310 ASSAY OF CALCIUM: CPT | Performed by: EMERGENCY MEDICINE

## 2022-10-30 PROCEDURE — 99285 EMERGENCY DEPT VISIT HI MDM: CPT | Mod: 25

## 2022-10-30 ASSESSMENT — ACTIVITIES OF DAILY LIVING (ADL): ADLS_ACUITY_SCORE: 35

## 2022-10-30 ASSESSMENT — ENCOUNTER SYMPTOMS
HEADACHES: 0
NECK PAIN: 0

## 2022-10-31 VITALS
SYSTOLIC BLOOD PRESSURE: 170 MMHG | OXYGEN SATURATION: 97 % | RESPIRATION RATE: 23 BRPM | HEART RATE: 102 BPM | DIASTOLIC BLOOD PRESSURE: 92 MMHG

## 2022-10-31 LAB
ATRIAL RATE - MUSE: 121 BPM
ATRIAL RATE - MUSE: 90 BPM
DIASTOLIC BLOOD PRESSURE - MUSE: 80 MMHG
DIASTOLIC BLOOD PRESSURE - MUSE: NORMAL MMHG
INTERPRETATION ECG - MUSE: NORMAL
INTERPRETATION ECG - MUSE: NORMAL
P AXIS - MUSE: 52 DEGREES
P AXIS - MUSE: 57 DEGREES
PR INTERVAL - MUSE: 140 MS
PR INTERVAL - MUSE: 154 MS
QRS DURATION - MUSE: 94 MS
QRS DURATION - MUSE: 94 MS
QT - MUSE: 338 MS
QT - MUSE: 384 MS
QTC - MUSE: 469 MS
QTC - MUSE: 479 MS
R AXIS - MUSE: 12 DEGREES
R AXIS - MUSE: 21 DEGREES
SYSTOLIC BLOOD PRESSURE - MUSE: 125 MMHG
SYSTOLIC BLOOD PRESSURE - MUSE: NORMAL MMHG
T AXIS - MUSE: 34 DEGREES
T AXIS - MUSE: 44 DEGREES
TROPONIN I SERPL-MCNC: 0.07 NG/ML (ref 0–0.29)
VENTRICULAR RATE- MUSE: 121 BPM
VENTRICULAR RATE- MUSE: 90 BPM

## 2022-10-31 PROCEDURE — 36415 COLL VENOUS BLD VENIPUNCTURE: CPT | Performed by: EMERGENCY MEDICINE

## 2022-10-31 PROCEDURE — 93005 ELECTROCARDIOGRAM TRACING: CPT | Performed by: EMERGENCY MEDICINE

## 2022-10-31 PROCEDURE — 84484 ASSAY OF TROPONIN QUANT: CPT | Mod: 91 | Performed by: EMERGENCY MEDICINE

## 2022-10-31 ASSESSMENT — ENCOUNTER SYMPTOMS
FATIGUE: 1
VOMITING: 0
SHORTNESS OF BREATH: 0
PHOTOPHOBIA: 0
FLANK PAIN: 0
CONFUSION: 0
CHEST TIGHTNESS: 1
WOUND: 1

## 2022-10-31 ASSESSMENT — ACTIVITIES OF DAILY LIVING (ADL)
ADLS_ACUITY_SCORE: 35
ADLS_ACUITY_SCORE: 35

## 2022-10-31 NOTE — ED TRIAGE NOTES
Pt arrives via [facility]. Per medic report, patient was found unresponsive for unknown amount of time in a hotel bathroom with a needle near by. Medics state patient had agonal breathing, O2 sat 80s. 4mg Narcan was given with response in LOC and O2 sat 97% en route.     Per patient, this was an accidental overdose. Patient states he believed he was using methamphetamines. He is noted to have a hematoma on his forehead with dried blood.      Triage Assessment     Row Name 10/30/22 5383       Triage Assessment (Adult)    Airway WDL WDL       Respiratory WDL    Respiratory WDL WDL       Skin Circulation/Temperature WDL    Skin Circulation/Temperature WDL WDL       Cardiac WDL    Cardiac WDL WDL       Peripheral/Neurovascular WDL    Peripheral Neurovascular WDL WDL       Cognitive/Neuro/Behavioral WDL    Cognitive/Neuro/Behavioral WDL level of consciousness    Level of Consciousness lethargic

## 2022-10-31 NOTE — DISCHARGE INSTRUCTIONS
I placed a referral for primary care for elevated blood pressure which is likely a stress response to your overdose.  I do recommend seeking chemical dependency treatments.  Your EKG was abnormal therefore I do recommend follow-up with primary care doctor for further testing.     Chemical Dependency Resources    To access chemical dependency treatment you must have an assessment complete or have an updated assessment within 30 days of starting any program.   Information for this to be completed and to secure funding if you have medical assistance or no insurance can be found through your [facility]'s chemical health intake line.     If you have private insurance, call the customer service number on the back of your insurance card to find an in-network provider for substance abuse assessment. The ideal provider will be a treatment facility, licensed in the [facility].     For those with Medicaid with the [facility], you will need a Rule 25 assessment: The following are phone numbers for each [facility]. Rule 25 assessments must be completed by the county you reside in currently. Once approved for funding you can connect with a facility that does Rule 25 assessment.   [facility] [phone]  [facility] [phone]  [facility] [phone]  [facility] [phone]  [facility] [phone]  [facility] [phone]  [place] - [phone]  [facility] [phone]    The following facilities off Rule 25/chemical health assessments:    [facility]  [phone]  Mon-Friday: [address], [address]  Saturday: [address], [place], [address]  M-F assessments (7a-1:45p); Saturday assessments (7:45-10:45a)    [facility]  [phone]  [address] [place], [place], [address]  *by appointment only M-W; walk ins available Fridays from 10-2.    [facility]   [phone] (phone consultation available 24/7)  In-person Assessments:  [address], [place] [address]  [address] [address] [place] [address]  [place] [address]  [address] [facility], [place] [address]  [address] [place] [address]    [facility]  [phone]  [address]  [place], [place], [address]  *walk in and appointments available by calling     [facility]  [phone] [address] [place], [place], [address]  *by appointment only M-Th    [facility]  [phone]  [address] [place], [place], [address]  *walk ins available M-F    [facility]  [phone]  [address] [place], [place], [address]  *available by appointments only    [facility]  [phone]  [address] [place], [place], [address]  *available by appointment only    [facility]  [phone]  [facility]   [address] [place], [place], [address]    [facility] (AD[facility])  [phone]  [address] [place] [address], [address]  *Walk in assessments also available M-F starting at 8 am.     [facility]  [phone]  [address] [place], [place], [address]  *available by appointments    [facility]  [phone]  [address] [place], [place], [address]  *walk in assessments available M-F starting at 7 am.     [facility]   [phone]  [facility]  [address] [place], [place], [address]  *Walk in assessments availble M-F starting at 8 am    OR     [phone]  [facility]  [address] [place] [address]  *Walk in assessments available M-F starting at 8 am    [facility]  [phone]  [address] [place] [address]    [facility]   [phone]  [address] [place], [place], [address]  *available by appointment only    [facility]   [phone]  [address] [place] [address]  [place], [address]    [facility]  [phone]  [address]   [place], [address]  *available by appointment    [facility]  [phone]  [address] [facility] [place], [address]  *walk ins available M-TH from 9-3    [facility]  [phone]  [address]   [place], [place], [address]    [facility]  [phone] [address] [facility], [place] [address]  [address] [address], [place] [address]    [facility] (Does Rule 25 Assessments)  [email or web address]/  [phone]  [address], [place] [address]    [facility]  [email or web address].[email or web address]/[email or web address]-services/drug-alcohol-treatment  [phone], [address], [place] [address]  [phone], [address], [place], [place] [address] [phone], [address] [facility], [place], [place] [address] [phone], [address] [facility] , [address], ([facility], [place] [id number] [phone], [address] [place] [address]    If you are intoxicated, you may be required to detox at a detox facility before starting treatment. The following are detox facilities that you can self present to. All detox facilities are able to help you complete an assessment/rule 25 prior to discharge if you choose:    [facility]: [address] [place], [place], [address].   [phone]  [facility]: [address] [place], [place], [address]  [phone]  [facility]: [address] [place], [place], [address] [phone]  [facility]: [address] [place], [place], [address] [phone]    It is recommended that you abstain from all mood altering chemicals. Please contact the sober support hotline (778-796-6094) as needed; phones are answered 24 hours a day, 7 days a week. Other support hotlines include:  [facility]: 9-183-855-4374  [facility] Support Line: 1-910.121.9064    Ways to help cope with sobriety:   -- Take prescribed medicines as  scheduled  -- Keep follow-up appointments   -- Talk to others about your concerns  -- Get regular exercise  -- Practice deep breathing skills   -- Eat a healthy diet   -- Use community resources, including hotline numbers, [facility] crisis and support meetings  -- Stay sober and avoid places/people/things associated with substance use   --Maintain a daily schedule/routine   --Get at least 7-8 hours of sleep per night   --Create a list 10--20 healthy activities that you can do that are enjoyable and do not involve substance use   --Create daily goals (approx. 1-4 goals) per day and work to achieve them throughout the day.    [place] [facility] ([facility])  [facility] connects people seeking recovery to resources that help foster and sustain long-term recovery. Whether you are seeking resources for treatment, transportation, housing, job training, education, health care or other pathways to recovery, [facility] is a great place to start.     Phone: [phone]. www.[employer].CloudSponge (Great listing of all types of recovery and non-recovery related resources)    [facility]  Phone: 9-924-ALCOHOL  Website: HTTP://WWW.AA.ORG/    [facility] [place] (008-169-2124 or http://aaminneaBathrooms.com.org)  [facility] [place] (635-562-6924 or www.aastpaul.org)    Narcotics Anonymous  Phone: 141.595.3070  Website: www.[employer].Crittercism.    [facility]  [address] [place] [address], [address], [place], [place],   Phone: [phone]  Drop-in Hours: Monday-Friday 9-11:30 am. By appointment at other times.  Provides: [facility] is a drop-in center on the east side [facility] that provides a safe space for individuals who are homeless and have a history of chemical use. Sobriety is not a requirement but drugs and alcohol are not allowed on the property.  Services: Non-clients can access drop-in services such as Recovery and Harm Reduction Groups, referrals to case management, community activities, shower facilities, and a pool table. Individuals  who are homeless and have chemical health needs may be eligible for enrollment into [facility] [employer]'s case management program. Clients and  work together to access benefits, treatment, health care, shelter, and external housing resources.    [facility] Chemical Assessment & Referral Unit  [address] [place], [place]  Phone: [phone]  Hours: Monday-Friday 8 am-5 pm.  Provides: Rule 25 assessment and referral for individuals seeking treatment or counseling for chemical dependency. Must be a resident of [facility]. There is no fee for assessment. There is some funding available for treatment programs.    [facility]   [address] [facility]  Intake Phone: [phone] Main: [phone]  Hours: Monday through Friday 7 am-5 pm  Provides: Daily drop-in Rule 25 assessments and weekly mental health assessments and services. Outpatient chemical dependency treatment (with sober recovery housing), relapse prevention, case management, and employment services. Outpatient and residential treatment for women with children. Specializes in assisting individuals with a history of relapse who face multiple barriers to achieving stable recovery.  Remarks: No charge for most services or services can be billed through insurance. Gender-specific services and treatment for co-occurring substance abuse and mental health concerns offered.

## 2022-10-31 NOTE — ED PROVIDER NOTES
EMERGENCY DEPARTMENT ENCOUNTER      NAME: Ted Borrero  AGE: 37 year old male  YOB: 1985  MRN: [id number]  EVALUATION DATE & TIME: 10/30/2022 10:19 PM    PCP: No Ref-Primary, Physician    ED PROVIDER: Jonatan Nathan MD        Chief Complaint   Patient presents with     Drug Overdose         FINAL IMPRESSION:  1. Opiate overdose, undetermined intent, initial encounter (H)    2. Nonspecific abnormal electrocardiogram (ECG) (EKG)          ED COURSE & MEDICAL DECISION MAKING:    Pertinent Labs & Imaging studies reviewed. (See chart for details)  37 year old male presents to the Emergency Department for evaluation of suspected opiate overdose        ED Course as of 10/31/22 0417   Sun Oct 30, 2022   2252 Patient presents after presumed opiate overdose.  First responders reported patient was agonal, hypoxic, diaphoretic and they ministered 4 mg intranasal Narcan and he started spontaneously breathing and became responsive   2252 Patient has blood and contusion to his scalp with some bruising to his left chest wall.  Patient states he thought he was injecting methamphetamine.  Denies any alcohol use.  Denies any chest pain or shortness of breath.  Denies a history of syncope.   2253 Most likely opiate overdose would also exclude arrhythmia, subarachnoid hemorrhage, other causes of syncope and concussion   2253 seizure, subarachnoid hemorrhage, ruptured abdominal aortic aneurysm, aortic dissection, perforated gastric/duodenal ulcer, ruptured ectopic pregnancy, stroke/TIA, acute coronary syndrome, pulmonary embolism, arrhythmia (atrioventricular block/symptomatic bradycardia, Ramandeep-Parkinson-White syndrome, Brugada syndrome, hypertrophic cardiomyopathy, long QT syndrome, arrhythmogenic right ventricular dysplasia), aortic stenosis, hypoglycemia, vasovagal, or orthostatic hypotension.     2253 Plan for EKG, labs, end-tidal CO2, cardiac monitoring, minimum 4 hours observation CT head as well as left-sided chest  x-ray    EKG shows sinus tachycardia with some ischemic changes.  Patient denies chest pain or shortness of breath   Mon Oct 31, 2022   0414 Check patient into these feels fine besides being tired.  No chest pain or shortness of breath.  Says he is scheduled to get into [facility] for intensive outpatient treatment for chemical dependency   0414 We discussed Narcan and seeking treatment and discussed he almost  tonight   414 Discussed need to follow-up primary care doctor for abnormal EKG   4 Referral created   0414 observed in the ER for 6 hours with no additional Narcan needed   416 Troponin I: 0.07  2 normal troponins   416 WBC(!): 12.6  Likely secondary to stress response   0416 This x-ray without evidence of rib fractures.  CT head negative       Medical Decision Making    Supplemental history from: EMS    External Record(s) Reviewed: N/A    Differential Diagnosis: See MDM charting for differential considered.     I performed an independent interpretation of the: EKG: See my documentation.    Discussed with radiology regarding test interpretation: N/A    Discussion of management with another provider: N/A    The following testing was considered but ultimately not selected: None    I considered prescription management with: Other:Narcan which was prescribed    The patient's care impacted: None and Mental Health    Consideration of Admission/Observation: N/A - Patient discharged without consideration for admission    Care significantly affected by Social Determinants of Health including: Alcohol Abuse and/or Recreational Drug Use     10:20 PM I met with the patient and performed my initial interview and exam. PPE worn includes face shield and an N95 mask     10:34 PM I rechecked the patient and gathered more history     2:37 AM I rechecked the patient, their GCS is 15, they are breathing spontaneously and I discussed the results of their labs and imaging      4:13 AM I met with the patient and  "discussed plan for discharge     At the conclusion of the encounter I discussed the results of all of the tests and the disposition. The questions were answered. The patient or family acknowledged understanding and was agreeable with the care plan.         MEDICATIONS GIVEN IN THE EMERGENCY:  Medications - No data to display    NEW PRESCRIPTIONS STARTED AT TODAY'S ER VISIT  New Prescriptions    NALOXONE (NARCAN) 4 MG/0.1ML NASAL SPRAY    Spray 1 spray (4 mg) into one nostril alternating nostrils as needed for opioid reversal every 2-3 minutes until assistance arrives          =================================================================    HPI    Triage note  \"  Pt arrives via [facility]. Per medic report, patient was found unresponsive for unknown amount of time in a hotel bathroom with a needle near by. Medics state patient had agonal breathing, O2 sat 80s. 4mg Narcan was given with response in LOC and O2 sat 97% en route.     Per patient, this was an accidental overdose. Patient states he believed he was using methamphetamines. He is noted to have a hematoma on his forehead with dried blood.      Triage Assessment     Row Name 10/30/22 0552       Triage Assessment (Adult)    Airway WDL WDL       Respiratory WDL    Respiratory WDL WDL       Skin Circulation/Temperature WDL    Skin Circulation/Temperature WDL WDL       Cardiac WDL    Cardiac WDL WDL       Peripheral/Neurovascular WDL    Peripheral Neurovascular WDL WDL       Cognitive/Neuro/Behavioral WDL    Cognitive/Neuro/Behavioral WDL level of consciousness    Level of Consciousness lethargic              \"      Patient information was obtained from: Patient and EMS report    Use of : N/A       Ted Borrero is a 37 year old male with a pertinent medical history of obesity, tobacco use disorder, and ADHD who presents to this ED via EMS for evaluation of drug overdose  Social History: uses methamphetamine, but denies any other drug or opiate " "use. Reports a \"couple drinks here and there\" of alcohol    Per EMS report: Prior to arrival the patient was found unresponsive in  A hotel bathroom. Police found the patient to have agonal breathing and was given 4mg of narcan intranasally. Following the narcan, the patient's oxygen levels increased from 80% to 97%. Patient's blood glucose level was normal. There were 2 people on the scene, but police received conflicting stories about who called 911.    Per patient: The patient remembers going to the hotel with friends tonight and states that they \"injected\" what they expected to be methamphetamine. They report that they \"don't think\" it was \"very long\" following injection that they lost consciousness. The patient states they \"weren't aware of any opiates\" tonight. The patient reports that they do not use opiates on a regular basis and were not using opiates or alcohol tonight. The patient denies taking any prescription medication or having a history of heart problems or diabetes. Patient denies history of fainting, chest pain, headache, neck pain, nose pain, or any other symptoms or complaints. The patient endorsed a small cut and dried blood on their forehead.    REVIEW OF SYSTEMS   Review of Systems   Constitutional: Positive for fatigue.   Eyes: Negative for photophobia.   Respiratory: Positive for chest tightness. Negative for shortness of breath.    Cardiovascular: Negative for chest pain.   Gastrointestinal: Negative for vomiting.   Genitourinary: Negative for flank pain.   Musculoskeletal: Negative for neck pain.   Skin: Positive for wound.   Neurological: Positive for syncope. Negative for headaches.   Psychiatric/Behavioral: Negative for confusion.       PAST MEDICAL HISTORY:  Past Medical History:   Diagnosis Date     ADD (attention deficit disorder)        PAST SURGICAL HISTORY:  History reviewed. No pertinent surgical history.        CURRENT MEDICATIONS:    naloxone (NARCAN) 4 MG/0.1ML nasal " spray        ALLERGIES:  No Known Allergies    FAMILY HISTORY:  History reviewed. No pertinent family history.    SOCIAL HISTORY:   Social History     Socioeconomic History     Marital status: Single   Tobacco Use     Smoking status: Every Day     Packs/day: 0.25     Types: Cigarettes     Smokeless tobacco: Never   Substance and Sexual Activity     Alcohol use: Yes     Comment: occasional     Drug use: No     Sexual activity: Yes       VITALS:  BP (!) 170/92   Pulse 102   Resp 23   SpO2 97%     PHYSICAL EXAM      Vitals: BP (!) 170/92   Pulse 102   Resp 23   SpO2 97%   General: Appears in no acute distress, awake, alert, interactive. Patient is anxious   Eyes: Conjunctivae non-injected. Sclera anicteric.  pupils midsized  HENT: small contusion to right forehead. Dried blood on forehead  Neck: Supple.  Respiratory/Chest:no increased work of breathing, bruising to left chest wall with no tenderness   no hypoventilation  Cardio: Tachycardic   Abdomen: non distended  Musculoskeletal: Normal extremities. No edema or erythema.  Skin: Normal color. No rash or diaphoresis.  Neurologic: Face symmetric, moves all extremities spontaneously. Speech clear.  Psychiatric: Oriented to person, place, and time.  Slight anxiousness       LAB:  All pertinent labs reviewed and interpreted.  Results for orders placed or performed during the hospital encounter of 10/30/22   Ribs XR, unilat 3 views + PA chest,  left    Impression    IMPRESSION: The visualized heart and lungs are negative. No left rib fractures.   Head CT w/o contrast    Impression    IMPRESSION:  1.  Normal head CT.   Troponin I (now)   Result Value Ref Range    Troponin I 0.02 0.00 - 0.29 ng/mL   CBC (+ platelets, no diff)   Result Value Ref Range    WBC Count 12.6 (H) 4.0 - 11.0 10e3/uL    RBC Count 4.81 4.40 - 5.90 10e6/uL    Hemoglobin 14.4 13.3 - 17.7 g/dL    Hematocrit 44.4 40.0 - 53.0 %    MCV 92 78 - 100 fL    MCH 29.9 26.5 - 33.0 pg    MCHC 32.4 31.5 - 36.5  g/dL    RDW 12.3 10.0 - 15.0 %    Platelet Count 278 150 - 450 10e3/uL   Basic metabolic panel   Result Value Ref Range    Sodium 139 136 - 145 mmol/L    Potassium 4.1 3.5 - 5.0 mmol/L    Chloride 107 98 - 107 mmol/L    Carbon Dioxide (CO2) 25 22 - 31 mmol/L    Anion Gap 7 5 - 18 mmol/L    Urea Nitrogen 16 8 - 22 mg/dL    Creatinine 1.25 0.70 - 1.30 mg/dL    Calcium 9.2 8.5 - 10.5 mg/dL    Glucose 185 (H) 70 - 125 mg/dL    GFR Estimate 77 >60 mL/min/1.73m2   Troponin I (now)   Result Value Ref Range    Troponin I 0.07 0.00 - 0.29 ng/mL       RADIOLOGY:  Reviewed all pertinent imaging. Please see official radiology report.  Ribs XR, unilat 3 views + PA chest,  left   Final Result   IMPRESSION: The visualized heart and lungs are negative. No left rib fractures.      Head CT w/o contrast   Final Result   IMPRESSION:   1.  Normal head CT.          EKG:    Performed at: 10/30/22 at 10:23 PM    Impression: incomplete right bundle branch block. Possible inferior infarct, age undetermined. Abnormal EKG      Rate: 121  Rhythm: sinus tachycardia   Axis: 21  MI Interval: 140  QRS Interval: 94  QTc Interval: 479  ST Changes: ST depression in anterior leads   Comparison: When compared to EKG from 4/6/21- incomplete right bundle branch block is now present. Borderline criteria for inferior infarct are now present      I have independently reviewed and interpreted the EKG(s) documented above.          I, Nati Carney, am serving as a scribe to document services personally performed by Narayan Nathan MD based on my observation and the provider's statements to me. I, Dr. Nraayan Nathan, attest that Nati Carney is acting in a scribe capacity, has observed my performance of the services and has documented them in accordance with my direction.    Narayan Nathan MD  Emergency Medicine  [facility] EMERGENCY ROOM  [address] [id number] [phone]     Narayan Nathan MD  10/31/22 0417

## 2022-12-13 ENCOUNTER — OFFICE VISIT (OUTPATIENT)
Dept: URGENT CARE | Facility: URGENT CARE | Age: 37
End: 2022-12-13
Payer: COMMERCIAL

## 2022-12-13 VITALS
DIASTOLIC BLOOD PRESSURE: 80 MMHG | SYSTOLIC BLOOD PRESSURE: 120 MMHG | RESPIRATION RATE: 24 BRPM | HEART RATE: 106 BPM | TEMPERATURE: 98.3 F | OXYGEN SATURATION: 97 %

## 2022-12-13 DIAGNOSIS — E78.00 HYPERCHOLESTEREMIA: Primary | ICD-10-CM

## 2022-12-13 PROCEDURE — 99213 OFFICE O/P EST LOW 20 MIN: CPT | Performed by: FAMILY MEDICINE

## 2022-12-13 RX ORDER — DOCUSATE SODIUM 100 MG/1
CAPSULE, LIQUID FILLED ORAL
COMMUNITY
Start: 2022-12-09 | End: 2023-02-23

## 2022-12-13 RX ORDER — FLUOXETINE 10 MG/1
CAPSULE ORAL
COMMUNITY
Start: 2022-04-10 | End: 2022-12-28

## 2022-12-13 RX ORDER — HYDROXYZINE PAMOATE 25 MG/1
25-50 CAPSULE ORAL
COMMUNITY
Start: 2022-12-02 | End: 2023-01-12

## 2022-12-13 RX ORDER — POLYETHYLENE GLYCOL 3350 17 G/17G
17 POWDER, FOR SOLUTION ORAL
COMMUNITY
Start: 2022-12-04 | End: 2022-12-28

## 2022-12-13 RX ORDER — SENNOSIDES 8.6 MG/1
TABLET, COATED ORAL
COMMUNITY
Start: 2022-12-09 | End: 2022-12-28

## 2022-12-13 RX ORDER — LANOLIN ALCOHOL/MO/W.PET/CERES
100 CREAM (GRAM) TOPICAL
COMMUNITY
Start: 2022-12-03 | End: 2023-01-12

## 2022-12-13 RX ORDER — BUPRENORPHINE AND NALOXONE 8; 2 MG/1; MG/1
1 FILM, SOLUBLE BUCCAL; SUBLINGUAL 3 TIMES DAILY
COMMUNITY
Start: 2022-12-02 | End: 2022-12-28

## 2022-12-13 RX ORDER — LITHIUM CARBONATE 450 MG
450 TABLET, EXTENDED RELEASE ORAL AT BEDTIME
COMMUNITY
Start: 2022-12-02 | End: 2023-03-14

## 2022-12-13 RX ORDER — CLONIDINE HYDROCHLORIDE 0.1 MG/1
0.1 TABLET ORAL
COMMUNITY
Start: 2022-12-02 | End: 2023-01-12

## 2022-12-13 RX ORDER — ATORVASTATIN CALCIUM 40 MG/1
40 TABLET, FILM COATED ORAL DAILY
Qty: 30 TABLET | Refills: 0 | Status: SHIPPED | OUTPATIENT
Start: 2022-12-13 | End: 2023-01-12

## 2022-12-13 RX ORDER — RISPERIDONE 2 MG/1
2 TABLET ORAL
COMMUNITY
Start: 2022-12-02 | End: 2023-01-12

## 2022-12-13 RX ORDER — ASCORBIC ACID 100 MG
1 TABLET,CHEWABLE ORAL DAILY
COMMUNITY
Start: 2022-12-03 | End: 2023-01-12

## 2022-12-13 RX ORDER — ATORVASTATIN CALCIUM 40 MG/1
40 TABLET, FILM COATED ORAL DAILY
COMMUNITY
Start: 2022-11-09 | End: 2022-12-28

## 2022-12-13 NOTE — PROGRESS NOTES
SUBJECTIVE: Ted Borrero is a 37 year old male presenting with a chief complaint of elevated cholesterol, needs refilll meds.    Past Medical History:   Diagnosis Date     ADD (attention deficit disorder)      No Known Allergies  Social History     Tobacco Use     Smoking status: Every Day     Packs/day: 0.25     Types: Cigarettes     Smokeless tobacco: Never   Substance Use Topics     Alcohol use: Yes     Comment: occasional       ROS:  SKIN: no rash  GI: no vomiting    OBJECTIVE:  /80 (BP Location: Right arm, Patient Position: Sitting, Cuff Size: Adult Large)   Pulse 106   Temp 98.3  F (36.8  C) (Tympanic)   Resp 24   SpO2 97% GENERAL APPEARANCE: healthy, alert and no distress  EYES: EOMI,  PERRL, conjunctiva clear  RESP: lungs clear to auscultation - no rales, rhonchi or wheezes  CV: regular rates and rhythm, normal S1 S2, no murmur noted  SKIN: no suspicious lesions or rashes      ICD-10-CM    1. Hypercholesteremia  E78.00 atorvastatin (LIPITOR) 40 MG tablet          Fluids/Rest, f/u if worse/not any better

## 2022-12-28 ENCOUNTER — LAB (OUTPATIENT)
Dept: LAB | Facility: CLINIC | Age: 37
End: 2022-12-28
Payer: COMMERCIAL

## 2022-12-28 ENCOUNTER — OFFICE VISIT (OUTPATIENT)
Dept: BEHAVIORAL HEALTH | Facility: CLINIC | Age: 37
End: 2022-12-28
Payer: COMMERCIAL

## 2022-12-28 VITALS
SYSTOLIC BLOOD PRESSURE: 135 MMHG | HEART RATE: 105 BPM | WEIGHT: 280 LBS | DIASTOLIC BLOOD PRESSURE: 83 MMHG | BODY MASS INDEX: 41.47 KG/M2 | HEIGHT: 69 IN

## 2022-12-28 DIAGNOSIS — F11.20 OPIOID USE DISORDER, SEVERE, DEPENDENCE (H): ICD-10-CM

## 2022-12-28 DIAGNOSIS — Z11.3 SCREEN FOR STD (SEXUALLY TRANSMITTED DISEASE): ICD-10-CM

## 2022-12-28 DIAGNOSIS — F19.90 IVDU (INTRAVENOUS DRUG USER): ICD-10-CM

## 2022-12-28 DIAGNOSIS — F15.20 METHAMPHETAMINE USE DISORDER, SEVERE (H): ICD-10-CM

## 2022-12-28 DIAGNOSIS — F11.20 OPIOID USE DISORDER, SEVERE, DEPENDENCE (H): Primary | ICD-10-CM

## 2022-12-28 PROBLEM — E66.01 MORBID OBESITY (H): Status: ACTIVE | Noted: 2022-12-28

## 2022-12-28 LAB
ALBUMIN SERPL-MCNC: 3.9 G/DL (ref 3.4–5)
ALP SERPL-CCNC: 52 U/L (ref 40–150)
ALT SERPL W P-5'-P-CCNC: 36 U/L (ref 0–70)
ANION GAP SERPL CALCULATED.3IONS-SCNC: 5 MMOL/L (ref 3–14)
AST SERPL W P-5'-P-CCNC: 23 U/L (ref 0–45)
BILIRUB SERPL-MCNC: 0.3 MG/DL (ref 0.2–1.3)
BUN SERPL-MCNC: 20 MG/DL (ref 7–30)
CALCIUM SERPL-MCNC: 9.6 MG/DL (ref 8.5–10.1)
CHLORIDE BLD-SCNC: 106 MMOL/L (ref 94–109)
CO2 SERPL-SCNC: 26 MMOL/L (ref 20–32)
CREAT SERPL-MCNC: 0.76 MG/DL (ref 0.66–1.25)
GFR SERPL CREATININE-BSD FRML MDRD: >90 ML/MIN/1.73M2
GLUCOSE BLD-MCNC: 102 MG/DL (ref 70–99)
HBV SURFACE AB SERPL IA-ACNC: 1.31 M[IU]/ML
HBV SURFACE AB SERPL IA-ACNC: NONREACTIVE M[IU]/ML
HBV SURFACE AG SERPL QL IA: NONREACTIVE
HCV AB SERPL QL IA: NONREACTIVE
HIV 1+2 AB+HIV1 P24 AG SERPL QL IA: NONREACTIVE
POTASSIUM BLD-SCNC: 4 MMOL/L (ref 3.4–5.3)
PROT SERPL-MCNC: 8 G/DL (ref 6.8–8.8)
SODIUM SERPL-SCNC: 137 MMOL/L (ref 133–144)
T PALLIDUM AB SER QL: NONREACTIVE

## 2022-12-28 PROCEDURE — 82374 ASSAY BLOOD CARBON DIOXIDE: CPT

## 2022-12-28 PROCEDURE — 87491 CHLMYD TRACH DNA AMP PROBE: CPT | Performed by: NURSE PRACTITIONER

## 2022-12-28 PROCEDURE — 86803 HEPATITIS C AB TEST: CPT

## 2022-12-28 PROCEDURE — 86780 TREPONEMA PALLIDUM: CPT

## 2022-12-28 PROCEDURE — 87389 HIV-1 AG W/HIV-1&-2 AB AG IA: CPT

## 2022-12-28 PROCEDURE — 99214 OFFICE O/P EST MOD 30 MIN: CPT | Performed by: NURSE PRACTITIONER

## 2022-12-28 PROCEDURE — 87340 HEPATITIS B SURFACE AG IA: CPT

## 2022-12-28 PROCEDURE — 87591 N.GONORRHOEAE DNA AMP PROB: CPT | Performed by: NURSE PRACTITIONER

## 2022-12-28 PROCEDURE — 86706 HEP B SURFACE ANTIBODY: CPT

## 2022-12-28 PROCEDURE — 36415 COLL VENOUS BLD VENIPUNCTURE: CPT

## 2022-12-28 RX ORDER — MEPERIDINE HYDROCHLORIDE 25 MG/ML
25 INJECTION INTRAMUSCULAR; INTRAVENOUS; SUBCUTANEOUS EVERY 30 MIN PRN
Status: CANCELLED | OUTPATIENT
Start: 2022-12-28

## 2022-12-28 RX ORDER — ALBUTEROL SULFATE 0.83 MG/ML
2.5 SOLUTION RESPIRATORY (INHALATION)
Status: CANCELLED | OUTPATIENT
Start: 2022-12-28

## 2022-12-28 RX ORDER — LIDOCAINE HYDROCHLORIDE 10 MG/ML
2 INJECTION, SOLUTION EPIDURAL; INFILTRATION; INTRACAUDAL; PERINEURAL ONCE
Status: CANCELLED | OUTPATIENT
Start: 2022-12-28 | End: 2022-12-28

## 2022-12-28 RX ORDER — DIPHENHYDRAMINE HYDROCHLORIDE 50 MG/ML
50 INJECTION INTRAMUSCULAR; INTRAVENOUS
Status: CANCELLED
Start: 2022-12-28

## 2022-12-28 RX ORDER — METHYLPREDNISOLONE SODIUM SUCCINATE 125 MG/2ML
125 INJECTION, POWDER, LYOPHILIZED, FOR SOLUTION INTRAMUSCULAR; INTRAVENOUS
Status: CANCELLED
Start: 2022-12-28

## 2022-12-28 RX ORDER — ALBUTEROL SULFATE 90 UG/1
1-2 AEROSOL, METERED RESPIRATORY (INHALATION)
Status: CANCELLED
Start: 2022-12-28

## 2022-12-28 RX ORDER — BUPRENORPHINE AND NALOXONE 8; 2 MG/1; MG/1
1 FILM, SOLUBLE BUCCAL; SUBLINGUAL 3 TIMES DAILY
Qty: 45 FILM | Refills: 0 | Status: SHIPPED | OUTPATIENT
Start: 2023-01-02 | End: 2023-01-12

## 2022-12-28 RX ORDER — POLYETHYLENE GLYCOL 3350 17 G/17G
1 POWDER, FOR SOLUTION ORAL DAILY PRN
COMMUNITY

## 2022-12-28 RX ORDER — EPINEPHRINE 1 MG/ML
0.3 INJECTION, SOLUTION, CONCENTRATE INTRAVENOUS EVERY 5 MIN PRN
Status: CANCELLED | OUTPATIENT
Start: 2022-12-28

## 2022-12-28 ASSESSMENT — PATIENT HEALTH QUESTIONNAIRE - PHQ9: SUM OF ALL RESPONSES TO PHQ QUESTIONS 1-9: 11

## 2022-12-28 NOTE — PROGRESS NOTES
[facility] Initial Visit    ASSESSMENT/PLAN                                                      1. Opioid use disorder, severe, dependence (H)  - pt reporting 1.5 yr h/o daily fentanyl use, opioid use starting at age 19 yo, stabilized on Suboxone 8 mg TID after hospitalization for overdose. Interested in starting Sublocade.   - Plan to Continue 8 mg SL TID. Then transition to Sublocade. Discussed MOA and common SE. Therapy order placed today. Sublocade PA requested.   - consider monitoring EKG and QT interval, pt current taking Lithium and risperidone which have some risk of prolonged QT interval in combination with Sublocade. EKG completed on 10/30/22 and reviewed, SR with RBBB, QT = 384 ms, QTc = 469 ms .   - Infectious disease screening and baseline lab work as below.   - HIV Antigen Antibody Combo; Future  - Hepatitis C Screen Reflex to HCV RNA Quant and Genotype; Future  - Comprehensive metabolic panel (BMP + Alb, Alk Phos, ALT, AST, Total. Bili, TP); Future  - buprenorphine HCl-naloxone HCl (SUBOXONE) 8-2 MG per film; Place 1 Film under the tongue 3 times daily  Dispense: 45 Film; Refill: 0    2. Methamphetamine use disorder, severe (H)  - H/o daily use, Denies recent use since 10/30/22, denies cravings.   - continue programming at [facility]     3. IVDU (intravenous drug user)  - ID screening as below   - HIV Antigen Antibody Combo; Future  - Hepatitis C Screen Reflex to HCV RNA Quant and Genotype; Future  - Comprehensive metabolic panel (BMP + Alb, Alk Phos, ALT, AST, Total. Bili, TP); Future    4. Screen for STD (sexually transmitted disease)  Asymptomatic screening today per pt request.   - HIV Antigen Antibody Combo; Future  - Hepatitis C Screen Reflex to HCV RNA Quant and Genotype; Future  - NEISSERIA GONORRHOEA PCR  - CHLAMYDIA TRACHOMATIS PCR  - Treponema Abs w Reflex to RPR and Titer; Future  - Hepatitis B surface antigen; Future  - Hepatitis B Surface Antibody;  "Future       Return in about 2 weeks (around 1/11/2023) for Follow up, with me, in person.    Patient counseling completed today:  Discussed mechanism of action, potential risks/benefits/side effects of medications and other recommendations above.    Discussed risk of precipitated withdrawal with initiation of buprenorphine in the presence of full opioid agonists.    Reviewed directions for initiation of buprenorphine to reduce risk of precipitated withdrawal and maximize efficacy.    Harm reduction counseling including never use alone, availability of naloxone, avoiding combination of opioids with benzodiazepines, alcohol, or other sedatives, safer administration.      Discussed importance of avoiding isolation, building a network of supportive relationships, avoiding people/places/things associated with past use to reduce risk of relapse; including motivational interviewing regarding psychosocial treatment for addiction.     SUBJECTIVE                                                      CC/HPI:  Ted Borrero is a 37 year old male with PMH of hypercholesteremia, depression, schizoaffective disorder, PTSD, methamphetamine use disorder and opioid use disorder who presents to the [facility] for initial visit.      Brief History:  Ted Borrero was first seen in [facility] on 12/28/22. They were referred by Kelly Oleary.   Patient's reasons for seeking treatment on this date include \"I want to switch from taking Suboxone to the Sublocade injection.\" Reports opioid use starting at 17 yo, occasionally, pills or heroin. Reports daily use starting within the past 1.5 yrs, perc 30 pills. Reports he was using methamphetamine daily. Denies benzodiazapine use history.  Reports history of binge drinking about 5 years ago. Reports he was started on Suboxone in early November at [facility] following an overdose. Has taken it daily since then. Taking 8 mg SL TID. Reports cravings well controlled. Denies " No adverse SE.     Substance Use History :  Opioids:   Age at first use: 18  Current use: substance: Fentanyl; quantity 1 every few days; route: smoke;timing of last use: 10/30/22;      IV drug use: Yes: once on 10/30/22   History of overdose: Yes: twice last time on 10/30/22  Previous residential or outpatient treatments for addiction : Yes: 4 times  Previous medication treatments for addiction: Yes: Suboxone  Longest period of sobriety: currently   Medical complications related to substance use: denies  Hepatitis C: negative per pt  HIV: negative per pt    Taking buprenorphine? Yes:  As prescribed? Yes:   Number of buprenorphine films/tablets remaining currently: unsure of amount but does have some  Side effects related to buprenorphine No   Narcan currently available: Yes    DSM-5 criteria met:  Taken in larger amounts/greater time spent in behavior over longer period of time than intended,Yes: met for opioids and methamphetamines    Persistent desire or unsuccessful efforts to cut down or control use/behavior, Yes: met for opioids and methamphetamines    A great deal of time is spent in activities necessary to obtain the substance/participate in the behavior or recover from its effects, Yes: met for opioids and methamphetamines    Recurrent use/behavior resulting in failure to fulfill major role obligations at work, school, or home, Yes: met for opioids and methamphetamines    Continued use/behavior despite having persistent or recurrent social or interpersonal problems caused or exacerbated by effects of use/behavior,Yes: met for opioids and methamphetamines    Important social, occupational, or recreational activities are given up or reduced because of use/behavior, Yes: met for opioids and methamphetamines    Recurrent use/behavior in situations in which it is physically hazardous, Yes: met for opioids and methamphetamines    Continued use/behavior despite knowledge of having a persistent or recurrent physical  or psychological problem that is likely to have been caused or exacerbated by use/behavior, Yes: met for opioids and methamphetamines      Other Addiction History:  Stimulants (cocaine, methamphetamine, MDMA/ecstasy)   Meth-daily use until 10/30/22; Cocaine- occasional   Sedatives/hypnotics/anxiolytics: (benzodiazepines, GHB, Ambien, phenobarbital)  denies  Alcohol:   denies  Nicotine: (cigarettes, vaping, chew/snuff)  0.25 pack  Cannabis:   Occasional   Hallucinogens/Dissociatives: (acid, mushrooms, ketamine)  occasional  Eating disorder:  denies  Gambling:   denies    [place] Prescription Drug Monitoring Program Reviewed:  Yes    12/19/2022  3   12/02/2022  Suboxone 8 Mg-2 MG SL Film  45.00  15       Past Medical History:   Diagnosis Date     ADD (attention deficit disorder)        PAST PSYCHIATRIC HISTORY:  Diagnoses- PTSD and schizoaffective   Suicide Attempts: Yes 2.5 years ago, one time   Hospitalizations: No     PHQ 12/28/2022   PHQ-9 Total Score 11   Q9: Thoughts of better off dead/self-harm past 2 weeks Not at all       If PHQ-9 score of 15 or higher, has Recovery Clinic therapist or provider been notified? N/A    Any current suicidal ideation? No  If yes, has [facility] therapist or provider been notified? N/A    Mental health provider: Janis Tony (follow up on MH referral if needed)    No past surgical history on file.    Medications:  atorvastatin (LIPITOR) 40 MG tablet, Take 1 tablet (40 mg) by mouth daily for 30 days  cloNIDine (CATAPRES) 0.1 MG tablet, Take 0.1 mg by mouth  docusate sodium (COLACE) 100 MG capsule,   hydrOXYzine (VISTARIL) 25 MG capsule, Take 25-50 mg by mouth  lithium (ESKALITH CR/LITHOBID) 450 MG CR tablet,   Multiple Vitamin (QUINTABS) TABS, Take 1 tablet by mouth daily  polyethylene glycol (MIRALAX) 17 GM/Dose powder, Take 1 capful by mouth daily  risperiDONE (RISPERDAL) 2 MG tablet, Take 2 mg by mouth  thiamine (B-1) 100 MG tablet, Take 100 mg by  "mouth  naloxone (NARCAN) 4 MG/0.1ML nasal spray, Spray 1 spray (4 mg) into one nostril alternating nostrils as needed for opioid reversal every 2-3 minutes until assistance arrives (Patient not taking: Reported on 12/13/2022)    No current facility-administered medications on file prior to visit.      No Known Allergies    No family history on file.      Social History  [facility]  Employment status: Unemployed, not seeking work  Relationship status: Partnered  Children: 1 child 5 YO son  Legal: probation   Insurance needs: active  Contact information up to date? yes    3rd Party Involvement none today (please obtain BIRD if pt would like to include)    REVIEW OF SYSTEMS:  General: No recent fevers.   Eyes:  No vision concerns.  No jaundice.    Resp: No coughing, wheezing or shortness of breath  CV: No chest pains or palpitations  GI: No complaints  : No urinary frequency or dysuria, no discharge  Musculoskeletal: No significant muscle or joint pains.  No edema  Neurologic: No numbness, tingling, weakness, problems with balance or coordination  Psychiatric: No acute concerns other than as above.   Skin: No rashes or areas of acute infection    OBJECTIVE                                                      /83   Pulse 105   Ht 1.753 m (5' 9\")   Wt 127 kg (280 lb)   BMI 41.35 kg/m      Physical Exam  Vitals and nursing note reviewed.   Constitutional:       General: He is not in acute distress.     Appearance: Normal appearance. He is not ill-appearing or diaphoretic.   Eyes:      General: No scleral icterus.     Extraocular Movements: Extraocular movements intact.      Conjunctiva/sclera: Conjunctivae normal.   Pulmonary:      Effort: Pulmonary effort is normal.   Neurological:      Mental Status: He is alert and oriented to person, place, and time.   Psychiatric:         Mood and Affect: Mood normal.         Behavior: Behavior normal.         Thought Content: Thought " content normal.         Judgment: Judgment normal.         Labs:    UDS: Urine Drug Screen Results  AMP: Negative  BAR: Negative  BUP: Positive  BZO: Negative  JONATHON: Negative  mAMP: Negative  MDMA : Negative  MTD: Negative  [id number]: Negative  OXY: Negative  PCP : Negative  THC : Negative  *POC urine drug screen does not screen for Fentanyl    Recent Results (from the past 720 hour(s))   HIV Antigen Antibody Combo    Collection Time: 12/28/22  2:54 PM   Result Value Ref Range    HIV Antigen Antibody Combo Nonreactive Nonreactive   Hepatitis C Screen Reflex to HCV RNA Quant and Genotype    Collection Time: 12/28/22  2:54 PM   Result Value Ref Range    Hepatitis C Antibody Nonreactive Nonreactive   Comprehensive metabolic panel (BMP + Alb, Alk Phos, ALT, AST, Total. Bili, TP)    Collection Time: 12/28/22  2:54 PM   Result Value Ref Range    Sodium 137 133 - 144 mmol/L    Potassium 4.0 3.4 - 5.3 mmol/L    Chloride 106 94 - 109 mmol/L    Carbon Dioxide (CO2) 26 20 - 32 mmol/L    Anion Gap 5 3 - 14 mmol/L    Urea Nitrogen 20 7 - 30 mg/dL    Creatinine 0.76 0.66 - 1.25 mg/dL    Calcium 9.6 8.5 - 10.1 mg/dL    Glucose 102 (H) 70 - 99 mg/dL    Alkaline Phosphatase 52 40 - 150 U/L    AST 23 0 - 45 U/L    ALT 36 0 - 70 U/L    Protein Total 8.0 6.8 - 8.8 g/dL    Albumin 3.9 3.4 - 5.0 g/dL    Bilirubin Total 0.3 0.2 - 1.3 mg/dL    GFR Estimate >90 >60 mL/min/1.73m2   Treponema Abs w Reflex to RPR and Titer    Collection Time: 12/28/22  2:54 PM   Result Value Ref Range    Treponema Antibody Total Nonreactive Nonreactive   Hepatitis B surface antigen    Collection Time: 12/28/22  2:54 PM   Result Value Ref Range    Hepatitis B Surface Antigen Nonreactive Nonreactive   Hepatitis B Surface Antibody    Collection Time: 12/28/22  2:54 PM   Result Value Ref Range    Hepatitis B Surface Antibody Instrument Value 1.31 <8.00 m[IU]/mL    Hepatitis B Surface Antibody Nonreactive        EDITH Gallardo [facility]  [facility]  [address], [address]  [place] [address]  [phone]

## 2022-12-29 ENCOUNTER — TELEPHONE (OUTPATIENT)
Dept: BEHAVIORAL HEALTH | Facility: CLINIC | Age: 37
End: 2022-12-29
Payer: MEDICAID

## 2022-12-29 LAB
C TRACH DNA SPEC QL NAA+PROBE: NEGATIVE
N GONORRHOEA DNA SPEC QL NAA+PROBE: NEGATIVE

## 2022-12-29 NOTE — TELEPHONE ENCOUNTER
Requesting Sublocade PA.     - I am certified to treat addictions under DATA 2000 waiver, XDEA # [id number]  - I have reviewed recommendations for comprehensive treatment plan with the patient  - I have reviewed the patient's medications comprehensively  and provided education to the patient on risks associated with concurrent use of benzodiazepines, alcohol, other sedatives with opioids  - I have recommended concomitant psychosocial support  - [facility] where Sublocade will be administered is in compliance with all aspects of [facility] program.   - Patient meets DSM-5 criteria for moderate or severe opioid use disorder  - Patient has been prescribed buprenorphine 8-24mg/day for >1 week  - Patient will discontinue sublingual buprenorphine when steady state achieved after starting Sublocade  - Patient does not have severe hepatic impairment.   - Patient does not have a history of long QT syndrome  - Patient does not take any antiarrhythmic medications. Does take Lithium (frequency not defined) and Risperidone (rare)  which can prolong QT interval   - Urine Drug Screen on 12/28/22 was positive for buprenorphine  - Patient will not be receiving methadone  - Patient will not be receiving any other long acting products for the treatment of opioid use disorder.     EDITH Gallardo CNP on 12/29/2022 at 10:15 AM

## 2023-01-05 ENCOUNTER — TELEPHONE (OUTPATIENT)
Dept: BEHAVIORAL HEALTH | Facility: CLINIC | Age: 38
End: 2023-01-05

## 2023-01-05 NOTE — TELEPHONE ENCOUNTER
Reason for call:  Other   Patient called regarding (reason for call): call back  Additional comments: Reach out to pt re: sublucate medication questions.     Phone number to reach patient:  Home number on file [phone] (home)    Best Time:  Any    Can we leave a detailed message on this number?  YES    Travel screening: Not Applicable

## 2023-01-11 NOTE — PROGRESS NOTES
[facility] Return Visit    ASSESSMENT/PLAN                                                    1. Opioid use disorder, severe, dependence (H)  Controlled.  Pt remains interested in transfer to Sublocade.  Awaiting PA response.   Continue SL buprenorphine 24mg/day for now  Start Sublocade when approved  Discontinue scheduled buprenorphine after Sublocade #1.   Recommend he be seen in  in conjunction with Sublocade injection for evaluation/treatment of symptoms  Continue programming through [facility]  - buprenorphine HCl-naloxone HCl (SUBOXONE) 8-2 MG per film; Place 1 Film under the tongue 3 times daily  Dispense: 90 Film; Refill: 0    2. Methamphetamine use disorder, severe (H)  Continue programming through [facility]    3. Tobacco use  Pt is chewing tobacco, smoking, and using ENDS.  Not ready to quit at this time.      4. Healthcare maintenance  Pt requested continued MVI and to start vitamin d. Encouraged well balanced diet.   - Multiple Vitamin (QUINTABS) TABS; Take 1 tablet by mouth daily  Dispense: 90 tablet; Refill: 3  - vitamin D3 (CHOLECALCIFEROL) 50 mcg (2000 units) tablet; Take 1 tablet (50 mcg) by mouth daily  Dispense: 90 tablet; Refill: 3    Return in about 4 weeks (around 2/9/2023) for Follow up, in person.    Patient counseling completed today:  Discussed mechanism of action, potential risks/benefits/side effects of medications and other recommendations above.    Harm reduction counseling including never use alone, availability of naloxone, avoiding combination of opioids with benzodiazepines, alcohol, or other sedatives, safer administration.      Discussed importance of avoiding isolation, building a network of supportive relationships, avoiding people/places/things associated with past use to reduce risk of relapse; including motivational interviewing regarding psychosocial treatment for addiction.     SUBJECTIVE                                                   "    CC/HPI:  Ted Borrero is a 37 year old male with PMH of hypercholesteremia, depression, schizoaffective disorder, PTSD, methamphetamine use disorder and opioid use disorder on buprenorphine who presents to the Recovery Clinic for return visit.      Brief History:  Ted Borrero was first seen in Recovery Clinic on 12/28/22. They were referred by Kelly Oleary.   Patient's reasons for seeking treatment on this date include \"I want to switch from taking Suboxone to the Sublocade injection.\" Reports opioid use starting at 17 yo, occasionally, pills or heroin. Reports daily use starting within the past 1.5 yrs, perc 30 pills. Reports he was using methamphetamine daily. Denies benzodiazapine use history.  Reports history of binge drinking about 5 years ago. Reports he was started on Suboxone in early November at [facility] following an overdose. Has taken it daily since then. Taking 8 mg SL TID. Reports cravings well controlled. Denies adverse SE. Continued buprenorphine through  after initial visit.     Substance Use History :  Opioids:   Age at first use: 18, started with occasional use of rx opioid pills, progressed to heroin use.  ~2020 began using unregulated opioid pills/fentanyl   Current use: substance: Fentanyl; quantity daily; route: smoke;timing of last use: 10/30/22;      IV drug use: Yes: once on 10/30/22   History of overdose: Yes: twice last time on 10/30/22  Previous residential or outpatient treatments for addiction : Yes: 4 times  Previous medication treatments for addiction: Yes: Suboxone  Longest period of sobriety: currently   Medical complications related to substance use: denies  Hepatitis C: 12/28/22 HCV ab nonreactive  HIV: 12/28/22 HIV ag/ab nonreactive    Other Addiction History:  Stimulants   Meth-daily use until 10/30/22; Cocaine- occasional   Sedatives/hypnotics/anxiolytics:   denies  Alcohol:   denies  Nicotine:   0.25 pack/day  Cannabis:   Occasional "   Hallucinogens/Dissociatives:   occasional  Eating disorder:  denies  Gambling:   denies        A/P from most recent  visit 12/28/22:  1. Opioid use disorder, severe, dependence (H)  - pt reporting 1.5 yr h/o daily fentanyl use, opioid use starting at age 17 yo, stabilized on Suboxone 8 mg TID after hospitalization for overdose. Interested in starting Sublocade.   - Plan to Continue 8 mg SL TID. Then transition to Sublocade. Discussed MOA and common SE. Therapy order placed today. Sublocade PA requested.   - consider monitoring EKG and QT interval, pt current taking Lithium and risperidone which have some risk of prolonged QT interval in combination with Sublocade. EKG completed on 10/30/22 and reviewed, SR with ERIC, QT = 384 ms, QTc = 469 ms .   - Infectious disease screening and baseline lab work as below.   - HIV Antigen Antibody Combo; Future  - Hepatitis C Screen Reflex to HCV RNA Quant and Genotype; Future  - Comprehensive metabolic panel (BMP + Alb, Alk Phos, ALT, AST, Total. Bili, TP); Future  - buprenorphine HCl-naloxone HCl (SUBOXONE) 8-2 MG per film; Place 1 Film under the tongue 3 times daily  Dispense: 45 Film; Refill: 0     2. Methamphetamine use disorder, severe (H)  - H/o daily use, Denies recent use since 10/30/22, denies cravings.   - continue programming at [facility]      3. IVDU (intravenous drug user)  - ID screening as below   - HIV Antigen Antibody Combo; Future  - Hepatitis C Screen Reflex to HCV RNA Quant and Genotype; Future  - Comprehensive metabolic panel (BMP + Alb, Alk Phos, ALT, AST, Total. Bili, TP); Future     4. Screen for STD (sexually transmitted disease)  Asymptomatic screening today per pt request.   - HIV Antigen Antibody Combo; Future  - Hepatitis C Screen Reflex to HCV RNA Quant and Genotype; Future  - NEISSERIA GONORRHOEA PCR  - CHLAMYDIA TRACHOMATIS PCR  - Treponema Abs w Reflex to RPR and Titer; Future  - Hepatitis B surface antigen; Future  - Hepatitis B Surface  Antibody; Future                   Return in about 2 weeks (around 1/11/2023) for Follow up, with me, in person.        1/11/23 visit:  Pt states he has continued to take buprenorphine 24mg/day as prescribed.      [place] Prescription Drug Monitoring Program Reviewed:  Yes, as expected    Past Medical History:   Diagnosis Date     ADD (attention deficit disorder)        PAST PSYCHIATRIC HISTORY:  Diagnoses- PTSD and schizoaffective   Suicide Attempts: Yes 2.5 years ago, one time   Hospitalizations: No     PHQ 12/28/2022 1/12/2023   PHQ-9 Total Score 11 10   Q9: Thoughts of better off dead/self-harm past 2 weeks Not at all Not at all       Mental health provider: Crhis Tony; transferring to a different provider [facility] 2/2023    No past surgical history on file.    Medications:  atorvastatin (LIPITOR) 40 MG tablet, Take 1 tablet (40 mg) by mouth daily for 30 days  buprenorphine HCl-naloxone HCl (SUBOXONE) 8-2 MG per film, Place 1 Film under the tongue 3 times daily  cloNIDine (CATAPRES) 0.1 MG tablet, Take 0.1 mg by mouth  docusate sodium (COLACE) 100 MG capsule,   hydrOXYzine (VISTARIL) 25 MG capsule, Take 25-50 mg by mouth  lithium (ESKALITH CR/LITHOBID) 450 MG CR tablet,   Multiple Vitamin (QUINTABS) TABS, Take 1 tablet by mouth daily  naloxone (NARCAN) 4 MG/0.1ML nasal spray, Spray 1 spray (4 mg) into one nostril alternating nostrils as needed for opioid reversal every 2-3 minutes until assistance arrives (Patient not taking: Reported on 12/13/2022)  polyethylene glycol (MIRALAX) 17 GM/Dose powder, Take 1 capful by mouth daily  risperiDONE (RISPERDAL) 2 MG tablet, Take 2 mg by mouth  thiamine (B-1) 100 MG tablet, Take 100 mg by mouth    No current facility-administered medications on file prior to visit.      No Known Allergies    No family history on file.      Social History  Housing status: in a sober house, [place]  Employment status: Unemployed, not seeking  work  Relationship status: Partnered  Children: 1 child 7 YO son, lives with mom/pt's SO of 12 years  Legal: probation       REVIEW OF SYSTEMS:  No other concerns    OBJECTIVE                                                      BP (!) 135/90   Pulse 97     Physical Exam  Vitals and nursing note reviewed.   Constitutional:       General: He is not in acute distress.     Appearance: Normal appearance. He is not ill-appearing or diaphoretic.   Eyes:      General: No scleral icterus.     Extraocular Movements: Extraocular movements intact.      Conjunctiva/sclera: Conjunctivae normal.   Pulmonary:      Effort: Pulmonary effort is normal.   Neurological:      Mental Status: He is alert and oriented to person, place, and time.   Psychiatric:         Mood and Affect: Mood normal.         Behavior: Behavior normal.         Thought Content: Thought content normal.         Judgment: Judgment normal.         Labs:    UDS: Urine Drug Screen Results  AMP: Negative  BAR: Negative  BUP: Positive  BZO: Negative ([place])  JONATHON: Negative  mAMP: Negative  MDMA : Negative  MTD: Negative  [id number]: Negative  OXY: Negative  PCP : Negative  THC : Negative  *POC urine drug screen does not screen for Fentanyl    Recent Results (from the past 720 hour(s))   HIV Antigen Antibody Combo    Collection Time: 12/28/22  2:54 PM   Result Value Ref Range    HIV Antigen Antibody Combo Nonreactive Nonreactive   Hepatitis C Screen Reflex to HCV RNA Quant and Genotype    Collection Time: 12/28/22  2:54 PM   Result Value Ref Range    Hepatitis C Antibody Nonreactive Nonreactive   Comprehensive metabolic panel (BMP + Alb, Alk Phos, ALT, AST, Total. Bili, TP)    Collection Time: 12/28/22  2:54 PM   Result Value Ref Range    Sodium 137 133 - 144 mmol/L    Potassium 4.0 3.4 - 5.3 mmol/L    Chloride 106 94 - 109 mmol/L    Carbon Dioxide (CO2) 26 20 - 32 mmol/L    Anion Gap 5 3 - 14 mmol/L    Urea Nitrogen 20 7 - 30 mg/dL    Creatinine 0.76 0.66 - 1.25 mg/dL     Calcium 9.6 8.5 - 10.1 mg/dL    Glucose 102 (H) 70 - 99 mg/dL    Alkaline Phosphatase 52 40 - 150 U/L    AST 23 0 - 45 U/L    ALT 36 0 - 70 U/L    Protein Total 8.0 6.8 - 8.8 g/dL    Albumin 3.9 3.4 - 5.0 g/dL    Bilirubin Total 0.3 0.2 - 1.3 mg/dL    GFR Estimate >90 >60 mL/min/1.73m2   Treponema Abs w Reflex to RPR and Titer    Collection Time: 12/28/22  2:54 PM   Result Value Ref Range    Treponema Antibody Total Nonreactive Nonreactive   Hepatitis B surface antigen    Collection Time: 12/28/22  2:54 PM   Result Value Ref Range    Hepatitis B Surface Antigen Nonreactive Nonreactive   Hepatitis B Surface Antibody    Collection Time: 12/28/22  2:54 PM   Result Value Ref Range    Hepatitis B Surface Antibody Instrument Value 1.31 <8.00 m[IU]/mL    Hepatitis B Surface Antibody Nonreactive    NEISSERIA GONORRHOEA PCR    Collection Time: 12/28/22  4:01 PM    Specimen: Urine, Voided   Result Value Ref Range    Neisseria gonorrhoeae Negative Negative   CHLAMYDIA TRACHOMATIS PCR    Collection Time: 12/28/22  4:01 PM    Specimen: Urine, Voided   Result Value Ref Range    Chlamydia trachomatis Negative Negative         Shari Sanchez MD  Addiction Medicine  [address] [address]  [phone]

## 2023-01-12 ENCOUNTER — OFFICE VISIT (OUTPATIENT)
Dept: BEHAVIORAL HEALTH | Facility: CLINIC | Age: 38
End: 2023-01-12
Payer: MEDICAID

## 2023-01-12 VITALS — DIASTOLIC BLOOD PRESSURE: 90 MMHG | SYSTOLIC BLOOD PRESSURE: 135 MMHG | HEART RATE: 97 BPM

## 2023-01-12 DIAGNOSIS — F11.20 OPIOID USE DISORDER, SEVERE, DEPENDENCE (H): Primary | ICD-10-CM

## 2023-01-12 DIAGNOSIS — Z00.00 HEALTHCARE MAINTENANCE: ICD-10-CM

## 2023-01-12 DIAGNOSIS — F15.20 METHAMPHETAMINE USE DISORDER, SEVERE (H): ICD-10-CM

## 2023-01-12 DIAGNOSIS — Z72.0 TOBACCO USE: ICD-10-CM

## 2023-01-12 PROCEDURE — 99204 OFFICE O/P NEW MOD 45 MIN: CPT | Performed by: FAMILY MEDICINE

## 2023-01-12 RX ORDER — RISPERIDONE 2 MG/1
2 TABLET ORAL 2 TIMES DAILY
COMMUNITY
Start: 2023-01-12

## 2023-01-12 RX ORDER — BUPRENORPHINE AND NALOXONE 8; 2 MG/1; MG/1
1 FILM, SOLUBLE BUCCAL; SUBLINGUAL 3 TIMES DAILY
Qty: 90 FILM | Refills: 0 | Status: SHIPPED | OUTPATIENT
Start: 2023-01-12 | End: 2023-02-28

## 2023-01-12 RX ORDER — ASCORBIC ACID 100 MG
1 TABLET,CHEWABLE ORAL DAILY
Qty: 90 TABLET | Refills: 3 | Status: SHIPPED | OUTPATIENT
Start: 2023-01-12

## 2023-01-12 RX ORDER — HYDROXYZINE PAMOATE 25 MG/1
25-50 CAPSULE ORAL 3 TIMES DAILY PRN
COMMUNITY
Start: 2023-01-12 | End: 2023-02-23

## 2023-01-12 RX ORDER — CHOLECALCIFEROL (VITAMIN D3) 50 MCG
1 TABLET ORAL DAILY
Qty: 90 TABLET | Refills: 3 | Status: SHIPPED | OUTPATIENT
Start: 2023-01-12

## 2023-01-12 RX ORDER — CLONIDINE HYDROCHLORIDE 0.1 MG/1
0.1 TABLET ORAL AT BEDTIME
COMMUNITY
Start: 2023-01-12 | End: 2023-02-23

## 2023-01-12 ASSESSMENT — PATIENT HEALTH QUESTIONNAIRE - PHQ9: SUM OF ALL RESPONSES TO PHQ QUESTIONS 1-9: 10

## 2023-01-12 NOTE — NURSING NOTE
[facility]      Rooming information:  Approximate last use of full opioid agonist: 10/30/22  Taking buprenorphine?  As prescribed? Yes:   Number of buprenorphine films/tablets remaining currently: some , just got refill  Side effects related to buprenorphine (constipation, dry mouth, sedation?) No   Narcan currently available: Yes  Other recent substance use:    Denies  NICOTINE-Yes: chew,vape,cigarettes  If using nicotine, ready to quit? Yes: working on that    Point of care urine drug screen positive for:  Urine Drug Screen Results  AMP: Negative  BAR: Negative  BUP: Positive  BZO: Negative ([place])  JONATHON: Negative  mAMP: Negative  MDMA : Negative  MTD: Negative  [id number]: Negative  OXY: Negative  PCP : Negative  THC : Negative  *POC urine drug screen does not screen for Fentanyl      PHQ Assesment Total Score(s) 1/12/2023   PHQ-9 Score 10   Some recent data might be hidden       If PHQ-9 score of 15 or higher, has Recovery Clinic therapist or provider been notified? No    Any current suicidal ideation? No  If yes, has Recovery Clinic therapist or provider been notified? N/A    Primary care provider: Physician No Ref-Primary     Mental health provider: teena (follow up on MH referral if needed)    Insurance needs: active    Housing needs: stable    Contact information up to date?     3rd Party Involvement no today (please obtain BIRD if pt would like to include)    Sammie Matthews MA  January 12, 2023  12:33 PM

## 2023-01-20 NOTE — TELEPHONE ENCOUNTER
Can we get an update on the Sublocade PA for this patient please. Thank you.     Italia Fernandez RN on 1/20/2023 at 8:54 AM

## 2023-01-23 NOTE — TELEPHONE ENCOUNTER
Writer informed patient via [email or web address] message that Sublocade prior authorization was approved. Provided scheduling # for [facility], [phone]. Reiterated to patient the importance of taking buprenorphine as prescribed without opiate use for at least 7 days leading up to Sublocade injection. Per last visit notes from 01/12/23 writer also reminded patient to-Discontinue scheduled buprenorphine after Sublocade #1.   Recommend he be seen in  in conjunction with Sublocade injection for evaluation/treatment of symptoms.    Italia Fernandez RN on 1/23/2023 at 9:54 AM

## 2023-01-25 ENCOUNTER — INFUSION THERAPY VISIT (OUTPATIENT)
Dept: INFUSION THERAPY | Facility: CLINIC | Age: 38
End: 2023-01-25
Attending: FAMILY MEDICINE
Payer: MEDICAID

## 2023-01-25 VITALS
HEART RATE: 87 BPM | DIASTOLIC BLOOD PRESSURE: 72 MMHG | RESPIRATION RATE: 20 BRPM | OXYGEN SATURATION: 94 % | SYSTOLIC BLOOD PRESSURE: 108 MMHG | TEMPERATURE: 98.5 F

## 2023-01-25 DIAGNOSIS — F11.20 OPIOID USE DISORDER, SEVERE, DEPENDENCE (H): Primary | ICD-10-CM

## 2023-01-25 PROCEDURE — 250N000009 HC RX 250: Performed by: NURSE PRACTITIONER

## 2023-01-25 PROCEDURE — 250N000011 HC RX IP 250 OP 636: Performed by: NURSE PRACTITIONER

## 2023-01-25 PROCEDURE — 96372 THER/PROPH/DIAG INJ SC/IM: CPT | Performed by: NURSE PRACTITIONER

## 2023-01-25 RX ORDER — ALBUTEROL SULFATE 90 UG/1
1-2 AEROSOL, METERED RESPIRATORY (INHALATION)
Status: CANCELLED
Start: 2023-02-22

## 2023-01-25 RX ORDER — ALBUTEROL SULFATE 0.83 MG/ML
2.5 SOLUTION RESPIRATORY (INHALATION)
Status: CANCELLED | OUTPATIENT
Start: 2023-02-22

## 2023-01-25 RX ORDER — LIDOCAINE HYDROCHLORIDE 10 MG/ML
2 INJECTION, SOLUTION EPIDURAL; INFILTRATION; INTRACAUDAL; PERINEURAL ONCE
Status: CANCELLED | OUTPATIENT
Start: 2023-02-22 | End: 2023-02-22

## 2023-01-25 RX ORDER — LIDOCAINE HYDROCHLORIDE 10 MG/ML
2 INJECTION, SOLUTION EPIDURAL; INFILTRATION; INTRACAUDAL; PERINEURAL ONCE
Status: COMPLETED | OUTPATIENT
Start: 2023-01-25 | End: 2023-01-25

## 2023-01-25 RX ORDER — EPINEPHRINE 1 MG/ML
0.3 INJECTION, SOLUTION, CONCENTRATE INTRAVENOUS EVERY 5 MIN PRN
Status: CANCELLED | OUTPATIENT
Start: 2023-02-22

## 2023-01-25 RX ORDER — DIPHENHYDRAMINE HYDROCHLORIDE 50 MG/ML
50 INJECTION INTRAMUSCULAR; INTRAVENOUS
Status: CANCELLED
Start: 2023-02-22

## 2023-01-25 RX ORDER — METHYLPREDNISOLONE SODIUM SUCCINATE 125 MG/2ML
125 INJECTION, POWDER, LYOPHILIZED, FOR SOLUTION INTRAMUSCULAR; INTRAVENOUS
Status: CANCELLED
Start: 2023-02-22

## 2023-01-25 RX ORDER — MEPERIDINE HYDROCHLORIDE 25 MG/ML
25 INJECTION INTRAMUSCULAR; INTRAVENOUS; SUBCUTANEOUS EVERY 30 MIN PRN
Status: CANCELLED | OUTPATIENT
Start: 2023-02-22

## 2023-01-25 RX ADMIN — LIDOCAINE HYDROCHLORIDE 2 ML: 10 INJECTION, SOLUTION EPIDURAL; INFILTRATION; INTRACAUDAL; PERINEURAL at 10:22

## 2023-01-25 RX ADMIN — BUPRENORPHINE 300 MG: 300 SOLUTION SUBCUTANEOUS at 10:25

## 2023-01-25 ASSESSMENT — PAIN SCALES - GENERAL: PAINLEVEL: NO PAIN (0)

## 2023-01-25 NOTE — PROGRESS NOTES
Infusion Nursing Note:  Ted Borrero presents today for first dose of sublocade 300mg.    Patient seen by provider today: No   present during visit today: Not Applicable.    Note: Patient states he has been on subloxone more than 7 days.  He denies relapse.  He is currently in inpatient treatment through [facility], and states this is going well.  Reviewed sublocade with patient and wallet card was given to him.    Intravenous Access:  No Intravenous access/labs at this visit.    Treatment Conditions:  Not Applicable.    Post Infusion Assessment:  Patient tolerated injection without incident.  Lidocaine given prior to sublocade.  Sublocade injection given in the same trajectory without difficulty. RLQ    Discharge Plan:   Patient discharged in stable condition accompanied by: self.  Departure Mode: Ambulatory.  Will return to clinic in 4 weeks, appointment scheduled.    Estelle Castle RN

## 2023-01-31 PROBLEM — R41.0 DELIRIUM: Status: RESOLVED | Noted: 2020-11-13 | Resolved: 2023-01-31

## 2023-01-31 PROBLEM — R40.4 TRANSIENT ALTERATION OF AWARENESS: Status: RESOLVED | Noted: 2021-04-07 | Resolved: 2023-01-31
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
## 2023-02-07 ENCOUNTER — TELEPHONE (OUTPATIENT)
Dept: BEHAVIORAL HEALTH | Facility: CLINIC | Age: 38
End: 2023-02-07
Payer: MEDICAID

## 2023-02-23 ENCOUNTER — OFFICE VISIT (OUTPATIENT)
Dept: FAMILY MEDICINE | Facility: CLINIC | Age: 38
End: 2023-02-23
Payer: MEDICAID

## 2023-02-23 VITALS
HEART RATE: 74 BPM | BODY MASS INDEX: 42.95 KG/M2 | SYSTOLIC BLOOD PRESSURE: 108 MMHG | WEIGHT: 290 LBS | RESPIRATION RATE: 20 BRPM | DIASTOLIC BLOOD PRESSURE: 72 MMHG | HEIGHT: 69 IN | OXYGEN SATURATION: 96 %

## 2023-02-23 DIAGNOSIS — D23.9 DERMATOFIBROMA: Primary | ICD-10-CM

## 2023-02-23 DIAGNOSIS — F11.20 OPIOID USE DISORDER, SEVERE, DEPENDENCE (H): ICD-10-CM

## 2023-02-23 DIAGNOSIS — D72.829 LEUKOCYTOSIS, UNSPECIFIED TYPE: ICD-10-CM

## 2023-02-23 DIAGNOSIS — L98.9 SKIN LESION: ICD-10-CM

## 2023-02-23 PROBLEM — F15.20 SEVERE METHAMPHETAMINE USE DISORDER (H): Status: ACTIVE | Noted: 2022-11-09

## 2023-02-23 LAB
BASOPHILS # BLD AUTO: 0.1 10E3/UL (ref 0–0.2)
BASOPHILS NFR BLD AUTO: 1 %
EOSINOPHIL # BLD AUTO: 0.2 10E3/UL (ref 0–0.7)
EOSINOPHIL NFR BLD AUTO: 2 %
ERYTHROCYTE [DISTWIDTH] IN BLOOD BY AUTOMATED COUNT: 12.3 % (ref 10–15)
HCT VFR BLD AUTO: 40.4 % (ref 40–53)
HGB BLD-MCNC: 13.1 G/DL (ref 13.3–17.7)
IMM GRANULOCYTES # BLD: 0 10E3/UL
IMM GRANULOCYTES NFR BLD: 0 %
LYMPHOCYTES # BLD AUTO: 2.7 10E3/UL (ref 0.8–5.3)
LYMPHOCYTES NFR BLD AUTO: 34 %
MCH RBC QN AUTO: 28.7 PG (ref 26.5–33)
MCHC RBC AUTO-ENTMCNC: 32.4 G/DL (ref 31.5–36.5)
MCV RBC AUTO: 88 FL (ref 78–100)
MONOCYTES # BLD AUTO: 0.7 10E3/UL (ref 0–1.3)
MONOCYTES NFR BLD AUTO: 9 %
NEUTROPHILS # BLD AUTO: 4.3 10E3/UL (ref 1.6–8.3)
NEUTROPHILS NFR BLD AUTO: 54 %
PLATELET # BLD AUTO: 295 10E3/UL (ref 150–450)
RBC # BLD AUTO: 4.57 10E6/UL (ref 4.4–5.9)
WBC # BLD AUTO: 7.8 10E3/UL (ref 4–11)

## 2023-02-23 PROCEDURE — 85025 COMPLETE CBC W/AUTO DIFF WBC: CPT | Mod: QW | Performed by: FAMILY MEDICINE

## 2023-02-23 PROCEDURE — 88305 TISSUE EXAM BY PATHOLOGIST: CPT | Performed by: PATHOLOGY

## 2023-02-23 PROCEDURE — 11104 PUNCH BX SKIN SINGLE LESION: CPT | Performed by: FAMILY MEDICINE

## 2023-02-23 PROCEDURE — 36415 COLL VENOUS BLD VENIPUNCTURE: CPT | Performed by: FAMILY MEDICINE

## 2023-02-23 PROCEDURE — 99213 OFFICE O/P EST LOW 20 MIN: CPT | Mod: 25 | Performed by: FAMILY MEDICINE

## 2023-02-23 PROCEDURE — 99395 PREV VISIT EST AGE 18-39: CPT | Performed by: FAMILY MEDICINE

## 2023-02-23 RX ORDER — HYDROXYZINE PAMOATE 25 MG/1
25-50 CAPSULE ORAL 3 TIMES DAILY PRN
Qty: 90 CAPSULE | Refills: 1 | Status: SHIPPED | OUTPATIENT
Start: 2023-02-23 | End: 2023-04-26

## 2023-02-23 RX ORDER — CLONIDINE HYDROCHLORIDE 0.1 MG/1
0.1 TABLET ORAL AT BEDTIME
Qty: 30 TABLET | Refills: 3 | Status: SHIPPED | OUTPATIENT
Start: 2023-02-23

## 2023-02-23 RX ORDER — SENNOSIDES A AND B 8.6 MG/1
8.6-17.2 TABLET, FILM COATED ORAL DAILY PRN
COMMUNITY
Start: 2022-12-04 | End: 2023-03-14

## 2023-02-23 ASSESSMENT — ENCOUNTER SYMPTOMS
ABDOMINAL PAIN: 1
MYALGIAS: 1
PALPITATIONS: 0
DYSURIA: 0
SHORTNESS OF BREATH: 0
PARESTHESIAS: 0
NAUSEA: 1
DIZZINESS: 0
ARTHRALGIAS: 1
EYE PAIN: 0
SORE THROAT: 0
HEARTBURN: 0
FEVER: 0
FREQUENCY: 0
HEMATOCHEZIA: 0
HEADACHES: 1
COUGH: 0
JOINT SWELLING: 0
DIARRHEA: 0
NERVOUS/ANXIOUS: 1
WEAKNESS: 1
HEMATURIA: 0
CHILLS: 0
CONSTIPATION: 1

## 2023-02-23 ASSESSMENT — LIFESTYLE VARIABLES
HOW OFTEN DO YOU HAVE SIX OR MORE DRINKS ON ONE OCCASION: NEVER
HOW MANY STANDARD DRINKS CONTAINING ALCOHOL DO YOU HAVE ON A TYPICAL DAY: PATIENT DOES NOT DRINK

## 2023-02-23 NOTE — PROGRESS NOTES
SUBJECTIVE:   CC: Ted is an 37 year old who presents for preventative health visit.     Patient has been advised of split billing requirements and indicates understanding: Yes     Healthy Habits:     Getting at least 3 servings of Calcium per day:  Yes    Bi-annual eye exam:  NO    Dental care twice a year:  NO    Sleep apnea or symptoms of sleep apnea:  Daytime drowsiness    Diet:  Regular (no restrictions)    Frequency of exercise:  2-3 days/week    Duration of exercise:  15-30 minutes    Taking medications regularly:  Yes    Medication side effects:  Other    PHQ-2 Total Score: 2    Additional concerns today:  Yes     Patient is here to establish care and to obtain refills of some of his medications.  He has history of methamphetamine use, opioid use disorder and is currently at [facility] for a 3-month residential treatment program.  He was recently switched from Suboxone to Sublocade and is doing well with this.    Patient also has some skin issues on his lower legs that he wants to have checked that are concerning to him.  He denies history of skin cancer.  Family history of melanoma    He reports history of leukocytosis and would like to have this rechecked today.  Today's PHQ-2 Score:   PHQ-2 ( 1999 [employer]) 2/23/2023   Q1: Little interest or pleasure in doing things 1   Q2: Feeling down, depressed or hopeless 1   PHQ-2 Score 2   Q1: Little interest or pleasure in doing things Several days   Q2: Feeling down, depressed or hopeless Several days   PHQ-2 Score 2       Have you ever done Advance Care Planning? (For example, a Health Directive, POLST, or a discussion with a medical provider or your loved ones about your wishes): No, advance care planning information given to patient to review.  Patient declined advance care planning discussion at this time.    Social History     Tobacco Use     Smoking status: Former     Packs/day: 0.25     Types: Cigarettes     Smokeless tobacco: Never   Substance Use Topics  "    Alcohol use: Not Currently     Comment: occasional     If you drink alcohol do you typically have >3 drinks per day or >7 drinks per week? No    Alcohol Use 2/23/2023   Prescreen: >3 drinks/day or >7 drinks/week? Not Applicable   No flowsheet data found.    Reviewed and updated as needed this visit by clinical staff   Tobacco  Allergies               Reviewed and updated as needed this visit by Provider                     Review of Systems   Constitutional: Negative for chills and fever.   HENT: Negative for congestion, ear pain, hearing loss and sore throat.    Eyes: Negative for pain and visual disturbance.   Respiratory: Negative for cough and shortness of breath.    Cardiovascular: Positive for chest pain and peripheral edema. Negative for palpitations.   Gastrointestinal: Positive for abdominal pain, constipation and nausea. Negative for diarrhea, heartburn and hematochezia.   Genitourinary: Negative for dysuria, frequency, genital sores, hematuria and urgency.   Musculoskeletal: Positive for arthralgias and myalgias. Negative for joint swelling.   Skin: Negative for rash.   Neurological: Positive for weakness and headaches. Negative for dizziness and paresthesias.   Psychiatric/Behavioral: Positive for mood changes. The patient is nervous/anxious.          OBJECTIVE:   /72 (BP Location: Right arm, Patient Position: Sitting, Cuff Size: Adult Large)   Pulse 74   Resp 20   Ht 1.753 m (5' 9\")   Wt 131.5 kg (290 lb)   SpO2 96%   BMI 42.83 kg/m      Physical Exam  GENERAL: healthy, alert and no distress  NECK: no adenopathy, no asymmetry, masses, or scars and thyroid normal to palpation  RESP: lungs clear to auscultation - no rales, rhonchi or wheezes  CV: regular rate and rhythm, normal S1 S2, no S3 or S4, no murmur, click or rub, no peripheral edema and peripheral pulses strong  MS: no gross musculoskeletal defects noted, no edema  SKIN: no suspicious lesions or rashes and multiple firm, " "hyperpigmented nodular lesions on the legs.                      ASSESSMENT/PLAN:   (D23.9) Dermatofibroma  (primary encounter diagnosis)  Comment: Lesions appear to be dermatofibroma's.  Plan: Large lesion on the posterior left leg was prepped with alcohol and a 3 mm punch biopsy was taken from the center of the lesion.  Hemostasis with silver nitrate.  Specimen is sent to pathology.    (D76.377) Leukocytosis, unspecified type  Comment: History of leukocytosis  Plan: CBC with Platelets & Differential        Repeat CBC with differential today    (L98.9) Skin lesion  Comment: See above    (F11.20) Opioid use disorder, severe, dependence (H)  Comment: Under control, he will continue with his current medication regimen.  Plan: hydrOXYzine (VISTARIL) 25 MG capsule, cloNIDine        (CATAPRES) 0.1 MG tablet              BMI:   Estimated body mass index is 42.83 kg/m  as calculated from the following:    Height as of this encounter: 1.753 m (5' 9\").    Weight as of this encounter: 131.5 kg (290 lb).   Weight management plan: Discussed healthy diet and exercise guidelines      He reports that he has quit smoking. His smoking use included cigarettes. He smoked an average of .25 packs per day. He has never used smokeless tobacco.  Nicotine/Tobacco Cessation Plan:   Information offered: Patient not interested at this time            Seven Resendiz MD  [facility]  "

## 2023-02-27 LAB
PATH REPORT.COMMENTS IMP SPEC: NORMAL
PATH REPORT.COMMENTS IMP SPEC: NORMAL
PATH REPORT.FINAL DX SPEC: NORMAL
PATH REPORT.GROSS SPEC: NORMAL
PATH REPORT.MICROSCOPIC SPEC OTHER STN: NORMAL
PATH REPORT.RELEVANT HX SPEC: NORMAL
PHOTO IMAGE: NORMAL

## 2023-02-28 ENCOUNTER — OFFICE VISIT (OUTPATIENT)
Dept: BEHAVIORAL HEALTH | Facility: CLINIC | Age: 38
End: 2023-02-28
Payer: MEDICAID

## 2023-02-28 VITALS
DIASTOLIC BLOOD PRESSURE: 80 MMHG | OXYGEN SATURATION: 98 % | HEART RATE: 74 BPM | TEMPERATURE: 98 F | SYSTOLIC BLOOD PRESSURE: 113 MMHG

## 2023-02-28 DIAGNOSIS — Z72.0 TOBACCO USE: ICD-10-CM

## 2023-02-28 DIAGNOSIS — F15.20 METHAMPHETAMINE USE DISORDER, SEVERE (H): ICD-10-CM

## 2023-02-28 DIAGNOSIS — F11.20 OPIOID USE DISORDER, SEVERE, DEPENDENCE (H): Primary | ICD-10-CM

## 2023-02-28 PROCEDURE — 99214 OFFICE O/P EST MOD 30 MIN: CPT | Performed by: NURSE PRACTITIONER

## 2023-02-28 PROCEDURE — H0038 SELF-HELP/PEER SVC PER 15MIN: HCPCS | Mod: U5

## 2023-02-28 RX ORDER — NICOTINE 21 MG/24HR
1 PATCH, TRANSDERMAL 24 HOURS TRANSDERMAL EVERY 24 HOURS
Qty: 30 PATCH | Refills: 1 | Status: SHIPPED | OUTPATIENT
Start: 2023-02-28 | End: 2023-04-26 | Stop reason: DRUGHIGH

## 2023-02-28 RX ORDER — BUPRENORPHINE AND NALOXONE 8; 2 MG/1; MG/1
.5-1 FILM, SOLUBLE BUCCAL; SUBLINGUAL DAILY PRN
Qty: 30 FILM | Refills: 0
Start: 2023-02-28 | End: 2023-04-26

## 2023-02-28 ASSESSMENT — PATIENT HEALTH QUESTIONNAIRE - PHQ9: SUM OF ALL RESPONSES TO PHQ QUESTIONS 1-9: 7

## 2023-02-28 NOTE — PROGRESS NOTES
[facility]    Peer  met with Ted Borrero in the Recovery Clinic to introduce himself, detail services provided and discuss current status of recovery. Pt appeared alert, oriented and open to feedback during our discussion.     Pt arrives for visit with provider in conjunctim with receipt of sublocade injection at Infusion Center.  Pt reports recently making a swiich from suboxone to sublocade and reports no significant issues in the change.  Pt reports  DOC were fetanyl, meth and perc 30s.  Pt states recovery has been going well with last date of use - 10/30/22.  Pt reprots previous treatment at [facility] in 2022.    Pt reports currently at [facility] treatment facility.    Pt states currently attending programming as well as incorporating online college classes and a part time job.  PRC and pt discussed how effective management of  these responsibilities / obligations are progressing.  Pt feels like there is a healthy balance of the three at the present time.  PRC encouraged pt to reach out to treatment staff, class instructors and his employer should one become more overloaded than the other. PRC encouraged pt to keep recovery aspect of his life at the top priority in daily living.     PRC provided business card to pt welcoming contact for recovery based support and resources. PRC and pt agree to speak again during an upcoming  visit.       Service Type:     Individual     Session Start Time:           10:45 am              Session End Time:             11:00 am    Session Length:         15 minutes    Patient Goal:   To utilize suboxone assisted treatment for sobriety and long term recovery.   Pt is currently at [facility] with goal of completing the programming as scheduled.     Goal Progress:   Ongoing.    Key Risk Factors to Recovery:   PRC encourages being aware of risk factors that can lead to re-use which include avoiding isolation, avoiding  triggers and managing cravings in a healthy manner. being open and willing to acceptance and change on a daily basis.  PRC encourages pt to establish a sober network calling tree to reach out to when needed.  Continue to practice honesty with ourselves and trusted support person(s).   PRC encourages regular attendance at recovery based meetings as well as finding a sponsor for mentoring and accountability.   PRC encourages consideration of other services such as counseling for mental health issues which can correlate with our substance use.      Support Needs:   Ongoing care, support and resources for opioid substance use disorder.     Follow up:   [facility] has provided pt with his contact information for support and resource needs.    PRC and pt agree to meet during an upcoming  visit.       [facility]  [address], [address]   [place], [place], [address]  Clinic Phone: [phone]  Clinic Fax: [phone]  Peer  phone: [phone]    Open Monday - Friday  9:00am-4:00pm  Walk in hours: 9am-3pm      Miguel Luna  February 28, 2023  11:30 AM    Charlie CUNHA provides clinical oversite and supervision of care.

## 2023-02-28 NOTE — NURSING NOTE
[facility]      Rooming information:  Approximate last use of full opioid agonist: 10/30/2022  Taking buprenorphine? Yes:  As prescribed? Yes: Sublocade 300 mg  Number of buprenorphine films/tablets remaining currently: 29  Side effects related to buprenorphine (constipation, dry mouth, sedation?) No   Narcan currently available: Yes  Other recent substance use:    Denies  NICOTINE-Yes: Cigarettes  If using nicotine, ready to quit? Yes:     Point of care urine drug screen positive for:  Urine Drug Screen Results  AMP: Negative  BAR: Negative  BUP: Positive  BZO: Negative  JONATHON: Negative  mAMP: Negative  MDMA : Negative  MTD: Negative  INO214: Negative  OXY: Negative  PCP : Negative  THC : Negative  *POC urine drug screen does not screen for Fentanyl      PHQ Assesment Total Score(s) 2/28/2023   PHQ-9 Score 7   Some recent data might be hidden       If PHQ-9 score of 15 or higher, has Recovery Clinic therapist or provider been notified? N/A    Any current suicidal ideation? No  If yes, has Recovery Clinic therapist or provider been notified? N/A    Primary care provider: Denies Physician No Ref-Primary     Mental health provider: Denies  (follow up on MH referral if needed)    Insurance needs: Active    Housing needs: Stable    Contact information up to date? Yes    3rd Party Involvement None today     Italia Fernandez RN  February 28, 2023  9:44 AM

## 2023-02-28 NOTE — PROGRESS NOTES
"[facility] Follow Up    ASSESSMENT/PLAN                                                      1. Opioid use disorder, severe, dependence (H)  Controlled. Continue monthly Sublocade injections. Next injection, #2 300 mg on 3/2/23. Previously stable of 24 mg of SL buprenorphine per day. OK to take 4-8 mg of SL buprenorphine as needed for cravings. Updated below rx. No refill needed today, has films at home.   - Continue engagement with programming at [facility].   - recheck AST and ALT ~ 03/2023   - confirms access to narcan   - buprenorphine HCl-naloxone HCl (SUBOXONE) 8-2 MG per film; Place 0.5-1 Film under the tongue daily as needed (opioid cravings or withdrawal symptoms)  Dispense: 30 Film; Refill: 0    2. Methamphetamine use disorder, severe (H)  - Controlled. No recent use or cravings. Continue engagement with programming at [facility].     3. Tobacco use  - Encouraged efforts with smoking cessation. Start NRT as below.   - nicotine (NICODERM CQ) 21 MG/24HR 24 hr patch; Place 1 patch onto the skin every 24 hours  Dispense: 30 patch; Refill: 1  - nicotine (NICORETTE) 4 MG lozenge; Place 1 lozenge (4 mg) inside cheek every hour as needed for smoking cessation  Dispense: 108 lozenge; Refill: 1    Return in about 4 weeks (around 3/28/2023) for Follow up, with me, in person in [facility].    SUBJECTIVE                                                        CC/HPI:  Ted Borrero is a 37 year old male with PMH of hypercholesteremia, depression, schizoaffective disorder, PTSD, methamphetamine use disorder and opioid use disorder on buprenorphine who presents to the Recovery Clinic for return visit.       Brief History:  Ted Borrero was first seen in Recovery Clinic on 12/28/22. They were referred by [facility].   Patient's reasons for seeking treatment on this date include \"I want to switch from taking Suboxone to the Sublocade injection.\" Reports " opioid use starting at 17 yo, occasionally, pills or heroin. Reports daily use starting within the past 1.5 yrs, perc 30 pills. Reports he was using methamphetamine daily. Denies benzodiazapine use history.  Reports history of binge drinking about 5 years ago. Reports he was started on Suboxone in early November at [facility] following an overdose. Has taken it daily since then. Taking 8 mg SL TID. Continued buprenorphine through  after initial visit. Transitioned to Sublocade. Initial injection on 1/25/23.      Substance Use History :  Opioids:   Age at first use: 18, started with occasional use of rx opioid pills, progressed to heroin use.  ~2020 began using unregulated opioid pills/fentanyl   Current use: substance: Fentanyl; quantity daily; route: smoke;timing of last use: 10/30/22;                 IV drug use: Yes: once on 10/30/22   History of overdose: Yes: twice last time on 10/30/22  Previous residential or outpatient treatments for addiction : Yes: 4 times  Previous medication treatments for addiction: Yes: Suboxone  Longest period of sobriety: currently   Medical complications related to substance use: denies  Hepatitis C: 12/28/22 HCV ab nonreactive  HIV: 12/28/22 HIV ag/ab nonreactive     Other Addiction History:  Stimulants   Meth-daily use until 10/30/22; Cocaine- occasional   Sedatives/hypnotics/anxiolytics:   denies  Alcohol:   denies  Nicotine:   0.25 pack/day  Cannabis:   Occasional   Hallucinogens/Dissociatives:   occasional  Eating disorder:  denies  Gambling:              denies          PAST PSYCHIATRIC HISTORY:  Diagnoses- PTSD and schizoaffective   Suicide Attempts: Yes 2.5 years ago, one time   Hospitalizations: No   Mental health provider: Chris Tony; transferring to a different provider within [facility] 2/2023    Social History  Housing status: in a sober house, [facility]  Employment status: Unemployed, not seeking work  Relationship status:  Partnered  Children: 1 child 5 YO son, lives with mom/pt's SO of 12 years  Legal: probation          Most recent Recovery Clinic visit 1/12/23    A/P last visit:  1. Opioid use disorder, severe, dependence (H)  Controlled.  Pt remains interested in transfer to Sublocade.  Awaiting PA response.   Continue SL buprenorphine 24mg/day for now  Start Sublocade when approved  Discontinue scheduled buprenorphine after Sublocade #1.   Recommend he be seen in  in conjunction with Sublocade injection for evaluation/treatment of symptoms  Continue programming through [facility]  - buprenorphine HCl-naloxone HCl (SUBOXONE) 8-2 MG per film; Place 1 Film under the tongue 3 times daily  Dispense: 90 Film; Refill: 0     2. Methamphetamine use disorder, severe (H)  Continue programming through [facility]     3. Tobacco use  Pt is chewing tobacco, smoking, and using ENDS.  Not ready to quit at this time.       4. Healthcare maintenance  Pt requested continued MVI and to start vitamin d. Encouraged well balanced diet.   - Multiple Vitamin (QUINTABS) TABS; Take 1 tablet by mouth daily  Dispense: 90 tablet; Refill: 3  - vitamin D3 (CHOLECALCIFEROL) 50 mcg (2000 units) tablet; Take 1 tablet (50 mcg) by mouth daily  Dispense: 90 tablet; Refill: 3      Interval Hx:   - 1/25/23 - Sublocade #1 300 mg   - 2/22/23: Per Finance team, patients insurance ([place] Medicaid) only approves the initial PA for 3 months. The 2 doses of 300 mg and first dose of 100 mg covered in the first PA but still need to do a new UDS for the PA renewal after 3 months.    02/28/23 visit:  - Here today for follow up, had initial injection and has intermittently taken 8 mg of SL buprenorphine. Took 8 mg for the first couple of days after inject. Then symptoms resolved. Was schedule for injection a couple days ago but needed to reschedule due to weather. Next injection now on 3/2/23. Taking 8 mg of SL buprenorphine or opioid cravings as needed. Was previously on 24  mg of SL buprenorphine per day. Initial injection went well, no injection site reactions or adverse SE.   - Methamphetamine cravings well controlled.   - has continued engagement with [facility] inpatient, he will be there until April 4th 2023. Working on discharge plan. Possible sober housing with outpatient or back home.   - Currently smoking cigarettes and vaping about 5 cigarettes per day. Motivated to quit. Trying to use ENDS to reduce cigarettes. Has used lozenges and patch in the patch which was effective.         [place] Prescription Drug Monitoring Program Reviewed:  Yes  01/19/2023  2   01/12/2023  Suboxone 8 Mg-2 MG SL Film  81.00  27       Medications:  cloNIDine (CATAPRES) 0.1 MG tablet, Take 1 tablet (0.1 mg) by mouth At Bedtime  hydrOXYzine (VISTARIL) 25 MG capsule, Take 1-2 capsules (25-50 mg) by mouth 3 times daily as needed for itching  lithium (ESKALITH CR/LITHOBID) 450 MG CR tablet,   Multiple Vitamin (QUINTABS) TABS, Take 1 tablet by mouth daily  polyethylene glycol (MIRALAX) 17 GM/Dose powder, Take 1 capful by mouth daily  risperiDONE (RISPERDAL) 2 MG tablet, Take 1 tablet (2 mg) by mouth 2 times daily  senna (SENOKOT) 8.6 MG tablet, Take 8.6-17.2 mg by mouth daily as needed for constipation  vitamin D3 (CHOLECALCIFEROL) 50 mcg (2000 units) tablet, Take 1 tablet (50 mcg) by mouth daily    No current facility-administered medications on file prior to visit.      No Known Allergies    PMH, PSH, FamHx reviewed      OBJECTIVE                                                      /80 (BP Location: Right arm, Patient Position: Sitting)   Pulse 74   Temp 98  F (36.7  C) (Oral)   SpO2 98%   PHQ 12/28/2022 1/12/2023 2/28/2023   PHQ-9 Total Score 11 10 7   Q9: Thoughts of better off dead/self-harm past 2 weeks Not at all Not at all Not at all         Physical Exam  Vitals and nursing note reviewed.   Constitutional:       General: He is not in acute distress.     Appearance: Normal  appearance.   Pulmonary:      Effort: Pulmonary effort is normal.   Neurological:      Mental Status: He is alert and oriented to person, place, and time.   Psychiatric:         Mood and Affect: Mood normal.         Behavior: Behavior normal.         Thought Content: Thought content normal.         Judgment: Judgment normal.         Labs:    UDS:  02/28/23  Urine Drug Screen Results  AMP: Negative  BAR: Negative  BUP: Positive  BZO: Negative  JONATHON: Negative  mAMP: Negative  MDMA : Negative  MTD: Negative  FCS620: Negative  OXY: Negative  PCP : Negative  THC : Negative  *POC urine drug screen does not screen for Fentanyl      Recent Results (from the past 240 hour(s))   CBC with platelets and differential    Collection Time: 02/23/23  9:57 AM   Result Value Ref Range    WBC Count 7.8 4.0 - 11.0 10e3/uL    RBC Count 4.57 4.40 - 5.90 10e6/uL    Hemoglobin 13.1 (L) 13.3 - 17.7 g/dL    Hematocrit 40.4 40.0 - 53.0 %    MCV 88 78 - 100 fL    MCH 28.7 26.5 - 33.0 pg    MCHC 32.4 31.5 - 36.5 g/dL    RDW 12.3 10.0 - 15.0 %    Platelet Count 295 150 - 450 10e3/uL    % Neutrophils 54 %    % Lymphocytes 34 %    % Monocytes 9 %    % Eosinophils 2 %    % Basophils 1 %    % Immature Granulocytes 0 %    Absolute Neutrophils 4.3 1.6 - 8.3 10e3/uL    Absolute Lymphocytes 2.7 0.8 - 5.3 10e3/uL    Absolute Monocytes 0.7 0.0 - 1.3 10e3/uL    Absolute Eosinophils 0.2 0.0 - 0.7 10e3/uL    Absolute Basophils 0.1 0.0 - 0.2 10e3/uL    Absolute Immature Granulocytes 0.0 <=0.4 10e3/uL   Surgical Pathology Exam    Collection Time: 02/23/23 10:42 AM   Result Value Ref Range    Case Report       Surgical Pathology Report                         Case: [id number]                                  Authorizing Provider:  Seven Resendiz MD     Collected:           02/23/2023 10:42 AM          Ordering Location:     [facility]  Received:            02/23/2023 10:59 AM                                 - Ethel                         "                                        Pathologist:           Ina Dempsey MD                                                                           Specimen:    Leg, Below Knee, Left                                                                      Final Diagnosis       Skin, left leg below the knee, punch biopsy:  -Dermatofibroma, lesion involves lateral and deep sample margins        Clinical Information       skin lesion      Gross Description       A(A). Leg, Below Knee, Left, :  The specimen is received in formalin, labeled with the patient's name, medical record number and other identifying information designated \"left below-knee\". It consists of a 0.4 cm skin punch biopsy.  Inked black, bisected and entirely submitted in 1 cassette.   (LAIN Riggs)      Microscopic Description       A formal microscopic examination has been performed      Performing Labs       The technical component of this testing was completed at [facility]      Case Images           Patient counseling completed today:  Discussed mechanism of action, potential risks/benefits/side effects of medications and other recommendations above.  Recommend pt keep naloxone in their possession and reviewed other aspects of harm reduction to reduce risk of overdose with return to use.   Recommended avoiding concurrent use of alcohol, benzodiazepines or other sedatives with buprenorphine or other opioids.      EDITH Gallardo [facility]  [facility] [place] [facility] [place] [address]  [phone]    "

## 2023-03-02 ENCOUNTER — INFUSION THERAPY VISIT (OUTPATIENT)
Dept: INFUSION THERAPY | Facility: CLINIC | Age: 38
End: 2023-03-02
Attending: FAMILY MEDICINE
Payer: MEDICAID

## 2023-03-02 VITALS — HEART RATE: 98 BPM | SYSTOLIC BLOOD PRESSURE: 120 MMHG | DIASTOLIC BLOOD PRESSURE: 80 MMHG

## 2023-03-02 DIAGNOSIS — F11.20 OPIOID USE DISORDER, SEVERE, DEPENDENCE (H): Primary | ICD-10-CM

## 2023-03-02 PROCEDURE — 250N000009 HC RX 250: Performed by: NURSE PRACTITIONER

## 2023-03-02 PROCEDURE — 96372 THER/PROPH/DIAG INJ SC/IM: CPT | Performed by: NURSE PRACTITIONER

## 2023-03-02 PROCEDURE — 250N000011 HC RX IP 250 OP 636: Performed by: NURSE PRACTITIONER

## 2023-03-02 RX ORDER — METHYLPREDNISOLONE SODIUM SUCCINATE 125 MG/2ML
125 INJECTION, POWDER, LYOPHILIZED, FOR SOLUTION INTRAMUSCULAR; INTRAVENOUS
Status: CANCELLED
Start: 2023-03-21

## 2023-03-02 RX ORDER — MEPERIDINE HYDROCHLORIDE 25 MG/ML
25 INJECTION INTRAMUSCULAR; INTRAVENOUS; SUBCUTANEOUS EVERY 30 MIN PRN
Status: CANCELLED | OUTPATIENT
Start: 2023-03-21

## 2023-03-02 RX ORDER — LIDOCAINE HYDROCHLORIDE 10 MG/ML
2 INJECTION, SOLUTION EPIDURAL; INFILTRATION; INTRACAUDAL; PERINEURAL ONCE
Status: CANCELLED | OUTPATIENT
Start: 2023-03-21 | End: 2023-03-21

## 2023-03-02 RX ORDER — ALBUTEROL SULFATE 90 UG/1
1-2 AEROSOL, METERED RESPIRATORY (INHALATION)
Status: CANCELLED
Start: 2023-03-21

## 2023-03-02 RX ORDER — EPINEPHRINE 1 MG/ML
0.3 INJECTION, SOLUTION, CONCENTRATE INTRAVENOUS EVERY 5 MIN PRN
Status: CANCELLED | OUTPATIENT
Start: 2023-03-21

## 2023-03-02 RX ORDER — ALBUTEROL SULFATE 0.83 MG/ML
2.5 SOLUTION RESPIRATORY (INHALATION)
Status: CANCELLED | OUTPATIENT
Start: 2023-03-21

## 2023-03-02 RX ORDER — LIDOCAINE HYDROCHLORIDE 10 MG/ML
2 INJECTION, SOLUTION EPIDURAL; INFILTRATION; INTRACAUDAL; PERINEURAL ONCE
Status: COMPLETED | OUTPATIENT
Start: 2023-03-02 | End: 2023-03-02

## 2023-03-02 RX ORDER — DIPHENHYDRAMINE HYDROCHLORIDE 50 MG/ML
50 INJECTION INTRAMUSCULAR; INTRAVENOUS
Status: CANCELLED
Start: 2023-03-21

## 2023-03-02 RX ADMIN — LIDOCAINE HYDROCHLORIDE 2 ML: 10 INJECTION, SOLUTION EPIDURAL; INFILTRATION; INTRACAUDAL; PERINEURAL at 13:10

## 2023-03-02 RX ADMIN — BUPRENORPHINE 300 MG: 300 SOLUTION SUBCUTANEOUS at 13:12

## 2023-03-02 ASSESSMENT — PAIN SCALES - GENERAL: PAINLEVEL: NO PAIN (0)

## 2023-03-02 NOTE — PROGRESS NOTES
Infusion Nursing Note:  Ted Borrero presents today for Sublocade.    Patient seen by provider today: No   present during visit today: Not Applicable.    Note: Lidocaine then Sublocade injected into abd.    Intravenous Access:  No Intravenous access/labs at this visit.    Treatment Conditions:  Denies opioid use.    Post Infusion Assessment:  Patient tolerated injection without incident.     Discharge Plan:   Patient and/or family verbalized understanding of discharge instructions and all questions answered.  Patient discharged in stable condition accompanied by: friend.      MAURICIO CULVER RN

## 2023-03-14 ENCOUNTER — VIRTUAL VISIT (OUTPATIENT)
Dept: FAMILY MEDICINE | Facility: CLINIC | Age: 38
End: 2023-03-14
Payer: MEDICAID

## 2023-03-14 DIAGNOSIS — K52.9 COLITIS: ICD-10-CM

## 2023-03-14 DIAGNOSIS — F11.20 OPIOID USE DISORDER, SEVERE, DEPENDENCE (H): ICD-10-CM

## 2023-03-14 DIAGNOSIS — F25.0 SCHIZOAFFECTIVE DISORDER, BIPOLAR TYPE (H): Primary | ICD-10-CM

## 2023-03-14 PROCEDURE — 99213 OFFICE O/P EST LOW 20 MIN: CPT | Mod: VID | Performed by: FAMILY MEDICINE

## 2023-03-14 RX ORDER — LITHIUM CARBONATE 450 MG
450 TABLET, EXTENDED RELEASE ORAL AT BEDTIME
Qty: 90 TABLET | Refills: 0 | Status: SHIPPED | OUTPATIENT
Start: 2023-03-14

## 2023-03-14 RX ORDER — SENNOSIDES A AND B 8.6 MG/1
8.6-17.2 TABLET, FILM COATED ORAL DAILY PRN
Qty: 30 TABLET | Refills: 3 | Status: SHIPPED | OUTPATIENT
Start: 2023-03-14

## 2023-03-14 RX ORDER — RISPERIDONE 1 MG/1
1 TABLET ORAL 2 TIMES DAILY
Qty: 60 TABLET | Refills: 1 | Status: SHIPPED | OUTPATIENT
Start: 2023-03-14

## 2023-03-14 NOTE — PROGRESS NOTES
Ted is a 37 year old who is being evaluated via a billable video visit.      How would you like to obtain your AVS? MyChart  If the video visit is dropped, the invitation should be resent by: Text to cell phone: [phone]  Will anyone else be joining your video visit? No        Assessment & Plan     Schizoaffective disorder, bipolar type (H)  Patient with history of schizoaffective and bipolar disorder.  Would like a referral to psychiatry to manage his meds which we did today.  He is very frustrated with the Risperdal causing weight gain and make him feel tired and fatigued we will try cautiously to do a reduced dose.  He is up-to-date on his labs  - lithium (ESKALITH CR/LITHOBID) 450 MG CR tablet; Take 1 tablet (450 mg) by mouth At Bedtime  - [facility] Referral; Future  - risperiDONE (RISPERDAL) 1 MG tablet; Take 1 tablet (1 mg) by mouth 2 times daily    Opioid use disorder, severe, dependence (H)  Managed by an opiate clinic    Colitis    - senna (SENOKOT) 8.6 MG tablet; Take 1-2 tablets (8.6-17.2 mg) by mouth daily as needed for constipation                 No follow-ups on file.    Barry Bowen MD  [facility]    Subjective   Ted is a 37 year old, presenting for the following health issues: Patient is currently at the [facility] in [place].  He has a history of methamphetamine use and opioid use.  He is in a 3-month residential treatment program currently.  He needs his lithium renewed and he is also frustrated with his Resporal would like to change the dose of that because of weight gain and fatigue  Medication Request (Discuss discontinuing risperidone. Refill senna and lithium. Has seen a psychiatrist in past but they have left. Would like referral to establish care with new one. )      History of Present Illness       Reason for visit:  Refills    He eats 0-1 servings of fruits and vegetables daily.He consumes 1 sweetened beverage(s)  daily.He exercises with enough effort to increase his heart rate 9 or less minutes per day.  He exercises with enough effort to increase his heart rate 3 or less days per week. He is missing 1 dose(s) of medications per week.  He is not taking prescribed medications regularly due to side effects.             Review of Systems   Constitutional, HEENT, cardiovascular, pulmonary, gi and gu systems are negative, except as otherwise noted.      Objective           Vitals:  No vitals were obtained today due to virtual visit.    Physical Exam   GENERAL: Healthy, alert and no distress  EYES: Eyes grossly normal to inspection.  No discharge or erythema, or obvious scleral/conjunctival abnormalities.  RESP: No audible wheeze, cough, or visible cyanosis.  No visible retractions or increased work of breathing.    SKIN: Visible skin clear. No significant rash, abnormal pigmentation or lesions.  NEURO: Cranial nerves grossly intact.  Mentation and speech appropriate for age.  PSYCH: Mentation appears normal, affect normal/bright, judgement and insight intact, normal speech and appearance well-groomed.                Video-Visit Details    Type of service:  Video Visit   Video Start Time: 420  Video End Time: 425    Originating Location (pt. Location): Other [place]  Distant Location (provider location):  On-site  Platform used for Video Visit: [employer]

## 2023-03-28 ENCOUNTER — TELEPHONE (OUTPATIENT)
Dept: BEHAVIORAL HEALTH | Facility: CLINIC | Age: 38
End: 2023-03-28
Payer: MEDICAID

## 2023-03-28 NOTE — TELEPHONE ENCOUNTER
Per Infusion Center :  [place] Medicaid has received my urgent pa request and has authorized sublocade for a one time dose and the prior authorization expires 03.31.23 as the patient will be changing insurance as of 04.01.23.     They are okay to proceed with their sublocade tomorrow if the clinic still has room and if patient is willing.      Phone call to patient. Patient is rescheduled for appt with Kelly Hall tomorrow in the [facility] clinic with Sublocade to follow.    Routing to Kelly Hall as HUGH.    Gini Mawxell RN on 3/28/2023 at 1:56 PM

## 2023-03-28 NOTE — TELEPHONE ENCOUNTER
Per Infusion Center :   Cortney Noe Claire, EDITH CNP; P Recovery Clinic Rn Pool  Cc: P [facility]rapies   Hi,     We have submitted a few prior authorization renewals for the patient which [place] Medicaid is being extremely picky over. They have stated they denied the original request because patient was dosed with sublocade on 03.02.23 and their approved dose usage is every 30 days.     We resubmitted and they wanted an attestation that the provider will follow all aspects of the rems program, we have resubmitted a third time with the original attestation but cannot guarantee they will approve the prior authorization request with their prior decision stating the 30 days timeframe of receiving sublocade.     As of right now we cannot guarantee coverage of the patients dose of sublocade tomorrow morning.     Phone call to patient. Informed him that Sublocade may not be covered tomorrow. Patient prefers to cancel appt with Infusion Center and Kelly tomorrow and reschedule both appts after Sublocade PA approved.     Patient denies opiate use. Endorses some mild cravings the past couple of days. Encouraged patient to take oral Suboxone as prescribed for cravings. Patient confirmed he has oral Suboxone. Informed patient that we will notify him when Sublocade PA approved.    Routing to Kelly Hall and EVELINA lo as HUGH.    Gini Maxwell RN on 3/28/2023 at 10:59 AM

## 2023-04-14 ENCOUNTER — TELEPHONE (OUTPATIENT)
Dept: BEHAVIORAL HEALTH | Facility: CLINIC | Age: 38
End: 2023-04-14
Payer: COMMERCIAL

## 2023-04-14 DIAGNOSIS — F11.20 OPIOID USE DISORDER, SEVERE, DEPENDENCE (H): Primary | ICD-10-CM

## 2023-04-14 NOTE — TELEPHONE ENCOUNTER
----- Message from Cortney Noe sent at 4/14/2023  1:42 PM CDT -----  Regarding: Need UDS within last 30 days for new insurance may need to postpone monday appt  Hi,    Patient has new insurance [employer] and we dont have a UDS test to submit for a prior authorization request for the patient. We would need a UDS test within the last 30 days or would need to postpone mondays appointment.    Please let me know how you would like to respond.    Thank you,    Cortney Noe     Phone: [phone]

## 2023-04-14 NOTE — TELEPHONE ENCOUNTER
Please see below message. Pt was previously contacted on 3/28/23 by Gini Maxwell RN regarding this. Pt was previously approved for a one time dose on 3/31/23. The patient was called and also scheduled with me in [facility]. Appears that visit was cancelled with me on 3/29. Please see telephone encounter from 3/28/23 for additional information.     I can enter an order for UDS for patient to complete at earliest convenience.       EDITH Gallardo CNP on 4/14/2023 at 4:16 PM

## 2023-04-17 NOTE — TELEPHONE ENCOUNTER
Left  requesting call back to [phone] to schedule [facility]

## 2023-04-17 NOTE — TELEPHONE ENCOUNTER
Phone call to patient. Informed him that he will not be able to receive the Sublocade injection today due to the prior authorization needing to be renewed. Patient will call Infusion Center to reschedule Sublocade injection. Routing to [facility]  to call patient to schedule follow up appt with Kelly Hall in [facility].    Gini Maxwell RN on 4/17/2023 at 12:11 PM

## 2023-04-26 ENCOUNTER — TELEPHONE (OUTPATIENT)
Dept: BEHAVIORAL HEALTH | Facility: CLINIC | Age: 38
End: 2023-04-26

## 2023-04-26 ENCOUNTER — OFFICE VISIT (OUTPATIENT)
Dept: ADDICTION MEDICINE | Facility: CLINIC | Age: 38
End: 2023-04-26
Payer: COMMERCIAL

## 2023-04-26 VITALS — SYSTOLIC BLOOD PRESSURE: 118 MMHG | HEART RATE: 73 BPM | DIASTOLIC BLOOD PRESSURE: 75 MMHG

## 2023-04-26 DIAGNOSIS — F11.20 OPIOID USE DISORDER, SEVERE, DEPENDENCE (H): Primary | ICD-10-CM

## 2023-04-26 DIAGNOSIS — F41.9 ANXIETY: ICD-10-CM

## 2023-04-26 DIAGNOSIS — F15.20 SEVERE METHAMPHETAMINE USE DISORDER (H): ICD-10-CM

## 2023-04-26 DIAGNOSIS — Z72.0 TOBACCO USE: ICD-10-CM

## 2023-04-26 LAB
ALT SERPL W P-5'-P-CCNC: 33 U/L (ref 10–50)
AMPHETAMINE QUAL URINE POCT: NEGATIVE
AST SERPL W P-5'-P-CCNC: 24 U/L (ref 10–50)
BARBITURATE QUAL URINE POCT: NEGATIVE
BENZODIAZEPINE QUAL URINE POCT: NEGATIVE
BUPRENORPHINE QUAL URINE POCT: ABNORMAL
COCAINE QUAL URINE POCT: NEGATIVE
CREATININE QUAL URINE POCT: ABNORMAL
FENTANYL UR QL: NORMAL
INTERNAL QC QUAL URINE POCT: ABNORMAL
MDMA QUAL URINE POCT: NEGATIVE
METHADONE QUAL URINE POCT: NEGATIVE
METHAMPHETAMINE QUAL URINE POCT: NEGATIVE
OPIATE QUAL URINE POCT: NEGATIVE
OXYCODONE QUAL URINE POCT: NEGATIVE
PH QUAL URINE POCT: ABNORMAL
PHENCYCLIDINE QUAL URINE POCT: NEGATIVE
POCT KIT EXPIRATION DATE: ABNORMAL
POCT KIT LOT NUMBER: ABNORMAL
SPECIFIC GRAVITY POCT: 1.02
TEMPERATURE URINE POCT: ABNORMAL
THC QUAL URINE POCT: NEGATIVE

## 2023-04-26 PROCEDURE — 99214 OFFICE O/P EST MOD 30 MIN: CPT | Performed by: NURSE PRACTITIONER

## 2023-04-26 PROCEDURE — 84460 ALANINE AMINO (ALT) (SGPT): CPT | Performed by: NURSE PRACTITIONER

## 2023-04-26 PROCEDURE — 80307 DRUG TEST PRSMV CHEM ANLYZR: CPT | Performed by: NURSE PRACTITIONER

## 2023-04-26 PROCEDURE — 84450 TRANSFERASE (AST) (SGOT): CPT | Performed by: NURSE PRACTITIONER

## 2023-04-26 PROCEDURE — 36415 COLL VENOUS BLD VENIPUNCTURE: CPT | Performed by: NURSE PRACTITIONER

## 2023-04-26 RX ORDER — HYDROXYZINE PAMOATE 25 MG/1
25-50 CAPSULE ORAL 3 TIMES DAILY PRN
Qty: 180 CAPSULE | Refills: 0 | Status: SHIPPED | OUTPATIENT
Start: 2023-04-26

## 2023-04-26 RX ORDER — BUPRENORPHINE AND NALOXONE 8; 2 MG/1; MG/1
1 FILM, SOLUBLE BUCCAL; SUBLINGUAL DAILY
Qty: 15 FILM | Refills: 0 | Status: SHIPPED | OUTPATIENT
Start: 2023-04-26

## 2023-04-26 RX ORDER — NICOTINE 21 MG/24HR
1 PATCH, TRANSDERMAL 24 HOURS TRANSDERMAL EVERY 24 HOURS
Qty: 30 PATCH | Refills: 1 | Status: SHIPPED | OUTPATIENT
Start: 2023-04-26

## 2023-04-26 NOTE — PROGRESS NOTES
[facility]    A/P                                                    ASSESSMENT/PLAN  1. Opioid use disorder, severe, dependence (H)  Controlled. Sustained remission. Was not able to receive #3 100 mg injection at beginning of April due to insurance change. Request PA approval to continue Sublocade, routed as high priority. Plan to continue Suboxone 8 mg FL film once daily until next injection. Then discontinue films 24 hr after next injection. Monitor for cravings.   - Continue programming at [facility] [place].   Strong ongoing recovery supports.   - UDS today   - AST and ALT for medication monitoring   - Drugs of Abuse Screen Urine (POC CUPS) POCT; Standing  - Fentanyl, Qualitative, with Reflex to Quant Urine; Future  - buprenorphine HCl-naloxone HCl (SUBOXONE) 8-2 MG per film; Place 1 Film under the tongue daily  Dispense: 15 Film; Refill: 0  - AST; Future  - ALT; Future    2. Severe methamphetamine use disorder (H)  Controlled. No recent use or cravings. Continue programming at [facility] in [place].   Strong ongoing recovery supports.   - Drugs of Abuse Screen Urine (POC CUPS) POCT; Standing    3. Tobacco use  Encouraged efforts. Continue nicotine patch daily and lozenges prn   - nicotine (NICORETTE) 4 MG lozenge; Place 1 lozenge (4 mg) inside cheek every hour as needed for smoking cessation  Dispense: 108 lozenge; Refill: 1  - nicotine (NICODERM CQ) 14 MG/24HR 24 hr patch; Place 1 patch onto the skin every 24 hours  Dispense: 30 patch; Refill: 1    4. Anxiety  - Requesting refill. Managed per PCP. Refilled for month supply as below.   - hydrOXYzine (VISTARIL) 25 MG capsule; Take 1-2 capsules (25-50 mg) by mouth 3 times daily as needed for anxiety  Dispense: 180 capsule; Refill: 0    RTC  Return in about 4 weeks (around 5/24/2023) for Follow up, with me, in person.    Counseled the patient on the importance of having a recovery program in addition to medication to manage  recovery.  Encouraged other services such as counseling, 12 step or other self-help organizations. Strongly recommended abstain from alcohol, benzodiazepines, THC, opioids and other drugs of abuse.  Increased risk of return to opioid use after use of these substances discussed.  Increased risk of overdose/death with use of other substances particularly benzodiazepines/alcohol reviewed.    Last encounter A/P 2/28/23  1. Opioid use disorder, severe, dependence (H)  Controlled. Continue monthly Sublocade injections. Next injection, #2 300 mg on 3/2/23. Previously stable of 24 mg of SL buprenorphine per day. OK to take 4-8 mg of SL buprenorphine as needed for cravings. Updated below rx. No refill needed today, has films at home.   - Continue engagement with programming at [facility].   - recheck AST and ALT ~ 03/2023   - confirms access to narcan   - buprenorphine HCl-naloxone HCl (SUBOXONE) 8-2 MG per film; Place 0.5-1 Film under the tongue daily as needed (opioid cravings or withdrawal symptoms)  Dispense: 30 Film; Refill: 0     2. Methamphetamine use disorder, severe (H)  - Controlled. No recent use or cravings. Continue engagement with programming at [facility].      3. Tobacco use  - Encouraged efforts with smoking cessation. Start NRT as below.   - nicotine (NICODERM CQ) 21 MG/24HR 24 hr patch; Place 1 patch onto the skin every 24 hours  Dispense: 30 patch; Refill: 1  - nicotine (NICORETTE) 4 MG lozenge; Place 1 lozenge (4 mg) inside cheek every hour as needed for smoking cessation  Dispense: 108 lozenge; Refill: 1         PDMP Review       Value Time User    State PDMP site checked  Yes 4/26/2023  2:13 PM Kelly Hall APRN CNP        01/19/2023  1   01/12/2023  Suboxone 8 Mg-2 MG SL Film  81.00  27         SUBJECTIVE                                                      CC/HPI:  Ted Borrero is a 38 year old male with PMH of hypercholesteremia, depression, schizoaffective disorder, PTSD, methamphetamine  "use disorder and opioid use disorder on buprenorphine who presents for follow up.      Brief History:  [facility]. They were referred by Kelly Oleary.   Patient's reasons for seeking treatment on this date include \"I want to switch from taking Suboxone to the Sublocade injection.\" Reports opioid use starting at 17 yo, occasionally, pills or heroin. Reports daily use starting within the past 1.5 yrs, perc 30 pills. Reports he was using methamphetamine daily. Denies benzodiazapine use history.  Reports history of binge drinking about 5 years ago. Reports he was started on Suboxone in early November at [facility] following an overdose. Has taken it daily since then. Taking 8 mg SL TID. Continued buprenorphine through  after initial visit. Transitioned to Sublocade. Initial injection on 1/25/23. Most recent injection on 3/2/23 (#2 300 mg). Lost insurance coverage for Sublocade. Started back on Suboxone 8 mg daily until next injection.      Substance Use History:  Opioids:   Age at first use: 18, started with occasional use of rx opioid pills, progressed to heroin use.  ~2020 began using unregulated opioid pills/fentanyl   Current use: substance: Fentanyl; quantity daily; route: smoke;timing of last use: 10/30/22;                 IV drug use: Yes: once on 10/30/22   History of overdose: Yes: twice last time on 10/30/22  Previous residential or outpatient treatments for addiction : Yes: 4 times  Previous medication treatments for addiction: Yes: Suboxone  Longest period of sobriety: currently   Medical complications related to substance use: denies  Hepatitis C: 12/28/22 HCV ab nonreactive  HIV: 12/28/22 HIV ag/ab nonreactive     Other Addiction History:  Stimulants   Meth-daily use until 10/30/22; Cocaine- occasional   Sedatives/hypnotics/anxiolytics:   denies  Alcohol:   denies  Nicotine:   0.25 pack/day  Cannabis:   Occasional   Hallucinogens/Dissociatives: "   occasional  Eating disorder:  denies  Gambling:              denies           PAST PSYCHIATRIC HISTORY:  Diagnoses- PTSD and schizoaffective   Suicide Attempts: Yes 2.5 years ago, one time   Hospitalizations: No   Mental health provider: [facility]; transferring to a different provider within [facility] 2/2023     Social History  Housing status: in a sober house, [facility]  Employment status: Unemployed, not seeking work  Relationship status: Partnered  Children: 1 child 5 YO son, lives with mom/pt's SO of 12 years  Legal: probation        Recent HPI Details:  02/28/23 visit:  - had initial injection and has intermittently taken 8 mg of SL buprenorphine. Took 8 mg for the first couple of days after inject. Then symptoms resolved. Was schedule for injection a couple days ago but needed to reschedule due to weather. Next injection now on 3/2/23. Taking 8 mg of SL buprenorphine or opioid cravings as needed. Was previously on 24 mg of SL buprenorphine per day. Initial injection went well, no injection site reactions or adverse SE.   - Methamphetamine cravings well controlled.   - has continued engagement with [facility] inpatient, he will be there until April 4th 2023. Working on discharge plan. Possible sober housing with outpatient or back home.   - Currently smoking cigarettes and vaping about 5 cigarettes per day. Motivated to quit. Trying to use ENDS to reduce cigarettes. Has used lozenges and patch in the patch which was effective.     TODAY'S VISIT  HPI Apr 26, 2023  - has been intermittently taking about 8 mg of buprenorphine 2-3 days per week since his last injection. He was due for next injection on 4/2/23. Denies return to full opioid agonist use. No cravings. No withdrawal symptoms.   - now in [facility] in [facility] then moving home [facility], will be renting house there. Will be moving there with SO and child who is 6 years old.   - No other substance use. No methamphetamine use or  cravings.   - smoking cigarettes about 5 per day, working to reduce use.   - without other concerns today       OBJECTIVE  PHYSICAL EXAM:  /75   Pulse 73     GENERAL: healthy, alert and no distress  EYES: Eyes grossly normal to inspection, PERRL and conjunctivae and sclerae normal  RESP: No respiratory distress  MENTAL STATUS EXAM  Appearance/Behavior: No appearant distress and Neatly groomed  Speech: Normal  Mood/Affect: normal affect  Insight: Adequate      PHQ-9 Score:       12/28/2022     1:00 PM 1/12/2023    12:00 PM 2/28/2023     9:00 AM   PHQ   PHQ-9 Total Score 11 10 7   Q9: Thoughts of better off dead/self-harm past 2 weeks Not at all Not at all Not at all       GIGI-7 Score:       View : No data to display.                LABS (may not contain today's labs)                                                      Today's lab data  Results for orders placed or performed in visit on 04/26/23   Drugs of Abuse Screen Urine (POC CUPS) POCT     Status: Abnormal   Result Value Ref Range    POCT Kit Lot Number [id number]     POCT Kit Expiration Date 1800534     Temperature Urine POCT 92 F 90 F, 92 F, 94 F, 96 F, 98 F, 100 F    Specific [place] POCT 1.025 1.005, 1.015, 1.025    pH Qual Urine POCT 7 pH 4 pH, 5 pH, 7 pH, 9 pH    Creatinine Qual Urine POCT 100 mg/dL 20 mg/dL, 50 mg/dL, 100 mg/dL, 200 mg/dL    Internal QC Qual Urine POCT Valid Valid    Amphetamine Qual Urine POCT Negative Negative    Barbiturate Qual Urine POCT Negative Negative    Buprenorphine Qual Urine POCT Screen Positive (A) Negative    Benzodiazepine Qual Urine POCT Negative Negative    Cocaine Qual Urine POCT Negative Negative    Methamphetamine Qual Urine POCT Negative Negative    MDMA Qual Urine POCT Negative Negative    Methadone Qual Urine POCT Negative Negative    Opiate Qual Urine POCT Negative Negative    Oxycodone Qual Urine POCT Negative Negative    Phencyclidine Qual Urine POCT Negative Negative    THC Qual Urine POCT Negative  Negative       HISTORY                                                    Problem list reviewed & adjusted, as indicated.  Patient Active Problem List   Diagnosis     Colitis     Left-sided weakness     Elevated troponin     Morbid obesity (H)     Opioid use disorder, severe, dependence (H)     Severe methamphetamine use disorder (H)     Schizoaffective disorder, bipolar type (H)         MEDICATION LIST (prior to visit)  cloNIDine (CATAPRES) 0.1 MG tablet, Take 1 tablet (0.1 mg) by mouth At Bedtime  lithium (ESKALITH CR/LITHOBID) 450 MG CR tablet, Take 1 tablet (450 mg) by mouth At Bedtime  Multiple Vitamin (QUINTABS) TABS, Take 1 tablet by mouth daily  polyethylene glycol (MIRALAX) 17 GM/Dose powder, Take 1 capful. by mouth daily as needed for constipation  risperiDONE (RISPERDAL) 2 MG tablet, Take 1 tablet (2 mg) by mouth 2 times daily  senna (SENOKOT) 8.6 MG tablet, Take 1-2 tablets (8.6-17.2 mg) by mouth daily as needed for constipation  vitamin D3 (CHOLECALCIFEROL) 50 mcg (2000 units) tablet, Take 1 tablet (50 mcg) by mouth daily  risperiDONE (RISPERDAL) 1 MG tablet, Take 1 tablet (1 mg) by mouth 2 times daily (Patient not taking: Reported on 4/26/2023)    No current facility-administered medications on file prior to visit.      MEDICATION LIST (after visit)  Current Outpatient Medications   Medication     buprenorphine HCl-naloxone HCl (SUBOXONE) 8-2 MG per film     cloNIDine (CATAPRES) 0.1 MG tablet     hydrOXYzine (VISTARIL) 25 MG capsule     lithium (ESKALITH CR/LITHOBID) 450 MG CR tablet     Multiple Vitamin (QUINTABS) TABS     nicotine (NICODERM CQ) 14 MG/24HR 24 hr patch     nicotine (NICORETTE) 4 MG lozenge     polyethylene glycol (MIRALAX) 17 GM/Dose powder     risperiDONE (RISPERDAL) 2 MG tablet     senna (SENOKOT) 8.6 MG tablet     vitamin D3 (CHOLECALCIFEROL) 50 mcg (2000 units) tablet     risperiDONE (RISPERDAL) 1 MG tablet     No current facility-administered medications for this visit.         No  Known Allergies      EDITH Gallardo [facility]  [phone]

## 2023-04-26 NOTE — TELEPHONE ENCOUNTER
Requesting PA approval for Sublocade. This has been previously approved, however pt now has new insurance.      - I am certified to treat addictions under DATA 2000 waiver, XDEA # [id number]  - I have reviewed recommendations for comprehensive treatment plan with the patient  - I have reviewed the patient's medications comprehensively  and provided education to the patient on risks associated with concurrent use of benzodiazepines, alcohol, other sedatives with opioids  - I have recommended concomitant psychosocial support  - I have complied with all aspects of REMS program for Sublocade. [facility] where Sublocade will be administered is in compliance with all aspects of REMS program.   - Patient meets DSM-5 criteria for moderate or severe opioid use disorder  - Patient has been prescribed buprenorphine 8-24mg/day for >1 week  - Patient will discontinue sublingual buprenorphine when steady state achieved after starting Sublocade  - Patient does not have severe hepatic impairment.   - Patient does not have a history of long QT syndrome  - Urine Drug Screen on 4/26/23 was positive for buprenorphine  - Patient will not be receiving methadone while on Sublocade  - Patient will not be receiving any other long acting products for the treatment of opioid use disorder while on Sublocade          EDITH Gallardo CNP on 4/26/2023 at 2:53 PM

## 2023-04-26 NOTE — NURSING NOTE
[facility] - [facility]       Rooming information:    Point of care urine drug screen positive for:  Lab Results   Component Value Date    BUP Screen Positive (A) 04/26/2023    BZO Negative 04/26/2023    BAR Negative 04/26/2023    JONATHON Negative 04/26/2023    MAMP Negative 04/26/2023    AMP Negative 04/26/2023    MDMA Negative 04/26/2023    MTD Negative 04/26/2023    AGK031 Negative 04/26/2023    OXY Negative 04/26/2023    PCP Negative 04/26/2023    THC Negative 04/26/2023    TEMP 92 F 04/26/2023    SGPOCT 1.025 04/26/2023       *POC urine drug screen does not screen for Fentanyl    PHQ-9 Scores:       12/28/2022     1:00 PM 1/12/2023    12:00 PM 2/28/2023     9:00 AM   PHQ   PHQ-9 Total Score 11 10 7   Q9: Thoughts of better off dead/self-harm past 2 weeks Not at all Not at all Not at all     GIGI-7 Scores:       View : No data to display.                Any other recent substance use:     Denies    NICOTINE-Yes:   If using nicotine, ready to quit? Yes:     Side effects related to medications pt would like to discuss with provider (constipation, dry mouth, HA, GI upset, sedation?) No     Narcan currently available: No    Primary care provider: Physician No Ref-Primary     Mental health provider: denies  (follow up on MH referral if needed)    Any housing, insurance deficits?: stable     Contact information up to date? yes    3rd Party Involvement not at this time (please obtain BIRD if pt would like to include)    Nara Tanner MA  April 26, 2023  1:59 PM

## 2023-04-27 NOTE — TELEPHONE ENCOUNTER
Called patient to inform that Sublocade PA was approved. OK to schedule next injection, #3 100 mg. Discontinue films 24 hr after next injection    EDITH Gallardo CNP on 4/27/2023 at 9:12 AM

## 2023-05-08 ENCOUNTER — INFUSION THERAPY VISIT (OUTPATIENT)
Dept: INFUSION THERAPY | Facility: CLINIC | Age: 38
End: 2023-05-08
Attending: NURSE PRACTITIONER
Payer: COMMERCIAL

## 2023-05-08 VITALS — DIASTOLIC BLOOD PRESSURE: 72 MMHG | HEART RATE: 68 BPM | SYSTOLIC BLOOD PRESSURE: 111 MMHG | TEMPERATURE: 97.7 F

## 2023-05-08 DIAGNOSIS — F11.20 OPIOID USE DISORDER, SEVERE, DEPENDENCE (H): Primary | ICD-10-CM

## 2023-05-08 PROCEDURE — 250N000009 HC RX 250: Performed by: NURSE PRACTITIONER

## 2023-05-08 PROCEDURE — 250N000011 HC RX IP 250 OP 636: Performed by: NURSE PRACTITIONER

## 2023-05-08 PROCEDURE — 96372 THER/PROPH/DIAG INJ SC/IM: CPT | Performed by: NURSE PRACTITIONER

## 2023-05-08 RX ORDER — LIDOCAINE HYDROCHLORIDE 10 MG/ML
2 INJECTION, SOLUTION EPIDURAL; INFILTRATION; INTRACAUDAL; PERINEURAL ONCE
Status: COMPLETED | OUTPATIENT
Start: 2023-05-08 | End: 2023-05-08

## 2023-05-08 RX ORDER — METHYLPREDNISOLONE SODIUM SUCCINATE 125 MG/2ML
125 INJECTION, POWDER, LYOPHILIZED, FOR SOLUTION INTRAMUSCULAR; INTRAVENOUS
Status: CANCELLED
Start: 2023-05-16

## 2023-05-08 RX ORDER — ALBUTEROL SULFATE 90 UG/1
1-2 AEROSOL, METERED RESPIRATORY (INHALATION)
Status: CANCELLED
Start: 2023-05-16

## 2023-05-08 RX ORDER — DIPHENHYDRAMINE HYDROCHLORIDE 50 MG/ML
50 INJECTION INTRAMUSCULAR; INTRAVENOUS
Status: CANCELLED
Start: 2023-05-16

## 2023-05-08 RX ORDER — MEPERIDINE HYDROCHLORIDE 25 MG/ML
25 INJECTION INTRAMUSCULAR; INTRAVENOUS; SUBCUTANEOUS EVERY 30 MIN PRN
Status: CANCELLED | OUTPATIENT
Start: 2023-05-16

## 2023-05-08 RX ORDER — ALBUTEROL SULFATE 0.83 MG/ML
2.5 SOLUTION RESPIRATORY (INHALATION)
Status: CANCELLED | OUTPATIENT
Start: 2023-05-16

## 2023-05-08 RX ORDER — LIDOCAINE HYDROCHLORIDE 10 MG/ML
2 INJECTION, SOLUTION EPIDURAL; INFILTRATION; INTRACAUDAL; PERINEURAL ONCE
Status: CANCELLED | OUTPATIENT
Start: 2023-05-16 | End: 2023-05-16

## 2023-05-08 RX ORDER — EPINEPHRINE 1 MG/ML
0.3 INJECTION, SOLUTION, CONCENTRATE INTRAVENOUS EVERY 5 MIN PRN
Status: CANCELLED | OUTPATIENT
Start: 2023-05-16

## 2023-05-08 RX ADMIN — LIDOCAINE HYDROCHLORIDE 2 ML: 10 INJECTION, SOLUTION EPIDURAL; INFILTRATION; INTRACAUDAL; PERINEURAL at 11:34

## 2023-05-08 RX ADMIN — BUPRENORPHINE 100 MG: 100 SOLUTION SUBCUTANEOUS at 11:41

## 2023-05-08 ASSESSMENT — PAIN SCALES - GENERAL: PAINLEVEL: NO PAIN (0)

## 2023-05-08 NOTE — PROGRESS NOTES
Infusion Nursing Note:  Ted GENAO Gissell presents today for sublocade 100 mg injection.    Patient seen by provider today: No   present during visit today: Not Applicable.    Note: Did not receive injection in April due to insurance issues.      Intravenous Access:  No Intravenous access/labs at this visit.    Treatment Conditions:  Denies relapse.  No S/S infection or tampering at previous injection sites.      Post Infusion Assessment:  Patient tolerated injection without incident.  Given in right mid abd after 2ml subcutaneous xylocaine administered for local anesthesia.       Discharge Plan:   Discharge instructions reviewed with patient: reminded him of his next visit with Rafael.  Patient discharged in stable condition accompanied by: self.  Departure Mode: Ambulatory.  RTC 6/5/23.      Julee Carrillo

## 2023-06-03 ENCOUNTER — HEALTH MAINTENANCE LETTER (OUTPATIENT)
Age: 38
End: 2023-06-03

## 2023-06-19 ENCOUNTER — INFUSION THERAPY VISIT (OUTPATIENT)
Dept: INFUSION THERAPY | Facility: CLINIC | Age: 38
End: 2023-06-19
Attending: NURSE PRACTITIONER
Payer: COMMERCIAL

## 2023-06-19 VITALS
HEART RATE: 104 BPM | DIASTOLIC BLOOD PRESSURE: 85 MMHG | SYSTOLIC BLOOD PRESSURE: 131 MMHG | OXYGEN SATURATION: 95 % | RESPIRATION RATE: 18 BRPM | TEMPERATURE: 96.8 F

## 2023-06-19 DIAGNOSIS — F11.20 OPIOID USE DISORDER, SEVERE, DEPENDENCE (H): Primary | ICD-10-CM

## 2023-06-19 PROCEDURE — 96372 THER/PROPH/DIAG INJ SC/IM: CPT | Performed by: NURSE PRACTITIONER

## 2023-06-19 PROCEDURE — 250N000011 HC RX IP 250 OP 636: Performed by: NURSE PRACTITIONER

## 2023-06-19 PROCEDURE — 250N000009 HC RX 250: Performed by: NURSE PRACTITIONER

## 2023-06-19 RX ORDER — LIDOCAINE HYDROCHLORIDE 10 MG/ML
2 INJECTION, SOLUTION EPIDURAL; INFILTRATION; INTRACAUDAL; PERINEURAL ONCE
Status: CANCELLED | OUTPATIENT
Start: 2023-07-03 | End: 2023-07-03

## 2023-06-19 RX ORDER — MEPERIDINE HYDROCHLORIDE 25 MG/ML
25 INJECTION INTRAMUSCULAR; INTRAVENOUS; SUBCUTANEOUS EVERY 30 MIN PRN
Status: CANCELLED | OUTPATIENT
Start: 2023-07-03

## 2023-06-19 RX ORDER — LIDOCAINE HYDROCHLORIDE 10 MG/ML
2 INJECTION, SOLUTION EPIDURAL; INFILTRATION; INTRACAUDAL; PERINEURAL ONCE
Status: COMPLETED | OUTPATIENT
Start: 2023-06-19 | End: 2023-06-19

## 2023-06-19 RX ORDER — ALBUTEROL SULFATE 0.83 MG/ML
2.5 SOLUTION RESPIRATORY (INHALATION)
Status: CANCELLED | OUTPATIENT
Start: 2023-07-03

## 2023-06-19 RX ORDER — EPINEPHRINE 1 MG/ML
0.3 INJECTION, SOLUTION, CONCENTRATE INTRAVENOUS EVERY 5 MIN PRN
Status: CANCELLED | OUTPATIENT
Start: 2023-07-03

## 2023-06-19 RX ORDER — ALBUTEROL SULFATE 90 UG/1
1-2 AEROSOL, METERED RESPIRATORY (INHALATION)
Status: CANCELLED
Start: 2023-07-03

## 2023-06-19 RX ORDER — METHYLPREDNISOLONE SODIUM SUCCINATE 125 MG/2ML
125 INJECTION, POWDER, LYOPHILIZED, FOR SOLUTION INTRAMUSCULAR; INTRAVENOUS
Status: CANCELLED
Start: 2023-07-03

## 2023-06-19 RX ORDER — DIPHENHYDRAMINE HYDROCHLORIDE 50 MG/ML
50 INJECTION INTRAMUSCULAR; INTRAVENOUS
Status: CANCELLED
Start: 2023-07-03

## 2023-06-19 RX ADMIN — BUPRENORPHINE 100 MG: 100 SOLUTION SUBCUTANEOUS at 15:35

## 2023-06-19 RX ADMIN — LIDOCAINE HYDROCHLORIDE 2 ML: 10 INJECTION, SOLUTION EPIDURAL; INFILTRATION; INTRACAUDAL; PERINEURAL at 15:31

## 2023-06-19 ASSESSMENT — PAIN SCALES - GENERAL: PAINLEVEL: NO PAIN (0)

## 2023-06-19 NOTE — PROGRESS NOTES
Infusion Nursing Note:  Ted Borrero presents today for sublocade 100mg.    Patient seen by provider today: No   present during visit today: Not Applicable.    Note: Patient states he is doing well.  Denies relapse, states had some cravings and did take some suboxone for this.      Intravenous Access:  No Intravenous access/labs at this visit.    Treatment Conditions:  Not Applicable.      Post Infusion Assessment:  Patient tolerated injection without incident.  Lidocaine given prior to sublocade .  Sublocade injection given in the same trajectory without difficulty.      Discharge Plan:   Patient discharged in stable condition accompanied by: self.  Departure Mode: Ambulatory.  Will return to clinic in 4 weeks, appointment scheduled.    Estelle Castle RN

## 2024-07-06 ENCOUNTER — HEALTH MAINTENANCE LETTER (OUTPATIENT)
Age: 39
End: 2024-07-06

## 2025-07-13 ENCOUNTER — HEALTH MAINTENANCE LETTER (OUTPATIENT)
Age: 40
End: 2025-07-13

## (undated) RX ORDER — LIDOCAINE HYDROCHLORIDE 10 MG/ML
INJECTION, SOLUTION EPIDURAL; INFILTRATION; INTRACAUDAL; PERINEURAL
Status: DISPENSED
Start: 2023-06-19

## (undated) RX ORDER — LIDOCAINE HYDROCHLORIDE 10 MG/ML
INJECTION, SOLUTION EPIDURAL; INFILTRATION; INTRACAUDAL; PERINEURAL
Status: DISPENSED
Start: 2023-03-02

## (undated) RX ORDER — LIDOCAINE HYDROCHLORIDE 10 MG/ML
INJECTION, SOLUTION EPIDURAL; INFILTRATION; INTRACAUDAL; PERINEURAL
Status: DISPENSED
Start: 2023-05-08